# Patient Record
Sex: FEMALE | Race: WHITE | Employment: OTHER | URBAN - METROPOLITAN AREA
[De-identification: names, ages, dates, MRNs, and addresses within clinical notes are randomized per-mention and may not be internally consistent; named-entity substitution may affect disease eponyms.]

---

## 2017-01-26 RX ORDER — ATORVASTATIN CALCIUM 40 MG/1
TABLET, FILM COATED ORAL
Qty: 30 TAB | Refills: 6 | Status: SHIPPED | OUTPATIENT
Start: 2017-01-26 | End: 2017-03-13 | Stop reason: SDUPTHER

## 2017-03-13 ENCOUNTER — OFFICE VISIT (OUTPATIENT)
Dept: CARDIOLOGY CLINIC | Age: 62
End: 2017-03-13

## 2017-03-13 VITALS
DIASTOLIC BLOOD PRESSURE: 60 MMHG | BODY MASS INDEX: 18.88 KG/M2 | WEIGHT: 100 LBS | SYSTOLIC BLOOD PRESSURE: 115 MMHG | OXYGEN SATURATION: 98 % | HEIGHT: 61 IN

## 2017-03-13 DIAGNOSIS — I25.10 CORONARY ARTERY DISEASE INVOLVING NATIVE CORONARY ARTERY OF NATIVE HEART WITHOUT ANGINA PECTORIS: Primary | ICD-10-CM

## 2017-03-13 DIAGNOSIS — Z95.5 HISTORY OF CORONARY ARTERY STENT PLACEMENT: ICD-10-CM

## 2017-03-13 DIAGNOSIS — E78.5 HYPERLIPIDEMIA LDL GOAL <100: ICD-10-CM

## 2017-03-13 RX ORDER — ATORVASTATIN CALCIUM 40 MG/1
TABLET, FILM COATED ORAL
Qty: 90 TAB | Refills: 3 | Status: SHIPPED | OUTPATIENT
Start: 2017-03-13 | End: 2017-09-25 | Stop reason: SDUPTHER

## 2017-03-13 RX ORDER — PRASUGREL 10 MG/1
10 TABLET, FILM COATED ORAL DAILY
Qty: 30 TAB | Refills: 6 | Status: SHIPPED | OUTPATIENT
Start: 2017-03-13 | End: 2017-05-02 | Stop reason: ALTCHOICE

## 2017-03-13 NOTE — PROGRESS NOTES
HPI: I saw Irene SamuelBenny Castrejon in my office today in cardiovascular evaluation regarding her chronic coronary artery disease. Ms. Edison Castrejon is a very pleasant 59year old small  female, who began having some exertional chest tightness on 6/4/16, which progressed to more discomfort with less exertion and prompting a hospitalization on 6/6/16. She had only a very minimal increase in her biomarkers, but her history prompted a pharmacologic myocardial perfusion study, which demonstrated some inferior wall signs of ischemia and ultimately a cardiac catheterization by my associate Dr. Geri Wren and findings with the following anatomy:     1. Patent left main trunk. 2. 30% mid LAD obstruction. 3. Very small angiographically normal ramus intermediate range. 4. Diagonal branches were angiographically normal.  5. 20% smooth mid circumflex obstruction with patent obtuse marginal branches. 6. 99% stenosis at the junction of the mid and distal third of the vessel of TIMI2 flow.     The patient subsequently had stenting with 3.0 x 18 mm and 3.0 x 8 mm Xience stents with improvement of TIMI2 flow to TIMI3 flow and 0 residual.     She has done well on medical therapy since that time and comes in today relating that she is remaining quite active and denies any cardiovascular complaints whatsoever at this time. Encounter Diagnoses   Name Primary?  Coronary artery disease involving native coronary artery of native heart without angina pectoris Yes    History of coronary artery stent placement     Hyperlipidemia LDL goal <100        Discussion: This lady appears to be doing about as well as we could expect that I really have no recommendations for change at this time. She is not have any symptoms to suggest the development of recurrent symptomatic obstructive coronary disease and denies any heart failure issues.     Her latest lipid profile which was completed on November 22, 2016 demonstrated total cholesterol of 148, triglycerides 71, HDL of 68, LDL of 67.8, and VLDL of 14.2 which I think is good control on Lipitor 40 mg daily. Her blood pressure is well-controlled today and her EKG is stable and since she is otherwise doing well I am simply going to continue her present medications and see her again in several months or as needed if any new cardiovascular symptoms surface in the interim. PCP: Yong Phoenix MD      Past Medical History:   Diagnosis Date    Acute coronary syndrome ([facility] 16    stent, right coronary artery, 99% occlusion    Allergic rhinitis     Asthma     CAD (coronary artery disease)     Cancer ([facility] [address] [facility]     cervical    Carpal tunnel syndrome     Dyslipidemia     mild    Headache(784.0)     migraine    Hearing loss in right ear     sudden right hearing loss associated with vertigo    Impingement syndrome of shoulder, bursitis, left shoulder 2010       Past Surgical History:   Procedure Laterality Date    ENDOSCOPY, COLON, DIAGNOSTIC      HX  SECTION      X2    HX CORONARY STENT PLACEMENT  16    3.0 x 18 mm Xience stent and 3.0 x 8 mm Xience stent    HX HEART CATHETERIZATION  16    99% RCA    HX HYSTERECTOMY      for cervical cancer; pelvic exams Marjan Barraza    HX ORTHOPAEDIC      left knee surgery arthroscopy    HX ORTHOPAEDIC      carpal tunnel left     HX PELVIC LAPAROSCOPY      X2    HX TONSILLECTOMY         Current Outpatient Rx   Name  Route  Sig  Dispense  Refill    atorvastatin (LIPITOR) 40 mg tablet    Oral    Take 1 Tab by mouth nightly. 30 Tab    6      metoprolol tartrate (LOPRESSOR) 25 mg tablet    Oral    Take 0.5 Tabs by mouth every twelve (12) hours. 30 Tab    6      ticagrelor (BRILINTA) 90 mg tablet    Oral    Take 1 Tab by mouth two (2) times a day. 60 Tab    11      aspirin 81 mg chewable tablet    Oral    Take 1 Tab by mouth daily.     30 Tab    1      nitroglycerin (NITROSTAT) 0.4 mg SL tablet    SubLINGual 1 Tab by SubLINGual route as needed for Chest Pain. 1 Bottle    1      multivitamin (ONE A DAY) tablet    Oral    Take 1 Tab by mouth daily.  SUMAtriptan (IMITREX) 100 mg tablet    Oral    Take 1 Tab by mouth once as needed for Migraine for up to 1 dose. 9 Tab    2      CALCIUM CARBONATE (CALTRATE 600 PO)    Oral    Take 1 Tab by mouth two (2) times a day. No Known Allergies    Social History:   Social History   Substance Use Topics    Smoking status: Never Smoker    Smokeless tobacco: Never Used    Alcohol use Yes      Comment: occassionally         Family history: family history includes Cancer in her father and mother; Heart Disease in her father. Review of Systems:   Constitutional: Negative. Respiratory: Negative. Cardiovascular: Negative. Gastrointestinal: Negative. Musculoskeletal: Negative.          Physical Exam:   The patient is an alert, oriented, well developed, well nourished 59 y.o. female who was in no acute distress at the time of my examination. There were no vitals taken for this visit. BP Readings from Last 3 Encounters:   12/23/16 108/72   10/21/16 102/52   08/10/16 116/70      Wt Readings from Last 3 Encounters:   12/23/16 46.8 kg (103 lb 3.2 oz)   10/21/16 47.4 kg (104 lb 9.6 oz)   08/10/16 49 kg (108 lb)     HEENT: Conjuctiva white, mucosa moist, no pallor or cyanosis. NECK: Supple without masses, tenderness or thyromegaly. There was no jugular venous distention. Carotid are full bilaterally without bruits. CHEST: Symmetrical with good excursion. LUNGS: Clear to auscultation in all fields. HEART: Regular rate and rhythm. The apex is not displaced. There were no lifts, thrills or heaves. There is a normal S1 and S2 without appreciable murmurs, rubs, clicks, or gallops auscultated. ABDOMEN: Soft without masses, tenderness or organomegaly. EXTREMITIES: Full peripheral pulses without peripheral edema.   INTEGUMENT: Skin is warm and dry NEUROLOGICAL: The patient was oriented x3 with motor function grossly intact. Review of Data: See PMH and Cardiology and Imaging sections for cardiac testing  Lab Results   Component Value Date/Time    Cholesterol, total 148 11/22/2016 07:39 AM    HDL Cholesterol 66 11/22/2016 07:39 AM    LDL, calculated 67.8 11/22/2016 07:39 AM    Triglyceride 71 11/22/2016 07:39 AM    CHOL/HDL Ratio 2.2 11/22/2016 07:39 AM       Results for orders placed or performed in visit on 08/10/16   AMB POC EKG ROUTINE W/ 12 LEADS, INTER & REP     Status: None    Narrative    Sinus bradycardia rate 55. This tracing is within normal limits. Alex Dimas D.O., F.A.C.C. [facility]  [facility]  [address] [place] [address]. [address] [place] [address]  [phone]    PLEASE NOTE:  This document has been produced using voice recognition software. Unrecognized errors in transcription may be present.

## 2017-03-13 NOTE — MR AVS SNAPSHOT
Visit Information Date & Time Provider Department Dept. Phone Encounter #  
 3/13/2017  2:40 PM Tricia Polo, [facility] [address] [id number] Follow-up Instructions Return in about 6 months (around 9/13/2017), or if symptoms worsen or fail to improve. Your Appointments 10/16/2017  7:45 AM  
LAB with IOC NURSE VISIT Internist of [facility] ([address]) Appt Note: lab  
 [address], [address] [facility] [address] [place]  
  
   
 [address], [address]  
  
    
 10/23/2017  9:15 AM  
PHYSICAL with Smooth Anders MD  
Internist of [facility] [address] [place] [address]) Appt Note: rpe  
 [address] [facility], [address] [facility] [address] [place]  
  
   
 [address], [address] Upcoming Health Maintenance Date Due DTaP/Tdap/Td series (1 - Tdap) 5/6/2006 INFLUENZA AGE 9 TO ADULT 8/1/2016 COLONOSCOPY 11/6/2016 BREAST CANCER SCRN MAMMOGRAM 8/18/2017 PAP AKA CERVICAL CYTOLOGY 8/19/2018 Allergies as of 3/13/2017  Review Complete On: 3/13/2017 By: Tricia Polo, DO No Known Allergies Current Immunizations  Reviewed on 10/21/2016 Name Date Influenza Vaccine Whole 12/12/2003 TD Vaccine 5/5/2006 Zoster Vaccine, Live 10/16/2015  2:16 PM  
  
 Not reviewed this visit You Were Diagnosed With   
  
 Codes Comments Coronary artery disease involving native coronary artery of native heart without angina pectoris    -  Primary ICD-10-CM: I25.10 ICD-9-CM: 414.01 History of coronary artery stent placement     ICD-10-CM: Z95.5 ICD-9-CM: V45.82 Hyperlipidemia LDL goal <100     ICD-10-CM: E78.5 ICD-9-CM: 272.4 Vitals BP Height(growth percentile) Weight(growth percentile) SpO2 BMI OB Status  115/60 (BP 1 Location: Left arm, BP Patient Position: Sitting) 5' 1\" (1.549 m) 100 lb (45.4 kg) 98% 18.89 kg/m2 Hysterectomy Smoking Status Never Smoker BMI and BSA Data Body Mass Index Body Surface Area  
 18.89 kg/m 2 1.4 m 2 Preferred Pharmacy Pharmacy Name Phone [facility] [address], [address] [facility] [place] [phone] Your Updated Medication List  
  
   
This list is accurate as of: 3/13/17  3:49 PM.  Always use your most recent med list.  
  
  
  
  
 aspirin 81 mg chewable tablet Take 1 Tab by mouth daily. atorvastatin 40 mg tablet Commonly known as:  LIPITOR  
take 1 tablet by mouth NIGHTLY CALTRATE 600 PO Take 1 Tab by mouth two (2) times a day. chlorpheniramine-HYDROcodone 10-8 mg/5 mL suspension Commonly known as:  Alexy Medicine ER Take 5 mL by mouth every twelve (12) hours as needed for Cough. Max Daily Amount: 10 mL. metoprolol tartrate 25 mg tablet Commonly known as:  LOPRESSOR Take 0.5 Tabs by mouth every twelve (12) hours. multivitamin tablet Commonly known as:  ONE A DAY Take 1 Tab by mouth daily. nitroglycerin 0.4 mg SL tablet Commonly known as:  NITROSTAT  
1 Tab by SubLINGual route as needed for Chest Pain.  
  
 prasugrel 10 mg tablet Commonly known as:  EFFIENT Take 1 Tab by mouth daily. SUMAtriptan 100 mg tablet Commonly known as:  IMITREX Take 1 Tab by mouth once as needed for Migraine for up to 1 dose. Prescriptions Sent to Pharmacy Refills  
 atorvastatin (LIPITOR) 40 mg tablet 3 Sig: take 1 tablet by mouth NIGHTLY Class: Normal  
 [id number] [address] Ph #: [phone]  
 prasugrel (EFFIENT) 10 mg tablet 6 Sig: Take 1 Tab by mouth daily. Class: Normal  
 Pharmacy: [id number] [address] [facility], [address] [facility] [place] Ph #: [phone] Route: Oral  
  
We Performed the Following AMB POC EKG ROUTINE W/ 12 LEADS, INTER & REP [57024 CPT(R)] Follow-up Instructions Return in about 6 months (around 9/13/2017), or if symptoms worsen or fail to improve. Introducing [facility] & HEALTH SERVICES! Dear Jesus Hernandez: 
Thank you for requesting a [employer] account. Our records indicate that you already have an active [employer] account. You can access your account anytime at [email or web address]/[employer] Did you know that you can access your hospital and ER discharge instructions at any time in [employer]? You can also review all of your test results from your hospital stay or ER visit. Additional Information If you have questions, please visit the Frequently Asked Questions section of the [employer] website at [email or web address] Remember, [employer] is NOT to be used for urgent needs. For medical emergencies, dial 911. Now available from your iPhone and Android! Please provide this summary of care documentation to your next provider. Your primary care clinician is listed as Betzaida Zurita. Wil Reyes. If you have any questions after today's visit, please call [phone].

## 2017-05-01 NOTE — TELEPHONE ENCOUNTER
Pt called to state she is having increased headaches and dizziness for the last 2-3 weeks since starting the effient.      Verbal order and read back per Cherilyn Angelucci, DO  Switch to Plavix 75 mg daily     Pt aware

## 2017-05-02 RX ORDER — CLOPIDOGREL BISULFATE 75 MG/1
75 TABLET ORAL DAILY
Qty: 30 TAB | Refills: 6 | Status: SHIPPED | OUTPATIENT
Start: 2017-05-02 | End: 2017-09-25 | Stop reason: SDUPTHER

## 2017-07-24 RX ORDER — METOPROLOL TARTRATE 25 MG/1
12.5 TABLET, FILM COATED ORAL EVERY 12 HOURS
Qty: 30 TAB | Refills: 6 | Status: SHIPPED | OUTPATIENT
Start: 2017-07-24 | End: 2017-09-25 | Stop reason: SDUPTHER

## 2017-09-01 ENCOUNTER — OFFICE VISIT (OUTPATIENT)
Dept: INTERNAL MEDICINE CLINIC | Age: 62
End: 2017-09-01

## 2017-09-01 VITALS
TEMPERATURE: 97.9 F | BODY MASS INDEX: 18.61 KG/M2 | SYSTOLIC BLOOD PRESSURE: 124 MMHG | HEIGHT: 61 IN | HEART RATE: 49 BPM | DIASTOLIC BLOOD PRESSURE: 66 MMHG | RESPIRATION RATE: 12 BRPM | WEIGHT: 98.6 LBS | OXYGEN SATURATION: 97 %

## 2017-09-01 DIAGNOSIS — J20.9 BRONCHITIS WITH BRONCHOSPASM: Primary | ICD-10-CM

## 2017-09-01 RX ORDER — HYDROCODONE POLISTIREX AND CHLORPHENIRAMINE POLISTIREX 10; 8 MG/5ML; MG/5ML
1 SUSPENSION, EXTENDED RELEASE ORAL
Qty: 115 ML | Refills: 0 | Status: SHIPPED | OUTPATIENT
Start: 2017-09-01 | End: 2017-10-23 | Stop reason: ALTCHOICE

## 2017-09-01 RX ORDER — PREDNISONE 10 MG/1
TABLET ORAL
Qty: 47 TAB | Refills: 0 | Status: SHIPPED | OUTPATIENT
Start: 2017-09-01 | End: 2017-09-25 | Stop reason: ALTCHOICE

## 2017-09-01 NOTE — PROGRESS NOTES
Sebastien Beal 1955, is a 57 y.o. female, who is seen today for several day history of respiratory symptoms especially cough. She notes that 10 days ago she had a bad migraine that eventually cleared with sumatriptan and the same thing happened the next day in the next 2 days she spent a lot of time in bed. Around that third or fourth day she started with some nasal congestion which has since cleared after using DayQuil NyQuil and Robitussin-DM but she started with cough that has not cleared. She did sleep a little better last night with using some old Tussionex that she had on hand. She has not heard any wheezing does not have dyspnea and has had no fever with any of this illness. It is a minimally productive cough but she has not brought up any dark secretions and generally no secretions at all. No pleuritic pain. Past Medical History:   Diagnosis Date    Acute coronary syndrome ([facility] [address] 6/6/16    stent, right coronary artery, 99% occlusion    Allergic rhinitis     Asthma     CAD (coronary artery disease)     [facility]    cervical    Carpal tunnel syndrome     Dyslipidemia     mild    Headache     migraine    Hearing loss in right ear 2011    sudden right hearing loss associated with vertigo    Impingement syndrome of shoulder, bursitis, left shoulder 8/12/2010     Current Outpatient Prescriptions   Medication Sig Dispense Refill    metoprolol tartrate (LOPRESSOR) 25 mg tablet Take 0.5 Tabs by mouth every twelve (12) hours. 30 Tab 6    clopidogrel (PLAVIX) 75 mg tab Take 1 Tab by mouth daily. 30 Tab 6    atorvastatin (LIPITOR) 40 mg tablet take 1 tablet by mouth NIGHTLY 90 Tab 3    chlorpheniramine-HYDROcodone (TUSSIONEX PENNKINETIC ER) 10-8 mg/5 mL suspension Take 5 mL by mouth every twelve (12) hours as needed for Cough. Max Daily Amount: 10 mL. 115 mL 0    SUMAtriptan (IMITREX) 100 mg tablet Take 1 Tab by mouth once as needed for Migraine for up to 1 dose.  9 Tab 2    aspirin 81 mg chewable tablet Take 1 Tab by mouth daily. 30 Tab 1    nitroglycerin (NITROSTAT) 0.4 mg SL tablet 1 Tab by SubLINGual route as needed for Chest Pain. 1 Bottle 1    multivitamin (ONE A DAY) tablet Take 1 Tab by mouth daily.  CALCIUM CARBONATE (CALTRATE 600 PO) Take 1 Tab by mouth two (2) times a day. No Known Allergies    Visit Vitals    /66    Pulse (!) 49    Temp 97.9 °F (36.6 °C) (Oral)    Resp 12    Ht 5' 1\" (1.549 m)    Wt 98 lb 9.6 oz (44.7 kg)    SpO2 97%    BMI 18.63 kg/m2     Nasal passages are clear. Pharynx reveals no redness exudate or drainage. Neck reveals no adenopathy. Lungs are clear to percussion. Good breath sounds throughout but with forced expiration she does have wheezing. No crackles. No pleuritic rub. No chest wall tenderness. She is not using accessory muscles of respiration. She did cough with forced expiration. Assessment: Bronchitis with bronchospasm, she has had a similar process in the past but generally has not required prednisone. 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 Will also treat with Tussionex as needed at night to sleep. This visit lasted 25 minutes, greater than 50% of the time spent counseling on the pathophysiology of her problem and possible side effects of prednisone and then dissipated benefits and time course etc.    Follow-up as previously planned    Vitor Barba. Live Caruso MD FACP    Please note: This document has been produced using voice recognition software. Unrecognized errors in transcription may be present.

## 2017-09-25 ENCOUNTER — OFFICE VISIT (OUTPATIENT)
Dept: CARDIOLOGY CLINIC | Age: 62
End: 2017-09-25

## 2017-09-25 VITALS
HEART RATE: 64 BPM | BODY MASS INDEX: 18.63 KG/M2 | DIASTOLIC BLOOD PRESSURE: 70 MMHG | WEIGHT: 98.6 LBS | OXYGEN SATURATION: 99 % | SYSTOLIC BLOOD PRESSURE: 90 MMHG

## 2017-09-25 DIAGNOSIS — I25.10 CORONARY ARTERY DISEASE INVOLVING NATIVE CORONARY ARTERY OF NATIVE HEART WITHOUT ANGINA PECTORIS: Primary | ICD-10-CM

## 2017-09-25 DIAGNOSIS — E78.5 DYSLIPIDEMIA: ICD-10-CM

## 2017-09-25 RX ORDER — ATORVASTATIN CALCIUM 40 MG/1
TABLET, FILM COATED ORAL
Qty: 90 TAB | Refills: 3 | Status: SHIPPED | OUTPATIENT
Start: 2017-09-25 | End: 2018-10-28 | Stop reason: SDUPTHER

## 2017-09-25 RX ORDER — NITROGLYCERIN 0.4 MG/1
0.4 TABLET SUBLINGUAL AS NEEDED
Qty: 1 BOTTLE | Refills: 3 | Status: SHIPPED | OUTPATIENT
Start: 2017-09-25 | End: 2018-12-05 | Stop reason: SDUPTHER

## 2017-09-25 RX ORDER — METOPROLOL TARTRATE 25 MG/1
12.5 TABLET, FILM COATED ORAL EVERY 12 HOURS
Qty: 90 TAB | Refills: 3 | Status: SHIPPED | OUTPATIENT
Start: 2017-09-25 | End: 2018-05-02 | Stop reason: SDUPTHER

## 2017-09-25 RX ORDER — CLOPIDOGREL BISULFATE 75 MG/1
75 TABLET ORAL DAILY
Qty: 30 TAB | Refills: 6 | Status: SHIPPED | OUTPATIENT
Start: 2017-09-25 | End: 2018-04-29 | Stop reason: SDUPTHER

## 2017-09-25 NOTE — MR AVS SNAPSHOT
Visit Information Date & Time Provider Department Dept. Phone Encounter #  
 9/25/2017  2:20 PM Oskar Benton, [address] [facility] [address] [id number] Your Appointments 10/16/2017  7:45 AM  
LAB with [facility] NURSE VISIT Internist of [facility] ([facility]) Appt Note: lab  
 [address], [address] [place] [address] [address] [place]  
  
   
 [address] [facility]  
  
    
 10/23/2017  9:15 AM  
PHYSICAL with Haresh Gallegos MD  
Internist of [facility]) Appt Note: rpe  
 [address] [facility], [address] [place]  
  
   
 [address] Upcoming Health Maintenance Date Due DTaP/Tdap/Td series (1 - Tdap) 5/6/2006 COLONOSCOPY 11/6/2016 INFLUENZA AGE 9 TO ADULT 8/1/2017 BREAST CANCER SCRN MAMMOGRAM 8/18/2017 PAP AKA CERVICAL CYTOLOGY 8/19/2018 Allergies as of 9/25/2017  Review Complete On: 9/25/2017 By: Oskar Benton, DO No Known Allergies Current Immunizations  Reviewed on 10/21/2016 Name Date Influenza Vaccine Whole 12/12/2003 TD Vaccine 5/5/2006 Zoster Vaccine, Live 10/16/2015  2:16 PM  
  
 Not reviewed this visit You Were Diagnosed With   
  
 Codes Comments Coronary artery disease involving native coronary artery of native heart without angina pectoris    -  Primary ICD-10-CM: I25.10 ICD-9-CM: 414.01 Dyslipidemia     ICD-10-CM: E78.5 ICD-9-CM: 272.4 Vitals BP Pulse Weight(growth percentile) SpO2 BMI OB Status 90/70 64 98 lb 9.6 oz (44.7 kg) 99% 18.63 kg/m2 Hysterectomy Smoking Status Never Smoker BMI and BSA Data Body Mass Index Body Surface Area  
 18.63 kg/m 2 1.39 m 2 Preferred Pharmacy Pharmacy Name Phone [facility] [address] [facility], [address] [facility] [phone] Your Updated Medication List  
  
   
This list is accurate as of: 9/25/17  3:13 PM.  Always use your most recent med list.  
  
  
  
  
 aspirin 81 mg chewable tablet Take 1 Tab by mouth daily. atorvastatin 40 mg tablet Commonly known as:  LIPITOR  
take 1 tablet by mouth NIGHTLY CALTRATE 600 PO Take 1 Tab by mouth two (2) times a day. chlorpheniramine-HYDROcodone 10-8 mg/5 mL suspension Commonly known as:  Óscar Never ER Take 5 mL by mouth every twelve (12) hours as needed for Cough. Max Daily Amount: 10 mL. clopidogrel 75 mg Tab Commonly known as:  PLAVIX Take 1 Tab by mouth daily. metoprolol tartrate 25 mg tablet Commonly known as:  LOPRESSOR Take 0.5 Tabs by mouth every twelve (12) hours. multivitamin tablet Commonly known as:  ONE A DAY Take 1 Tab by mouth daily. nitroglycerin 0.4 mg SL tablet Commonly known as:  NITROSTAT  
1 Tab by SubLINGual route as needed for Chest Pain. SUMAtriptan 100 mg tablet Commonly known as:  IMITREX Take 1 Tab by mouth once as needed for Migraine for up to 1 dose. We Performed the Following AMB POC EKG ROUTINE W/ 12 LEADS, INTER & REP [58060 CPT(R)] Introducing [facility] & HEALTH SERVICES! Dear Lou Johnson: 
Thank you for requesting a [employer] account. Our records indicate that you already have an active [employer] account. You can access your account anytime at [email or web address] Did you know that you can access your hospital and ER discharge instructions at any time in [employer]? You can also review all of your test results from your hospital stay or ER visit. Additional Information If you have questions, please visit the Frequently Asked Questions section of the [employer] website at [email or web address] Remember, [employer] is NOT to be used for urgent needs. For medical emergencies, dial 911. Now available from your iPhone and Android! Please provide this summary of care documentation to your next provider. Your primary care clinician is listed as Juarez Barrett. Erendira Davies. If you have any questions after today's visit, please call [phone].

## 2017-09-25 NOTE — PROGRESS NOTES
1. Have you been to the ER, urgent care clinic since your last visit? Hospitalized since your last visit? no    2. Have you seen or consulted any other health care providers outside of the [address] [place] [address] since your last visit? Include any pap smears or colon screening.  no

## 2017-09-25 NOTE — PROGRESS NOTES
HPI:    I saw Alisson KowalskiBenny Jones in my office today in   cardiovascular evaluation regarding her chronic coronary artery disease. Ms. Dorian Jones is a very pleasant 59year old small  female, who began having some exertional chest tightness on 6/4/16, which progressed to more discomfort with less exertion and prompting a hospitalization on 6/6/16. She had only a very minimal increase in her biomarkers, but her history prompted a pharmacologic myocardial perfusion study, which demonstrated some inferior wall signs of ischemia and ultimately a cardiac catheterization by my associate Dr. Alysa Long and findings with the following anatomy:      1. Patent left main trunk. 2. 30% mid LAD obstruction. 3. Very small angiographically normal ramus intermediate range. 4. Diagonal branches were angiographically normal.  5. 20% smooth mid circumflex obstruction with patent obtuse marginal branches. 6. 99% stenosis at the junction of the mid and distal third of the vessel of TIMI2 flow.      The patient subsequently had stenting with 3.0 x 18 mm and 3.0 x 8 mm Xience stents with improvement of TIMI2 flow to TIMI3 flow and 0 residual.     She comes into the office today and relates that she is doing fine and medical therapy without any cardiovascular symptomatology at all at this time. Encounter Diagnoses   Name Primary?  Coronary artery disease involving native coronary artery of native heart without angina pectoris Yes    Dyslipidemia        Discussion: This lady has really been doing quite well from a cardiovascular vantage. She has been following primarily a vegetarian diet over the past year or so and has lost a good deal of weight. She tells me that she feels great and is not having any symptoms to suggest the development of recurrent symptomatic obstructive coronary disease.     Her latest lipid profile that I have a copy of was done in November 2016 at that time her cholesterol was quite good with total cholesterol of 148, triglycerides of 71, HDL of 66, LDL of 67.8, and VLDL of 14.2 which I think is good control on her Lipitor 40 mg daily and she is going to be getting her lipid profile repeated again in the next 3 weeks. Hopefully her cholesterol is in the same level or better which I suspect it will be in view of her diet modification over the past year or more. Her blood pressure is on the lower side of normal at 90/70, but the patient relates she is always had a somewhat low blood pressure. She is on a very low-dose of metoprolol and I would consider discontinuing that if she became symptomatic in any way but she tells me that she feels fine without any cardiovascular symptomatology at this time. PCP: Edy Anthony MD      Past Medical History:   Diagnosis Date    Acute coronary syndrome ([facility] [address] 16    stent, right coronary artery, 99% occlusion    Allergic rhinitis     Asthma     CAD (coronary artery disease)     Cancer ([facility] [address]     cervical    Carpal tunnel syndrome     Dyslipidemia     mild    Headache     migraine    Hearing loss in right ear     sudden right hearing loss associated with vertigo    Impingement syndrome of shoulder, bursitis, left shoulder 2010       Past Surgical History:   Procedure Laterality Date    ENDOSCOPY, COLON, DIAGNOSTIC      HX  SECTION      X2    HX CORONARY STENT PLACEMENT  16    3.0 x 18 mm Xience stent and 3.0 x 8 mm Xience stent    HX HEART CATHETERIZATION  16    99% RCA    HX HYSTERECTOMY      for cervical cancer; pelvic exams Marjan Barraza    HX ORTHOPAEDIC      left knee surgery arthroscopy    HX ORTHOPAEDIC      carpal tunnel left     HX PELVIC LAPAROSCOPY      X2    HX TONSILLECTOMY         Current Outpatient Rx   Name  Route  Sig  Dispense  Refill    atorvastatin (LIPITOR) 40 mg tablet    Oral    Take 1 Tab by mouth nightly.     30 Tab    6      metoprolol tartrate (LOPRESSOR) 25 mg tablet    Oral Take 0.5 Tabs by mouth every twelve (12) hours. 30 Tab    6      ticagrelor (BRILINTA) 90 mg tablet    Oral    Take 1 Tab by mouth two (2) times a day. 60 Tab    11      aspirin 81 mg chewable tablet    Oral    Take 1 Tab by mouth daily. 30 Tab    1      nitroglycerin (NITROSTAT) 0.4 mg SL tablet    SubLINGual    1 Tab by SubLINGual route as needed for Chest Pain. 1 Bottle    1      multivitamin (ONE A DAY) tablet    Oral    Take 1 Tab by mouth daily.  SUMAtriptan (IMITREX) 100 mg tablet    Oral    Take 1 Tab by mouth once as needed for Migraine for up to 1 dose. 9 Tab    2      CALCIUM CARBONATE (CALTRATE 600 PO)    Oral    Take 1 Tab by mouth two (2) times a day. No Known Allergies    Social History:   Social History   Substance Use Topics    Smoking status: Never Smoker    Smokeless tobacco: Never Used    Alcohol use Yes      Comment: occassionally         Family history: family history includes Cancer in her father, mother, and another family member; Heart Disease in her father. Review of Systems:   Constitutional: Negative. Respiratory: Positive for cough. Negative for hemoptysis, sputum production, shortness of breath and wheezing. Cardiovascular: Negative. Gastrointestinal: Negative. Musculoskeletal: Negative.             Physical Exam:   The patient is an alert, oriented, well developed, well nourished 57 y.o.  female who was in no acute distress at the time of my examination. Visit Vitals    BP 90/70    Pulse 64    Wt 44.7 kg (98 lb 9.6 oz)    SpO2 99%    BMI 18.63 kg/m2      BP Readings from Last 3 Encounters:   09/25/17 90/70   09/01/17 124/66   03/13/17 115/60      Wt Readings from Last 3 Encounters:   09/25/17 44.7 kg (98 lb 9.6 oz)   09/01/17 44.7 kg (98 lb 9.6 oz)   03/13/17 45.4 kg (100 lb)     HEENT: Conjuctiva white, mucosa moist, no pallor or cyanosis. NECK: Supple without masses, tenderness or thyromegaly.  There was no jugular venous distention. Carotid are full bilaterally without bruits. CHEST: Symmetrical with good excursion. LUNGS: Clear to auscultation in all fields. HEART: Regular rate and rhythm. The apex is not displaced. There were no lifts, thrills or heaves. There is a normal S1 and S2 without appreciable murmurs, rubs, clicks, or gallops auscultated. ABDOMEN: Soft without masses, tenderness or organomegaly. EXTREMITIES: Full peripheral pulses without peripheral edema. INTEGUMENT: Skin is warm and dry   NEUROLOGICAL: The patient was oriented x3 with motor function grossly intact. Review of Data: See PMH and Cardiology and Imaging sections for cardiac testing  Lab Results   Component Value Date/Time    Cholesterol, total 148 11/22/2016 07:39 AM    HDL Cholesterol 66 11/22/2016 07:39 AM    LDL, calculated 67.8 11/22/2016 07:39 AM    Triglyceride 71 11/22/2016 07:39 AM    CHOL/HDL Ratio 2.2 11/22/2016 07:39 AM       Results for orders placed or performed in visit on 03/13/17   AMB POC EKG ROUTINE W/ 12 LEADS, INTER & REP     Status: None    Narrative    Sinus bradycardia, rate 53. This EKG is within normal limits and similar to the EKG of August 10, 2016. Sherwin Hammonds D.O., F.A.C.C. [facility]  [facility]  [address] [place] [address]. [address] [place] CARO  [phone]    PLEASE NOTE:  This document has been produced using voice recognition software. Unrecognized errors in transcription may be present.

## 2017-09-25 NOTE — PROGRESS NOTES
Review of Systems   Constitutional: Negative. Respiratory: Positive for cough. Negative for hemoptysis, sputum production, shortness of breath and wheezing. Cardiovascular: Negative. Gastrointestinal: Negative. Musculoskeletal: Negative.

## 2017-10-09 ENCOUNTER — TELEPHONE (OUTPATIENT)
Dept: INTERNAL MEDICINE CLINIC | Age: 62
End: 2017-10-09

## 2017-10-09 DIAGNOSIS — Z00.00 ROUTINE GENERAL MEDICAL EXAMINATION AT A HEALTH CARE FACILITY: Primary | ICD-10-CM

## 2017-10-09 DIAGNOSIS — E78.5 DYSLIPIDEMIA: ICD-10-CM

## 2017-10-09 DIAGNOSIS — E78.5 HYPERLIPIDEMIA LDL GOAL <100: ICD-10-CM

## 2017-10-16 ENCOUNTER — HOSPITAL ENCOUNTER (OUTPATIENT)
Dept: LAB | Age: 62
Discharge: HOME | End: 2017-10-16
Payer: COMMERCIAL

## 2017-10-16 DIAGNOSIS — E78.5 DYSLIPIDEMIA: ICD-10-CM

## 2017-10-16 DIAGNOSIS — E78.5 HYPERLIPIDEMIA LDL GOAL <100: ICD-10-CM

## 2017-10-16 DIAGNOSIS — Z00.00 ROUTINE GENERAL MEDICAL EXAMINATION AT A HEALTH CARE FACILITY: ICD-10-CM

## 2017-10-16 LAB
ALBUMIN SERPL-MCNC: 3.9 G/DL (ref 3.4–5)
ALBUMIN/GLOB SERPL: 1.4 {RATIO} (ref 0.8–1.7)
ALP SERPL-CCNC: 45 U/L (ref 45–117)
ALT SERPL-CCNC: 32 U/L (ref 13–56)
ANION GAP SERPL CALC-SCNC: 4 MMOL/L (ref 3–18)
AST SERPL-CCNC: 19 U/L (ref 15–37)
BASOPHILS # BLD: 0.1 K/UL (ref 0–0.06)
BASOPHILS NFR BLD: 1 % (ref 0–2)
BILIRUB SERPL-MCNC: 0.2 MG/DL (ref 0.2–1)
BUN SERPL-MCNC: 18 MG/DL (ref 7–18)
BUN/CREAT SERPL: 22 (ref 12–20)
CALCIUM SERPL-MCNC: 9 MG/DL (ref 8.5–10.1)
CHLORIDE SERPL-SCNC: 101 MMOL/L (ref 100–108)
CHOLEST SERPL-MCNC: 163 MG/DL
CO2 SERPL-SCNC: 30 MMOL/L (ref 21–32)
CREAT SERPL-MCNC: 0.82 MG/DL (ref 0.6–1.3)
DIFFERENTIAL METHOD BLD: ABNORMAL
EOSINOPHIL # BLD: 0.2 K/UL (ref 0–0.4)
EOSINOPHIL NFR BLD: 4 % (ref 0–5)
ERYTHROCYTE [DISTWIDTH] IN BLOOD BY AUTOMATED COUNT: 14.2 % (ref 11.6–14.5)
GLOBULIN SER CALC-MCNC: 2.7 G/DL (ref 2–4)
GLUCOSE SERPL-MCNC: 91 MG/DL (ref 74–99)
HCT VFR BLD AUTO: 45.1 % (ref 35–45)
HDLC SERPL-MCNC: 73 MG/DL (ref 40–60)
HDLC SERPL: 2.2 {RATIO} (ref 0–5)
HGB BLD-MCNC: 14.5 G/DL (ref 12–16)
LDLC SERPL CALC-MCNC: 71.6 MG/DL (ref 0–100)
LIPID PROFILE,FLP: ABNORMAL
LYMPHOCYTES # BLD: 1.7 K/UL (ref 0.9–3.6)
LYMPHOCYTES NFR BLD: 31 % (ref 21–52)
MCH RBC QN AUTO: 28.7 PG (ref 24–34)
MCHC RBC AUTO-ENTMCNC: 32.2 G/DL (ref 31–37)
MCV RBC AUTO: 89.3 FL (ref 74–97)
MONOCYTES # BLD: 0.8 K/UL (ref 0.05–1.2)
MONOCYTES NFR BLD: 14 % (ref 3–10)
NEUTS SEG # BLD: 2.8 K/UL (ref 1.8–8)
NEUTS SEG NFR BLD: 50 % (ref 40–73)
PLATELET # BLD AUTO: 266 K/UL (ref 135–420)
PMV BLD AUTO: 11.6 FL (ref 9.2–11.8)
POTASSIUM SERPL-SCNC: 5.1 MMOL/L (ref 3.5–5.5)
PROT SERPL-MCNC: 6.6 G/DL (ref 6.4–8.2)
RBC # BLD AUTO: 5.05 M/UL (ref 4.2–5.3)
SODIUM SERPL-SCNC: 135 MMOL/L (ref 136–145)
TRIGL SERPL-MCNC: 92 MG/DL (ref ?–150)
TSH SERPL DL<=0.05 MIU/L-ACNC: 1.62 UIU/ML (ref 0.36–3.74)
VLDLC SERPL CALC-MCNC: 18.4 MG/DL
WBC # BLD AUTO: 5.6 K/UL (ref 4.6–13.2)

## 2017-10-16 PROCEDURE — 85025 COMPLETE CBC W/AUTO DIFF WBC: CPT | Performed by: INTERNAL MEDICINE

## 2017-10-16 PROCEDURE — 80061 LIPID PANEL: CPT | Performed by: INTERNAL MEDICINE

## 2017-10-16 PROCEDURE — 80053 COMPREHEN METABOLIC PANEL: CPT | Performed by: INTERNAL MEDICINE

## 2017-10-16 PROCEDURE — 84443 ASSAY THYROID STIM HORMONE: CPT | Performed by: INTERNAL MEDICINE

## 2017-10-16 PROCEDURE — 36415 COLL VENOUS BLD VENIPUNCTURE: CPT | Performed by: INTERNAL MEDICINE

## 2017-10-23 ENCOUNTER — OFFICE VISIT (OUTPATIENT)
Dept: INTERNAL MEDICINE CLINIC | Age: 62
End: 2017-10-23

## 2017-10-23 VITALS
BODY MASS INDEX: 18.54 KG/M2 | OXYGEN SATURATION: 97 % | HEIGHT: 61 IN | WEIGHT: 98.2 LBS | SYSTOLIC BLOOD PRESSURE: 96 MMHG | RESPIRATION RATE: 12 BRPM | HEART RATE: 60 BPM | DIASTOLIC BLOOD PRESSURE: 60 MMHG | TEMPERATURE: 98.3 F

## 2017-10-23 DIAGNOSIS — I25.10 CORONARY ARTERY DISEASE INVOLVING NATIVE CORONARY ARTERY OF NATIVE HEART WITHOUT ANGINA PECTORIS: ICD-10-CM

## 2017-10-23 DIAGNOSIS — E78.5 HYPERLIPIDEMIA LDL GOAL <100: ICD-10-CM

## 2017-10-23 DIAGNOSIS — Z12.11 ENCOUNTER FOR SCREENING COLONOSCOPY: ICD-10-CM

## 2017-10-23 DIAGNOSIS — Z00.00 ROUTINE GENERAL MEDICAL EXAMINATION AT A HEALTH CARE FACILITY: Primary | ICD-10-CM

## 2017-10-23 NOTE — MR AVS SNAPSHOT
Visit Information Date & Time Provider Department Dept. Phone Encounter #  
 10/23/2017  9:15 AM Silvina Salvador MD [facility] [phone] Your Appointments 4/17/2018  2:00 PM  
Follow Up with Wesly Ring DO [facility] ([address]) Appt Note: 6 month follow up [place]  [place] [id number]  
[phone] [address] [facility] [address] [address]  
  
    
 10/26/2018  9:15 AM  
PHYSICAL with Silvina Salvador MD  
[facility] [address]) Appt Note: rpe  
 [address] [facility]  [place] [address] [place]  
  
   
 [address], [address] Upcoming Health Maintenance Date Due DTaP/Tdap/Td series (1 - Tdap) 5/6/2006 COLONOSCOPY 11/6/2016 BREAST CANCER SCRN MAMMOGRAM 8/30/2019 PAP AKA CERVICAL CYTOLOGY 10/23/2020 Allergies as of 10/23/2017  Review Complete On: 10/23/2017 By: Silvina Salvador MD  
 No Known Allergies Current Immunizations  Reviewed on 10/23/2017 Name Date Influenza Vaccine Whole 12/12/2003 TD Vaccine 5/5/2006 Zoster Vaccine, Live 10/16/2015  2:16 PM  
  
 Reviewed by Silvina Salvador MD on 10/23/2017 at  9:34 AM  
You Were Diagnosed With   
  
 Codes Comments Routine general medical examination at a health care facility    -  Primary ICD-10-CM: Z00.00 ICD-9-CM: V70.0 Hyperlipidemia LDL goal <100     ICD-10-CM: E78.5 ICD-9-CM: 272.4 Coronary artery disease involving native coronary artery of native heart without angina pectoris     ICD-10-CM: I25.10 ICD-9-CM: 414.01 Encounter for screening colonoscopy     ICD-10-CM: Z12.11 ICD-9-CM: V76.51 Vitals BP Pulse Temp Resp Height(growth percentile) Weight(growth percentile) 96/60 60 98.3 °F (36.8 °C) (Oral) 12 5' 1\" (1.549 m) 98 lb 3.2 oz (44.5 kg) SpO2 BMI OB Status Smoking Status 97% 18.55 kg/m2 Hysterectomy Never Smoker Vitals History BMI and BSA Data Body Mass Index Body Surface Area 18.55 kg/m 2 1.38 m 2 Preferred Pharmacy Pharmacy Name Phone [facility] [address], [address] [facility] [phone] Your Updated Medication List  
  
   
This list is accurate as of: 10/23/17 10:03 AM.  Always use your most recent med list.  
  
  
  
  
 aspirin 81 mg chewable tablet Take 1 Tab by mouth daily. atorvastatin 40 mg tablet Commonly known as:  LIPITOR  
take 1 tablet by mouth NIGHTLY CALTRATE 600 PO Take 1 Tab by mouth two (2) times a day. clopidogrel 75 mg Tab Commonly known as:  PLAVIX Take 1 Tab by mouth daily. metoprolol tartrate 25 mg tablet Commonly known as:  LOPRESSOR Take 0.5 Tabs by mouth every twelve (12) hours. multivitamin tablet Commonly known as:  ONE A DAY Take 1 Tab by mouth daily. nitroglycerin 0.4 mg SL tablet Commonly known as:  NITROSTAT  
1 Tab by SubLINGual route as needed for Chest Pain. SUMAtriptan 100 mg tablet Commonly known as:  IMITREX Take 1 Tab by mouth once as needed for Migraine for up to 1 dose. We Performed the Following REFERRAL TO GASTROENTEROLOGY [TTU83 Custom] Comments:  
 Needs screening colonoscopy, she may be off Plavix and aspirin for the procedure Referral Information Referral ID Referred By Referred To  
  
 [id number] Alyssa Lao [facility] [address] [place], [address] Phone: [phone] Fax: [phone] Visits Status Start Date End Date 1 New Request 10/23/17 10/23/18 If your referral has a status of pending review or denied, additional information will be sent to support the outcome of this decision. Introducing [facility]! Dear Kristen Arriaga: Thank you for requesting a [employer] account. Our records indicate that you already have an active Vidavee account. You can access your account anytime at [email or web address] Did you know that you can access your hospital and ER discharge instructions at any time in V[employer]avee? You can also review all of your test results from your hospital stay or ER visit. Additional Information If you have questions, please visit the Frequently Asked Questions section of the V[employer]avee website at [email or web address] Remember, Vidavee is NOT to be used for urgent needs. For medical emergencies, dial 911. Now available from your iPhone and Android! Please provide this summary of care documentation to your next provider. Your primary care clinician is listed as Tatiana Da Silva. If you have any questions after today's visit, please call [phone].

## 2017-10-23 NOTE — PROGRESS NOTES
Nirmala Butterfield 1955, is a 57 y.o. female, who is seen today for routine physical exam and follow-up on atherosclerotic heart disease dyslipidemia and migraine headaches. She is not getting migraine headaches anymore. She had developed unstable angina over year ago and had stenting has had no chest symptoms since then. She drastically change her diet at that time and does not eat fast food, lost weight even though she was not particularly overweight to start with has lost another 6 pounds in the last year. She now weighs what she did just before her first pregnancy 32 years ago. Past Medical History:   Diagnosis Date    Acute coronary syndrome ([facility] [address] 16    stent, right coronary artery, 99% occlusion    Allergic rhinitis     Asthma     CAD (coronary artery disease)     Cancer ([facility] [address]     cervical    Carpal tunnel syndrome     Dyslipidemia     mild    Headache(784.0)     migraine    Hearing loss in right ear     sudden right hearing loss associated with vertigo    Impingement syndrome of shoulder, bursitis, left shoulder 2010     Past Surgical History:   Procedure Laterality Date    ENDOSCOPY, COLON, DIAGNOSTIC      HX  SECTION      X2    HX CORONARY STENT PLACEMENT  16    3.0 x 18 mm Xience stent and 3.0 x 8 mm Xience stent    HX HEART CATHETERIZATION  16    99% RCA    HX HYSTERECTOMY      for cervical cancer; pelvic exams Boni Valenzuela    HX ORTHOPAEDIC      left knee surgery arthroscopy    HX ORTHOPAEDIC      carpal tunnel left     HX PELVIC LAPAROSCOPY      X2    HX TONSILLECTOMY       Current Outpatient Prescriptions   Medication Sig Dispense Refill    nitroglycerin (NITROSTAT) 0.4 mg SL tablet 1 Tab by SubLINGual route as needed for Chest Pain. 1 Bottle 3    clopidogrel (PLAVIX) 75 mg tab Take 1 Tab by mouth daily. 30 Tab 6    metoprolol tartrate (LOPRESSOR) 25 mg tablet Take 0.5 Tabs by mouth every twelve (12) hours.  90 Tab 3    atorvastatin (LIPITOR) 40 mg tablet take 1 tablet by mouth NIGHTLY 90 Tab 3    SUMAtriptan (IMITREX) 100 mg tablet Take 1 Tab by mouth once as needed for Migraine for up to 1 dose. 9 Tab 2    aspirin 81 mg chewable tablet Take 1 Tab by mouth daily. 30 Tab 1    multivitamin (ONE A DAY) tablet Take 1 Tab by mouth daily.  CALCIUM CARBONATE (CALTRATE 600 PO) Take 1 Tab by mouth two (2) times a day. No Known Allergies  Social History     Social History    Marital status:      Spouse name: N/A    Number of children: N/A    Years of education: N/A     Social History Main Topics    Smoking status: Never Smoker    Smokeless tobacco: Never Used    Alcohol use Yes      Comment: occassionally    Drug use: No    Sexual activity: Not Asked     Other Topics Concern    None     Social History Narrative     Visit Vitals    BP 96/60    Pulse 60    Temp 98.3 °F (36.8 °C) (Oral)    Resp 12    Ht 5' 1\" (1.549 m)    Wt 98 lb 3.2 oz (44.5 kg)    SpO2 97%    BMI 18.55 kg/m2     The patient is a well-developed well-nourished female in no apparent distress. HEENT: Pupils are equal and react to light and extraocular movements are full. Ear canals and tympanic membranes appear normal. Oral cavity appears normal with no oral lesions. Neck: Carotids are 2+ without bruits. No adenopathy or thyromegaly. Lungs are clear to percussion. I hear no wheezing, rales or rhonchi. Heart reveals a regular rhythm with no murmur, gallop, click or rub. The apical impulse is in the fifth interspace at the midclavicular line. Abdomen is soft and nontender with no hepatosplenomegaly or masses. Bowel sounds are normoactive and there is no distention or tympany. Extremities reveal no clubbing cyanosis or edema. Pulses are 2+. Skin reveals no suspicious skin growths. Breasts reveal no masses, skin or nipple abnormalities. No axillary adenopathy.     Results for orders placed or performed during the hospital encounter of 10/16/17 CBC WITH AUTOMATED DIFF   Result Value Ref Range    WBC 5.6 4.6 - 13.2 K/uL    RBC 5.05 4.20 - 5.30 M/uL    HGB 14.5 12.0 - 16.0 g/dL    HCT 45.1 (H) 35.0 - 45.0 %    MCV 89.3 74.0 - 97.0 FL    MCH 28.7 24.0 - 34.0 PG    MCHC 32.2 31.0 - 37.0 g/dL    RDW 14.2 11.6 - 14.5 %    PLATELET 960 202 - 389 K/uL    MPV 11.6 9.2 - 11.8 FL    NEUTROPHILS 50 40 - 73 %    LYMPHOCYTES 31 21 - 52 %    MONOCYTES 14 (H) 3 - 10 %    EOSINOPHILS 4 0 - 5 %    BASOPHILS 1 0 - 2 %    ABS. NEUTROPHILS 2.8 1.8 - 8.0 K/UL    ABS. LYMPHOCYTES 1.7 0.9 - 3.6 K/UL    ABS. MONOCYTES 0.8 0.05 - 1.2 K/UL    ABS. EOSINOPHILS 0.2 0.0 - 0.4 K/UL    ABS. BASOPHILS 0.1 (H) 0.0 - 0.06 K/UL    DF AUTOMATED     LIPID PANEL   Result Value Ref Range    LIPID PROFILE          Cholesterol, total 163 <200 MG/DL    Triglyceride 92 <150 MG/DL    HDL Cholesterol 73 (H) 40 - 60 MG/DL    LDL, calculated 71.6 0 - 100 MG/DL    VLDL, calculated 18.4 MG/DL    CHOL/HDL Ratio 2.2 0 - 5.0     METABOLIC PANEL, COMPREHENSIVE   Result Value Ref Range    Sodium 135 (L) 136 - 145 mmol/L    Potassium 5.1 3.5 - 5.5 mmol/L    Chloride 101 100 - 108 mmol/L    CO2 30 21 - 32 mmol/L    Anion gap 4 3.0 - 18 mmol/L    Glucose 91 74 - 99 mg/dL    BUN 18 7.0 - 18 MG/DL    Creatinine 0.82 0.6 - 1.3 MG/DL    BUN/Creatinine ratio 22 (H) 12 - 20      GFR est AA >60 >60 ml/min/1.73m2    GFR est non-AA >60 >60 ml/min/1.73m2    Calcium 9.0 8.5 - 10.1 MG/DL    Bilirubin, total 0.2 0.2 - 1.0 MG/DL    ALT (SGPT) 32 13 - 56 U/L    AST (SGOT) 19 15 - 37 U/L    Alk. phosphatase 45 45 - 117 U/L    Protein, total 6.6 6.4 - 8.2 g/dL    Albumin 3.9 3.4 - 5.0 g/dL    Globulin 2.7 2.0 - 4.0 g/dL    A-G Ratio 1.4 0.8 - 1.7     TSH 3RD GENERATION   Result Value Ref Range    TSH 1.62 0.36 - 3.74 uIU/mL     Assessment: #1. ASHD asymptomatic since stent in June of last year. She will continue aspirin 81 mg daily and metoprolol 25 mg twice daily and Plavix until her cardiologist tells her to stop.   #2. Hyperlipidemia doing very well. She will continue atorvastatin 40 mg each evening. #3. History of migraine headaches doing well. Much less frequent headaches than she had in the past.  She will use sumatriptan 100 mg if needed. She declines flu shot as always. It is therefore not indicated for her. She is due for colonoscopy, that was postponed because of being on Plavix and aspirin but since it has been more than a year since her stent she can have colonoscopy and could come off aspirin and Plavix for 7-14 days before the procedure. She agrees to do this and I have put in a referral for her. She has a very good friend who will drive her to and from colonoscopy. Follow-up in 1 year for a physical or sooner if needed    Amrik Bell MD FACP    Please note: This document has been produced using voice recognition software. Unrecognized errors in transcription may be present.

## 2018-03-01 ENCOUNTER — OFFICE VISIT (OUTPATIENT)
Dept: INTERNAL MEDICINE CLINIC | Age: 63
End: 2018-03-01

## 2018-03-01 VITALS
OXYGEN SATURATION: 100 % | DIASTOLIC BLOOD PRESSURE: 58 MMHG | WEIGHT: 97.6 LBS | SYSTOLIC BLOOD PRESSURE: 102 MMHG | HEIGHT: 61 IN | RESPIRATION RATE: 14 BRPM | BODY MASS INDEX: 18.43 KG/M2 | TEMPERATURE: 98.1 F | HEART RATE: 52 BPM

## 2018-03-01 DIAGNOSIS — R35.0 URINARY FREQUENCY: Primary | ICD-10-CM

## 2018-03-01 DIAGNOSIS — R39.89 SENSATION OF PRESSURE IN BLADDER AREA: ICD-10-CM

## 2018-03-01 DIAGNOSIS — R30.0 DYSURIA: ICD-10-CM

## 2018-03-01 LAB
BILIRUB UR QL STRIP: NEGATIVE
GLUCOSE UR-MCNC: NEGATIVE MG/DL
KETONES P FAST UR STRIP-MCNC: NEGATIVE MG/DL
PH UR STRIP: 6 [PH] (ref 4.6–8)
PROT UR QL STRIP: NEGATIVE
SP GR UR STRIP: 1.01 (ref 1–1.03)
UA UROBILINOGEN AMB POC: NORMAL (ref 0.2–1)
URINALYSIS CLARITY POC: CLEAR
URINALYSIS COLOR POC: YELLOW
URINE BLOOD POC: NORMAL
URINE LEUKOCYTES POC: NEGATIVE
URINE NITRITES POC: NEGATIVE

## 2018-03-01 NOTE — PROGRESS NOTES
1. Have you been to the ER, urgent care clinic or hospitalized since your last visit? NO.     2. Have you seen or consulted any other health care providers outside of the [address] [place] [address] since your last visit (Include any pap smears or colon screening)? NO      Do you have an Advanced Directive? NO    Would you like information on Advanced Directives?  YES

## 2018-03-01 NOTE — PROGRESS NOTES
HPI/History  Carla Sanches is a 57 y.o.  female who presents for evaluation. Pt with urinary symptoms starting . Positive for bladder pressure, mild discomfort with urination but not overly described as burning. Has seen a minimal amount of blood in underwear on occasion. No trang hematuria, CVAt, or fevers. Denies vaginal sxs. Mostly drinks water and has drank cranberry juice during this time. No other sxs or complaints.     Patient Active Problem List   Diagnosis Code    Impingement syndrome of shoulder, bursitis, left shoulder M75.40    Allergic rhinitis 80    Dyslipidemia E78.5    Hearing loss in right ear H91.91    Migraine headache G43.909    ACS (acute coronary syndrome) (HCC) I24.9    Hyperlipidemia LDL goal <100 E78.5    History of coronary artery stent placement Z95.5    CAD (coronary artery disease) I25.10     Past Medical History:   Diagnosis Date    Acute coronary syndrome ([address] 16    stent, right coronary artery, 99% occlusion    Allergic rhinitis     Asthma     CAD (coronary artery disease)     Cancer [address] [address]     cervical    Carpal tunnel syndrome     Dyslipidemia     mild    Headache(784.0)     migraine    Hearing loss in right ear     sudden right hearing loss associated with vertigo    Impingement syndrome of shoulder, bursitis, left shoulder 2010     Past Surgical History:   Procedure Laterality Date    ENDOSCOPY, COLON, DIAGNOSTIC      HX  SECTION      X2    HX CORONARY STENT PLACEMENT  16    3.0 x 18 mm Xience stent and 3.0 x 8 mm Xience stent    HX HEART CATHETERIZATION  16    99% RCA    HX HYSTERECTOMY      for cervical cancer; pelvic exams Marjan Barraza    HX ORTHOPAEDIC      left knee surgery arthroscopy    HX ORTHOPAEDIC      carpal tunnel left     HX PELVIC LAPAROSCOPY      X2    HX TONSILLECTOMY       Social History     Social History    Marital status:      Spouse name: N/A    Number of children: N/A    Years of education: N/A     Occupational History    Not on file. Social History Main Topics    Smoking status: Never Smoker    Smokeless tobacco: Never Used    Alcohol use Yes      Comment: occassionally    Drug use: No    Sexual activity: Not on file     Other Topics Concern    Not on file     Social History Narrative     Family History   Problem Relation Age of Onset    Cancer Mother     Heart Disease Father     Cancer Father      lung ca    Cancer Other      Current Outpatient Prescriptions   Medication Sig    nitroglycerin (NITROSTAT) 0.4 mg SL tablet 1 Tab by SubLINGual route as needed for Chest Pain.  clopidogrel (PLAVIX) 75 mg tab Take 1 Tab by mouth daily.  metoprolol tartrate (LOPRESSOR) 25 mg tablet Take 0.5 Tabs by mouth every twelve (12) hours.  atorvastatin (LIPITOR) 40 mg tablet take 1 tablet by mouth NIGHTLY    SUMAtriptan (IMITREX) 100 mg tablet Take 1 Tab by mouth once as needed for Migraine for up to 1 dose.  aspirin 81 mg chewable tablet Take 1 Tab by mouth daily.  multivitamin (ONE A DAY) tablet Take 1 Tab by mouth daily.  CALCIUM CARBONATE (CALTRATE 600 PO) Take 1 Tab by mouth two (2) times a day. No current facility-administered medications for this visit. No Known Allergies    Review of Systems  Aside from those included in HPI, remainder of ROS negative. Physical Examination  Visit Vitals    /58 (BP 1 Location: Right arm, BP Patient Position: Sitting)    Pulse (!) 52    Temp 98.1 °F (36.7 °C) (Oral)    Resp 14    Ht 5' 1\" (1.549 m)    Wt 97 lb 9.6 oz (44.3 kg)    SpO2 100%    BMI 18.44 kg/m2   BP at baseline.     Results for orders placed or performed in visit on 03/01/18   AMB POC URINALYSIS DIP STICK MANUAL W/O MICRO   Result Value Ref Range    Color (UA POC) Yellow     Clarity (UA POC) Clear     Glucose (UA POC) Negative Negative    Bilirubin (UA POC) Negative Negative    Ketones (UA POC) Negative Negative Specific gravity (UA POC) 1.010 1.001 - 1.035    Blood (UA POC) Trace Negative    pH (UA POC) 6.0 4.6 - 8.0    Protein (UA POC) Negative Negative    Urobilinogen (UA POC) 0.2 mg/dL 0.2 - 1    Nitrites (UA POC) Negative Negative    Leukocyte esterase (UA POC) Negative Negative     General - Alert and in no acute distress. Pt appears well, comfortable, and in good spirits. Pleasant, engaging. Nontoxic. Not anxious, non-diaphoretic. Mental status - Appropriate mood, behavior, speech content, dress, and thought processes. Pulm - No tachypnea, retractions, or cyanosis. Good respiratory effort. No CVAt bilat. Assessment and Plan  1. Urinary frequency, dysuria, bladder pressure - UA fairly unremarkable. Discussed differentials. Opted to stay hydrated and use pyridium if needed. If persists or worsens will retest. Ultimately will send to uro or GYN if warranted pending sxs/progression. Pt happily agrees with plan. PLEASE NOTE:   This document has been produced using voice recognition software. Unrecognized errors in transcription may be present.     [employer] of [address] [place] [address]  [phone]  3/1/2018

## 2018-04-09 RX ORDER — CYCLOBENZAPRINE HCL 10 MG
5 TABLET ORAL
Qty: 30 TAB | Refills: 0 | Status: SHIPPED | OUTPATIENT
Start: 2018-04-09 | End: 2019-09-28 | Stop reason: SDUPTHER

## 2018-04-09 NOTE — TELEPHONE ENCOUNTER
Last Visit: 03/01/2018 with RASHAD Ruelas    Next Appointment: 10/26/2018 with MD Noman Boogie   Previous Refill Encounters: 11/02/2015 per MD Noman Boogie #[id number] with 1 refill    Requested Prescriptions     Pending Prescriptions Disp Refills    cyclobenzaprine (FLEXERIL) 10 mg tablet 30 Tab 0     Sig: Take 0.5 Tabs by mouth three (3) times daily as needed for Muscle Spasm(s).

## 2018-05-01 RX ORDER — CLOPIDOGREL BISULFATE 75 MG/1
TABLET ORAL
Qty: 30 TAB | Refills: 11 | Status: SHIPPED | OUTPATIENT
Start: 2018-05-01 | End: 2018-05-02 | Stop reason: SDUPTHER

## 2018-05-02 ENCOUNTER — OFFICE VISIT (OUTPATIENT)
Dept: CARDIOLOGY CLINIC | Age: 63
End: 2018-05-02

## 2018-05-02 VITALS
HEART RATE: 59 BPM | DIASTOLIC BLOOD PRESSURE: 65 MMHG | WEIGHT: 98 LBS | BODY MASS INDEX: 18.5 KG/M2 | OXYGEN SATURATION: 98 % | SYSTOLIC BLOOD PRESSURE: 110 MMHG | HEIGHT: 61 IN

## 2018-05-02 DIAGNOSIS — E78.5 DYSLIPIDEMIA: ICD-10-CM

## 2018-05-02 DIAGNOSIS — E78.5 HYPERLIPIDEMIA LDL GOAL <100: ICD-10-CM

## 2018-05-02 DIAGNOSIS — I25.10 CORONARY ARTERY DISEASE INVOLVING NATIVE CORONARY ARTERY OF NATIVE HEART WITHOUT ANGINA PECTORIS: Primary | ICD-10-CM

## 2018-05-02 RX ORDER — CLOPIDOGREL BISULFATE 75 MG/1
TABLET ORAL
Qty: 90 TAB | Refills: 4 | Status: SHIPPED | OUTPATIENT
Start: 2018-05-02 | End: 2019-05-25 | Stop reason: SDUPTHER

## 2018-05-02 RX ORDER — METOPROLOL TARTRATE 25 MG/1
12.5 TABLET, FILM COATED ORAL EVERY 12 HOURS
Qty: 90 TAB | Refills: 4 | Status: SHIPPED | OUTPATIENT
Start: 2018-05-02 | End: 2019-05-25 | Stop reason: SDUPTHER

## 2018-05-02 NOTE — MR AVS SNAPSHOT
[address] [id number] [address] [address] 
[phone] Patient: Latasha Alston MRN: [id number] FCT:[id number] Visit Information Date & Time Provider Department Dept. Phone Encounter #  
 5/2/2018  2:40 PM Luan Camara, [facility] [id number] Follow-up Instructions Return in about 6 months (around 11/2/2018), or if symptoms worsen or fail to improve. Your Appointments 10/26/2018  9:15 AM  
PHYSICAL with Myla Raygoza MD  
[facility]) Appt Note: rpe  
 [address] [facility] [address] [address]  
  
   
 [address], [address] Upcoming Health Maintenance Date Due DTaP/Tdap/Td series (1 - Tdap) 5/6/2006 COLONOSCOPY 11/6/2016 Influenza Age 5 to Adult 8/1/2018 BREAST CANCER SCRN MAMMOGRAM 8/30/2019 PAP AKA CERVICAL CYTOLOGY 10/23/2020 Allergies as of 5/2/2018  Review Complete On: 5/2/2018 By: Luan Camara, DO No Known Allergies Current Immunizations  Reviewed on 10/23/2017 Name Date Influenza Vaccine Whole 12/12/2003 TD Vaccine 5/5/2006 Zoster Vaccine, Live 10/16/2015  2:16 PM  
  
 Not reviewed this visit You Were Diagnosed With   
  
 Codes Comments Coronary artery disease involving native coronary artery of native heart without angina pectoris    -  Primary ICD-10-CM: I25.10 ICD-9-CM: 414.01 Hyperlipidemia LDL goal <100     ICD-10-CM: E78.5 ICD-9-CM: 272.4 Dyslipidemia     ICD-10-CM: E78.5 ICD-9-CM: 272.4 Vitals BP Pulse Height(growth percentile) Weight(growth percentile) SpO2 BMI  
 (!) 86/71 (!) 59 5' 1\" (1.549 m) 98 lb (44.5 kg) 98% 18.52 kg/m2 OB Status Smoking Status Hysterectomy Never Smoker Vitals History BMI and BSA Data Body Mass Index Body Surface Area 18.52 kg/m 2 1.38 m 2 [facility]erred [facility] [phone] Your Updated Medication List  
  
   
This list is accurate as of 5/2/18  3:33 PM.  Always use your most recent med list.  
  
  
  
  
 aspirin 81 mg chewable tablet Take 1 Tab by mouth daily. atorvastatin 40 mg tablet Commonly known as:  LIPITOR  
take 1 tablet by mouth NIGHTLY CALTRATE 600 PO Take 1 Tab by mouth two (2) times a day. clopidogrel 75 mg Tab Commonly known as:  PLAVIX  
take 1 tablet by mouth once daily  
  
 cyclobenzaprine 10 mg tablet Commonly known as:  FLEXERIL Take 0.5 Tabs by mouth three (3) times daily as needed for Muscle Spasm(s). metoprolol tartrate 25 mg tablet Commonly known as:  LOPRESSOR Take 0.5 Tabs by mouth every twelve (12) hours. multivitamin tablet Commonly known as:  ONE A DAY Take 1 Tab by mouth daily. nitroglycerin 0.4 mg SL tablet Commonly known as:  NITROSTAT  
1 Tab by SubLINGual route as needed for Chest Pain. SUMAtriptan 100 mg tablet Commonly known as:  IMITREX Take 1 Tab by mouth once as needed for Migraine for up to 1 dose. Prescriptions Sent to Pharmacy Refills  
 clopidogrel (PLAVIX) 75 mg tab 4 Sig: take 1 tablet by mouth once daily Class: Normal  
 [id number] [address] Ph #: [phone]  
 metoprolol tartrate (LOPRESSOR) 25 mg tablet 4 Sig: Take 0.5 Tabs by mouth every twelve (12) hours. Class: Normal  
 Pharmacy: [id number] [address] [facility], [facility] Ph #: [phone] Route: Oral  
  
We Performed the Following AMB POC EKG ROUTINE W/ 12 LEADS, INTER & REP [06894 CPT(R)] Follow-up Instructions Return in about 6 months (around 11/2/2018), or if symptoms worsen or fail to improve. Introducing [facility]! Dear Lashon Ball: 
Thank you for requesting a [employer] account. Our records indicate that you already have an active [employer] account. You can access your account anytime at [email or web address] Did you know that you can access your hospital and ER discharge instructions at any time in [employer]? You can also review all of your test results from your hospital stay or ER visit. Additional Information If you have questions, please visit the Frequently Asked Questions section of the [employer] website at [email or web address] Remember, [employer] is NOT to be used for urgent needs. For medical emergencies, dial 911. Now available from your iPhone and Android! Please provide this summary of care documentation to your next provider. Your primary care clinician is listed as Josafat Romano. If you have any questions after today's visit, please call [phone].

## 2018-05-02 NOTE — PROGRESS NOTES
HPI: I saw Gamaliel Howard in my office today in   cardiovascular evaluation regarding her chronic coronary artery disease. Ms. Sallie Howard is a very pleasant 57year old small  female, who began having some exertional chest tightness on 6/4/16, which progressed to more discomfort with less exertion and prompting a hospitalization on 6/6/16. Kandice Ferguson had only a very minimal increase in her biomarkers, but her history prompted a pharmacologic myocardial perfusion study, which demonstrated some inferior wall signs of ischemia and ultimately a cardiac catheterization by my associate Dr. Louis Savage and findings with the following anatomy:      1. Patent left main trunk. 2. 30% mid LAD obstruction. 3. Very small angiographically normal ramus intermediate range. 4. Diagonal branches were angiographically normal.  5. 20% smooth mid circumflex obstruction with patent obtuse marginal branches. 6. 99% stenosis at the junction of the mid and distal third of the vessel of TIMI2 flow.      The patient subsequently had stenting with 3.0 x 18 mm and 3.0 x 8 mm Xience stents with improvement of TIMI2 flow to TIMI3 flow and 0 residual.       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 Encounter Diagnoses   Name Primary?  Coronary artery disease involving native coronary artery of native heart without angina pectoris Yes    Hyperlipidemia LDL goal <100     Dyslipidemia        Discussion: This patient appears to be doing about as well as we could expect and I really have no recommendations for change at this time. She is not having any symptoms to suggest development of recurrent symptomatic obstructive coronary artery disease at this time.     Her latest lipid profile which was completed on October 16, 2017 and should be repeated again in the near future showed total cholesterol of 163 with triglycerides of 92, HDL of 73, LDL of 71.6, and VLDL of 18.4 which I think is good control on Lipitor 40 mg daily. I think a case could be made for more aggressive LDL lowering, but with such a high HDL I think these levels are reasonable. Her blood pressure is little low when she taken by my staff but I checked it again and got 110/65 her EKG appears to be within normal limits so I am simply going to plan to see her again in several months. PCP: Danny Crowley MD      Past Medical History:   Diagnosis Date    Acute coronary syndrome ([facility] [address] 16    stent, right coronary artery, 99% occlusion    Allergic rhinitis     Asthma     CAD (coronary artery disease)     Cancer ([facility] [address]     cervical    Carpal tunnel syndrome     Dyslipidemia     mild    Headache(784.0)     migraine    Hearing loss in right ear     sudden right hearing loss associated with vertigo    Impingement syndrome of shoulder, bursitis, left shoulder 2010       Past Surgical History:   Procedure Laterality Date    ENDOSCOPY, COLON, DIAGNOSTIC      HX  SECTION      X2    HX CORONARY STENT PLACEMENT  16    3.0 x 18 mm Xience stent and 3.0 x 8 mm Xience stent    HX HEART CATHETERIZATION  16    99% RCA    HX HYSTERECTOMY      for cervical cancer; pelvic exams Marjan Barraza    HX ORTHOPAEDIC      left knee surgery arthroscopy    HX ORTHOPAEDIC      carpal tunnel left     HX PELVIC LAPAROSCOPY      X2    HX TONSILLECTOMY         Current Outpatient Rx   Name  Route  Sig  Dispense  Refill    atorvastatin (LIPITOR) 40 mg tablet    Oral    Take 1 Tab by mouth nightly. 30 Tab    6      metoprolol tartrate (LOPRESSOR) 25 mg tablet    Oral    Take 0.5 Tabs by mouth every twelve (12) hours. 30 Tab    6      ticagrelor (BRILINTA) 90 mg tablet    Oral    Take 1 Tab by mouth two (2) times a day.     60 Tab    11      aspirin 81 mg chewable tablet    Oral    Take 1 Tab by mouth daily. 30 Tab    1      nitroglycerin (NITROSTAT) 0.4 mg SL tablet    SubLINGual    1 Tab by SubLINGual route as needed for Chest Pain. 1 Bottle    1      multivitamin (ONE A DAY) tablet    Oral    Take 1 Tab by mouth daily.  SUMAtriptan (IMITREX) 100 mg tablet    Oral    Take 1 Tab by mouth once as needed for Migraine for up to 1 dose. 9 Tab    2      CALCIUM CARBONATE (CALTRATE 600 PO)    Oral    Take 1 Tab by mouth two (2) times a day. No Known Allergies    Social History:   Social History   Substance Use Topics    Smoking status: Never Smoker    Smokeless tobacco: Never Used    Alcohol use Yes      Comment: occassionally         Family history: family history includes Cancer in her father, mother, and another family member; Heart Disease in her father. Review of Systems:     Constitutional: Negative for chills, fever, malaise/fatigue and weight loss. Respiratory: Negative for cough, hemoptysis, shortness of breath and wheezing. Cardiovascular: Negative for chest pain, palpitations, orthopnea and leg swelling. Gastrointestinal: Negative. Musculoskeletal: Negative for falls, joint pain and myalgias. Neurological: Negative for dizziness. Physical Exam:   The patient is an alert, oriented, well developed, well nourished 57 y.o.  female who was in no acute distress at the time of my examination. Visit Vitals    BP (!) 86/71    Pulse (!) 59    Ht 5' 1\" (1.549 m)    Wt 44.5 kg (98 lb)    SpO2 98%    BMI 18.52 kg/m2      BP Readings from Last 3 Encounters:   05/02/18 (!) 86/71   03/01/18 102/58   10/23/17 96/60      Wt Readings from Last 3 Encounters:   05/02/18 44.5 kg (98 lb)   03/01/18 44.3 kg (97 lb 9.6 oz)   10/23/17 44.5 kg (98 lb 3.2 oz)     HEENT: Conjuctiva white, mucosa moist, no pallor or cyanosis. NECK: Supple without masses, tenderness or thyromegaly.  There was no jugular venous distention. Carotid are full bilaterally without bruits. CHEST: Symmetrical with good excursion. LUNGS: Clear to auscultation in all fields. HEART: Regular rate and rhythm. The apex is not displaced. There were no lifts, thrills or heaves. There is a normal S1 and S2 without appreciable murmurs, rubs, clicks, or gallops auscultated. ABDOMEN: Soft without masses, tenderness or organomegaly. EXTREMITIES: Full peripheral pulses without peripheral edema. INTEGUMENT: Skin is warm and dry   NEUROLOGICAL: The patient was oriented x3 with motor function grossly intact. Review of Data: See PMH and Cardiology and Imaging sections for cardiac testing  Lab Results   Component Value Date/Time    Cholesterol, total 163 10/16/2017 07:28 AM    HDL Cholesterol 73 (H) 10/16/2017 07:28 AM    LDL, calculated 71.6 10/16/2017 07:28 AM    Triglyceride 92 10/16/2017 07:28 AM    CHOL/HDL Ratio 2.2 10/16/2017 07:28 AM       Results for orders placed or performed in visit on 05/02/18   AMB POC EKG ROUTINE W/ 12 LEADS, INTER & REP     Status: None    Narrative    Sinus bradycardia, rate 59. This EKG is within normal limits and similar to the EKG of September 25, 2017. [facility] ROBERTA Walker., F.A.C.C. [facility]  [facility] [place]  [facility]. [address] [place]  [phone]    PLEASE NOTE:  This document has been produced using voice recognition software. Unrecognized errors in transcription may be present.

## 2018-05-02 NOTE — PROGRESS NOTES
1. Have you been to the ER, urgent care clinic since your last visit? Hospitalized since your last visit?no    2. Have you seen or consulted any other health care providers outside of the [address] [place] [address] since your last visit? Include any pap smears or colon screening.  no

## 2018-05-23 RX ORDER — ALBUTEROL SULFATE 90 UG/1
1-2 AEROSOL, METERED RESPIRATORY (INHALATION)
Qty: 1 INHALER | Refills: 0 | Status: SHIPPED | OUTPATIENT
Start: 2018-05-23 | End: 2019-09-28 | Stop reason: SDUPTHER

## 2018-05-23 NOTE — TELEPHONE ENCOUNTER
Last Visit: 03/01/2018 with RASHAD Ruelas    Next Appointment: 10/26/2018 with MD Urmila Baez   Previous Refill Encounters: 08/25/2014 per RASHAD Ruelas 1 inhaler    Requested Prescriptions     Pending Prescriptions Disp Refills    albuterol (PROVENTIL HFA, VENTOLIN HFA, PROAIR HFA) 90 mcg/actuation inhaler 1 Inhaler 0     Sig: Take 1-2 Puffs by inhalation every six (6) hours as needed for Wheezing.

## 2018-05-23 NOTE — TELEPHONE ENCOUNTER
Patient calling asking for a refill on her inhaler. Says its been a while since she had to use it and when she went to use it yesterday it was .

## 2018-08-20 RX ORDER — SUMATRIPTAN 100 MG/1
TABLET, FILM COATED ORAL
Qty: 9 TAB | Refills: 2 | Status: SHIPPED | OUTPATIENT
Start: 2018-08-20 | End: 2019-06-25 | Stop reason: SDUPTHER

## 2018-09-25 ENCOUNTER — HOSPITAL ENCOUNTER (OUTPATIENT)
Dept: BONE DENSITY | Age: 63
Discharge: HOME | End: 2018-09-25
Attending: ADVANCED PRACTICE MIDWIFE
Payer: COMMERCIAL

## 2018-09-25 DIAGNOSIS — M81.0 AGE RELATED OSTEOPOROSIS: ICD-10-CM

## 2018-09-25 PROCEDURE — 77080 DXA BONE DENSITY AXIAL: CPT

## 2018-10-16 DIAGNOSIS — E78.5 HYPERLIPIDEMIA LDL GOAL <100: ICD-10-CM

## 2018-10-16 DIAGNOSIS — Z00.00 ROUTINE GENERAL MEDICAL EXAMINATION AT A HEALTH CARE FACILITY: ICD-10-CM

## 2018-10-16 DIAGNOSIS — I25.10 CORONARY ARTERY DISEASE INVOLVING NATIVE CORONARY ARTERY OF NATIVE HEART WITHOUT ANGINA PECTORIS: ICD-10-CM

## 2018-10-29 RX ORDER — ATORVASTATIN CALCIUM 40 MG/1
TABLET, FILM COATED ORAL
Qty: 90 TAB | Refills: 3 | Status: SHIPPED | OUTPATIENT
Start: 2018-10-29 | End: 2019-11-04 | Stop reason: SDUPTHER

## 2018-11-06 ENCOUNTER — OFFICE VISIT (OUTPATIENT)
Dept: CARDIOLOGY CLINIC | Age: 63
End: 2018-11-06

## 2018-11-06 VITALS
DIASTOLIC BLOOD PRESSURE: 58 MMHG | SYSTOLIC BLOOD PRESSURE: 102 MMHG | HEART RATE: 73 BPM | BODY MASS INDEX: 18.5 KG/M2 | HEIGHT: 61 IN | OXYGEN SATURATION: 97 % | WEIGHT: 98 LBS

## 2018-11-06 DIAGNOSIS — I25.10 CORONARY ARTERY DISEASE INVOLVING NATIVE CORONARY ARTERY OF NATIVE HEART WITHOUT ANGINA PECTORIS: Primary | ICD-10-CM

## 2018-11-06 DIAGNOSIS — Z95.5 HISTORY OF CORONARY ARTERY STENT PLACEMENT: ICD-10-CM

## 2018-11-06 DIAGNOSIS — E78.5 DYSLIPIDEMIA: ICD-10-CM

## 2018-11-06 NOTE — PROGRESS NOTES
HPI: I saw Hakan Craft. Lupe Freitas in my office today in  
cardiovascular evaluation regarding her chronic coronary artery disease. Ms. Lupe Freitas is a very pleasant 70year old small  female, who began having some exertional chest tightness on 6/4/16, which progressed to more discomfort with less exertion and prompting a hospitalization on 6/6/16. Norma Johnston had only a very minimal increase in her biomarkers, but her history prompted a pharmacologic myocardial perfusion study, which demonstrated some inferior wall signs of ischemia and ultimately a cardiac catheterization by my associate Dr. Rosy Earl and findings with the following anatomy: 
   
1. Patent left main trunk. 2. 30% mid LAD obstruction. 3. Very small angiographically normal ramus intermediate range. 4. Diagonal branches were angiographically normal. 
5. 20% smooth mid circumflex obstruction with patent obtuse marginal branches. 6. 99% stenosis at the junction of the mid and distal third of the vessel of TIMI2 flow. 
   
The patient subsequently had stenting with 3.0 x 18 mm and 3.0 x 8 mm Xience stents with improvement of TIMI2 flow to TIMI3 flow and 0 residual.  
 
She comes into the office today and relates that she is really doing very well. She is remaining quite active and denies any cardiovascular symptomatology at this time. Encounter Diagnoses Name Primary?  Coronary artery disease involving native coronary artery of native heart without angina pectoris Yes  History of coronary artery stent placement  Dyslipidemia Discussion: This lady appears to be doing about as well as we could expect and really of no recommendations for change currently. She is not having any symptoms to suggest the development of recurrent symptomatic obstructive coronary disease or really any other cardiovascular symptomatology.  
 
Her latest lipid profile that have a copy of was actually done in October of last year and she is due to have it done again next month. Her total cholesterol went completed 2017 was 163 with triglycerides of 92, HDL 73, LDL of 71.6, and VLDL of 18.4 which I think is reasonably good control on Lipitor 40 mg daily. Her blood pressure completely normal and since she is tolerating her present medications including Plavix, aspirin, Lopressor, and Lipitor well I will simply plan to see her again in several months at which time it will be nearly 3 years from her intervention and we will discuss at that time the possibility of doing some screening test to rule out recurrent ischemia. PCP: Kayden Holley MD 
 
 
Past Medical History:  
Diagnosis Date  Acute coronary syndrome ([facility] [address] 16  
 stent, right coronary artery, 99% occlusion  Allergic rhinitis  Asthma  CAD (coronary artery disease)  Cancer ([facility] [address]   
 cervical  
 Carpal tunnel syndrome  Dyslipidemia   
 mild  Headache(784.0)   
 migraine  Hearing loss in right ear   
 sudden right hearing loss associated with vertigo  Impingement syndrome of shoulder, bursitis, left shoulder 2010 Past Surgical History:  
Procedure Laterality Date  ENDOSCOPY, COLON, DIAGNOSTIC    HX  SECTION X2  
 HX CORONARY STENT PLACEMENT  16 3.0 x 18 mm Xience stent and 3.0 x 8 mm Xience stent  HX HEART CATHETERIZATION  16  
 99% RCA [address]  
 for cervical cancer; pelvic exams Wanda Miguel  HX ORTHOPAEDIC    
 left knee surgery arthroscopy  HX ORTHOPAEDIC    
 carpal tunnel left  HX PELVIC LAPAROSCOPY X2  
 HX TONSILLECTOMY Current Outpatient Medications Medication Sig  
 atorvastatin (LIPITOR) 40 mg tablet take 1 tablet by mouth NIGHTLY (Patient taking differently: take half a tablet by mouth NIGHTLY)  SUMAtriptan (IMITREX) 100 mg tablet take 1 tablet by mouth ONCE AS NEEDED FOR MIGRAINE for UP to 1 dose  albuterol (PROVENTIL HFA, VENTOLIN HFA, PROAIR HFA) 90 mcg/actuation inhaler Take 1-2 Puffs by inhalation every six (6) hours as needed for Wheezing.  clopidogrel (PLAVIX) 75 mg tab take 1 tablet by mouth once daily  metoprolol tartrate (LOPRESSOR) 25 mg tablet Take 0.5 Tabs by mouth every twelve (12) hours.  cyclobenzaprine (FLEXERIL) 10 mg tablet Take 0.5 Tabs by mouth three (3) times daily as needed for Muscle Spasm(s).  nitroglycerin (NITROSTAT) 0.4 mg SL tablet 1 Tab by SubLINGual route as needed for Chest Pain.  aspirin 81 mg chewable tablet Take 1 Tab by mouth daily.  multivitamin (ONE A DAY) tablet Take 1 Tab by mouth daily.  CALCIUM CARBONATE (CALTRATE 600 PO) Take 1 Tab by mouth two (2) times a day. No current facility-administered medications for this visit. No Known Allergies Social History:  
Social History Tobacco Use  Smoking status: Never Smoker  Smokeless tobacco: Never Used Substance Use Topics  Alcohol use: Yes Comment: occassionally Family history: family history includes Cancer in her father, mother, and another family member; Heart Disease in her father. Review of Systems:  
  Constitutional: Negative for chills, fever, malaise/fatigue and weight loss. Respiratory: Negative for cough, hemoptysis, shortness of breath and wheezing. Cardiovascular: Negative for chest pain, palpitations, orthopnea and leg swelling. Gastrointestinal: Negative. Musculoskeletal: Negative for falls, joint pain and myalgias. Neurological: Negative for dizziness. Physical Exam:  
The patient is an alert, oriented, well developed, well nourished 70 y.o.  female who was in no acute distress at the time of my examination. Visit Vitals /58 Pulse 73 Ht 5' 1\" (1.549 m) Wt 44.5 kg (98 lb) SpO2 97% BMI 18.52 kg/m² BP Readings from Last 3 Encounters:  
11/06/18 102/58  
05/02/18 110/65  
03/01/18 102/58 Wt Readings from Last 3 Encounters:  
11/06/18 44.5 kg (98 lb) 05/02/18 44.5 kg (98 lb) 03/01/18 44.3 kg (97 lb 9.6 oz) HEENT: Conjuctiva white, mucosa moist, no pallor or cyanosis. NECK: Supple without masses, tenderness or thyromegaly. There was no jugular venous distention. Carotid are full bilaterally without bruits. CHEST: Symmetrical with good excursion. LUNGS: Clear to auscultation in all fields. HEART: Regular rate and rhythm. The apex is not displaced. There were no lifts, thrills or heaves. There is a normal S1 and S2 without appreciable murmurs, rubs, clicks, or gallops auscultated. ABDOMEN: Soft without masses, tenderness or organomegaly. EXTREMITIES: Full peripheral pulses without peripheral edema. INTEGUMENT: Skin is warm and dry NEUROLOGICAL: The patient was oriented x3 with motor function grossly intact. Review of Data: See PMH and Cardiology and Imaging sections for cardiac testing Lab Results Component Value Date/Time Cholesterol, total 163 10/16/2017 07:28 AM  
 HDL Cholesterol 73 (H) 10/16/2017 07:28 AM  
 LDL, calculated 71.6 10/16/2017 07:28 AM  
 Triglyceride 92 10/16/2017 07:28 AM  
 CHOL/HDL Ratio 2.2 10/16/2017 07:28 AM  
 
 
Results for orders placed or performed in visit on 11/06/18 AMB POC EKG ROUTINE W/ 12 LEADS, INTER & REP     Status: None Narrative Normal sinus rhythm, rate 73. This EKG is within normal limits and similar to the EKG of May 2, 2018. Shruthi Eason D.O., F.A.C.C. [facility] [address] [place] [address] and [facility] [place] [address] [address] [phone] B  [phone] PLEASE NOTE:  This document has been produced using voice recognition software. Unrecognized errors in transcription may be present.

## 2018-11-28 ENCOUNTER — HOSPITAL ENCOUNTER (OUTPATIENT)
Dept: LAB | Age: 63
Discharge: HOME | End: 2018-11-28
Payer: COMMERCIAL

## 2018-11-28 LAB
ALBUMIN SERPL-MCNC: 4 G/DL (ref 3.4–5)
ALBUMIN/GLOB SERPL: 1.7 {RATIO} (ref 0.8–1.7)
ALP SERPL-CCNC: 40 U/L (ref 45–117)
ALT SERPL-CCNC: 33 U/L (ref 13–56)
ANION GAP SERPL CALC-SCNC: 2 MMOL/L (ref 3–18)
AST SERPL-CCNC: 18 U/L (ref 15–37)
BASOPHILS # BLD: 0.1 K/UL (ref 0–0.1)
BASOPHILS NFR BLD: 2 % (ref 0–2)
BILIRUB SERPL-MCNC: 0.3 MG/DL (ref 0.2–1)
BUN SERPL-MCNC: 14 MG/DL (ref 7–18)
BUN/CREAT SERPL: 20 (ref 12–20)
CALCIUM SERPL-MCNC: 8.7 MG/DL (ref 8.5–10.1)
CHLORIDE SERPL-SCNC: 103 MMOL/L (ref 100–108)
CHOLEST SERPL-MCNC: 153 MG/DL
CO2 SERPL-SCNC: 33 MMOL/L (ref 21–32)
CREAT SERPL-MCNC: 0.69 MG/DL (ref 0.6–1.3)
DIFFERENTIAL METHOD BLD: ABNORMAL
EOSINOPHIL # BLD: 0.3 K/UL (ref 0–0.4)
EOSINOPHIL NFR BLD: 6 % (ref 0–5)
ERYTHROCYTE [DISTWIDTH] IN BLOOD BY AUTOMATED COUNT: 13.6 % (ref 11.6–14.5)
GLOBULIN SER CALC-MCNC: 2.3 G/DL (ref 2–4)
GLUCOSE SERPL-MCNC: 84 MG/DL (ref 74–99)
HCT VFR BLD AUTO: 42 % (ref 35–45)
HDLC SERPL-MCNC: 79 MG/DL (ref 40–60)
HDLC SERPL: 1.9 {RATIO} (ref 0–5)
HGB BLD-MCNC: 13.7 G/DL (ref 12–16)
LDLC SERPL CALC-MCNC: 61.2 MG/DL (ref 0–100)
LIPID PROFILE,FLP: ABNORMAL
LYMPHOCYTES # BLD: 1.5 K/UL (ref 0.9–3.6)
LYMPHOCYTES NFR BLD: 31 % (ref 21–52)
MCH RBC QN AUTO: 29.3 PG (ref 24–34)
MCHC RBC AUTO-ENTMCNC: 32.6 G/DL (ref 31–37)
MCV RBC AUTO: 89.9 FL (ref 74–97)
MONOCYTES # BLD: 0.5 K/UL (ref 0.05–1.2)
MONOCYTES NFR BLD: 10 % (ref 3–10)
NEUTS SEG # BLD: 2.6 K/UL (ref 1.8–8)
NEUTS SEG NFR BLD: 51 % (ref 40–73)
PLATELET # BLD AUTO: 232 K/UL (ref 135–420)
PMV BLD AUTO: 10.8 FL (ref 9.2–11.8)
POTASSIUM SERPL-SCNC: 4.6 MMOL/L (ref 3.5–5.5)
PROT SERPL-MCNC: 6.3 G/DL (ref 6.4–8.2)
RBC # BLD AUTO: 4.67 M/UL (ref 4.2–5.3)
SODIUM SERPL-SCNC: 138 MMOL/L (ref 136–145)
TRIGL SERPL-MCNC: 64 MG/DL (ref ?–150)
VLDLC SERPL CALC-MCNC: 12.8 MG/DL
WBC # BLD AUTO: 5.1 K/UL (ref 4.6–13.2)

## 2018-11-28 PROCEDURE — 80061 LIPID PANEL: CPT

## 2018-11-28 PROCEDURE — 36415 COLL VENOUS BLD VENIPUNCTURE: CPT

## 2018-11-28 PROCEDURE — 80053 COMPREHEN METABOLIC PANEL: CPT

## 2018-11-28 PROCEDURE — 85025 COMPLETE CBC W/AUTO DIFF WBC: CPT

## 2018-12-05 ENCOUNTER — OFFICE VISIT (OUTPATIENT)
Dept: INTERNAL MEDICINE CLINIC | Age: 63
End: 2018-12-05

## 2018-12-05 VITALS
RESPIRATION RATE: 12 BRPM | TEMPERATURE: 98.1 F | HEART RATE: 60 BPM | HEIGHT: 61 IN | DIASTOLIC BLOOD PRESSURE: 60 MMHG | WEIGHT: 98.6 LBS | BODY MASS INDEX: 18.61 KG/M2 | OXYGEN SATURATION: 99 % | SYSTOLIC BLOOD PRESSURE: 106 MMHG

## 2018-12-05 DIAGNOSIS — Z86.69 HISTORY OF MIGRAINE HEADACHES: ICD-10-CM

## 2018-12-05 DIAGNOSIS — I25.10 CORONARY ARTERY DISEASE INVOLVING NATIVE CORONARY ARTERY OF NATIVE HEART WITHOUT ANGINA PECTORIS: ICD-10-CM

## 2018-12-05 DIAGNOSIS — Z12.11 ENCOUNTER FOR SCREENING COLONOSCOPY: ICD-10-CM

## 2018-12-05 DIAGNOSIS — Z23 ENCOUNTER FOR IMMUNIZATION: ICD-10-CM

## 2018-12-05 DIAGNOSIS — Z00.00 ROUTINE GENERAL MEDICAL EXAMINATION AT A HEALTH CARE FACILITY: Primary | ICD-10-CM

## 2018-12-05 DIAGNOSIS — E78.5 HYPERLIPIDEMIA LDL GOAL <100: ICD-10-CM

## 2018-12-05 RX ORDER — NITROGLYCERIN 0.4 MG/1
0.4 TABLET SUBLINGUAL AS NEEDED
Qty: 1 BOTTLE | Refills: 3 | Status: SHIPPED | OUTPATIENT
Start: 2018-12-05 | End: 2019-11-06 | Stop reason: SDUPTHER

## 2018-12-05 NOTE — PROGRESS NOTES
Luis A Argueta 1955, is a 70 y.o. female, who is seen today for a routine physical exam and follow-up on atherosclerotic heart disease migraine headaches osteopenia hyperlipidemia. She feels well but does look forward to retiring from teaching in . She developed acute coronary syndrome about 2 years ago and stenting was done and she has been asymptomatic since then. She has not required nitroglycerin since she got out of the hospital.  She takes all her medicine correctly. She has a history of migraine headaches and uses sumatriptan when needed. 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 She was scheduled to have screening colonoscopy and then had heart problems and then had influenza and finally is now able to have colonoscopy and she certainly can be off Plavix and aspirin well ahead of the procedure. Past Medical History:  
Diagnosis Date  Acute coronary syndrome ([address] 16  
 stent, right coronary artery, 99% occlusion  Allergic rhinitis  Asthma  CAD (coronary artery disease)  Cancer ([facility] [address]   
 cervical  
 Carpal tunnel syndrome  Dyslipidemia   
 mild  Headache(784.0)   
 migraine  Hearing loss in right ear   
 sudden right hearing loss associated with vertigo  Impingement syndrome of shoulder, bursitis, left shoulder 2010 Past Surgical History:  
Procedure Laterality Date  ENDOSCOPY, COLON, DIAGNOSTIC    HX  SECTION X2  
 HX CORONARY STENT PLACEMENT  16 3.0 x 18 mm Xience stent and 3.0 x 8 mm Xience stent  HX HEART CATHETERIZATION  16  
 99% RCA [address]  
 for cervical cancer; pelvic exams [facility]  HX ORTHOPAEDIC    
 left knee surgery arthroscopy  HX ORTHOPAEDIC    
 carpal tunnel left  HX PELVIC LAPAROSCOPY X2  
 HX TONSILLECTOMY Current Outpatient Medications Medication Sig Dispense Refill  nitroglycerin (NITROSTAT) 0.4 mg SL tablet 1 Tab by SubLINGual route as needed for Chest Pain. 1 Bottle 3  
 atorvastatin (LIPITOR) 40 mg tablet take 1 tablet by mouth NIGHTLY (Patient taking differently: take half a tablet by mouth NIGHTLY) 90 Tab 3  
 SUMAtriptan (IMITREX) 100 mg tablet take 1 tablet by mouth ONCE AS NEEDED FOR MIGRAINE for UP to 1 dose 9 Tab 2  
 albuterol (PROVENTIL HFA, VENTOLIN HFA, PROAIR HFA) 90 mcg/actuation inhaler Take 1-2 Puffs by inhalation every six (6) hours as needed for Wheezing. 1 Inhaler 0  
 clopidogrel (PLAVIX) 75 mg tab take 1 tablet by mouth once daily 90 Tab 4  
 metoprolol tartrate (LOPRESSOR) 25 mg tablet Take 0.5 Tabs by mouth every twelve (12) hours. 90 Tab 4  cyclobenzaprine (FLEXERIL) 10 mg tablet Take 0.5 Tabs by mouth three (3) times daily as needed for Muscle Spasm(s). 30 Tab 0  
 aspirin 81 mg chewable tablet Take 1 Tab by mouth daily. 30 Tab 1  
 multivitamin (ONE A DAY) tablet Take 1 Tab by mouth daily.  CALCIUM CARBONATE (CALTRATE 600 PO) Take 1 Tab by mouth two (2) times a day. No Known Allergies Social History Socioeconomic History  Marital status:  Spouse name: Not on file  Number of children: Not on file  Years of education: Not on file  Highest education level: Not on file Tobacco Use  Smoking status: Never Smoker  Smokeless tobacco: Never Used Substance and Sexual Activity  Alcohol use: Yes Comment: occassionally  Drug use: No  
 
Visit Vitals /60 Pulse 60 Temp 98.1 °F (36.7 °C) (Oral) Resp 12 Ht 5' 1\" (1.549 m) Wt 98 lb 9.6 oz (44.7 kg) SpO2 99% BMI 18.63 kg/m² The patient is a well-developed well-nourished female in no apparent distress.  HEENT: Pupils are equal and react to light and extraocular movements are full. Ear canals and tympanic membranes appear normal. Oral cavity appears normal with no oral lesions. Neck: Carotids are 2+ without bruits. No adenopathy or thyromegaly. Lungs are clear to percussion. I hear no wheezing, rales or rhonchi. Heart reveals a regular rhythm with no murmur, gallop, click or rub. The apical impulse is in the fifth interspace at the midclavicular line. Abdomen is soft and nontender with no hepatosplenomegaly or masses. Bowel sounds are normoactive and there is no distention or tympany. Extremities reveal no clubbing cyanosis or edema. Pulses are 2+. Skin reveals no suspicious skin growths. Breasts reveal no masses, skin or nipple abnormalities. No axillary adenopathy. Results for orders placed or performed during the hospital encounter of 11/28/18 CBC WITH AUTOMATED DIFF Result Value Ref Range WBC 5.1 4.6 - 13.2 K/uL  
 RBC 4.67 4.20 - 5.30 M/uL  
 HGB 13.7 12.0 - 16.0 g/dL HCT 42.0 35.0 - 45.0 % MCV 89.9 74.0 - 97.0 FL  
 MCH 29.3 24.0 - 34.0 PG  
 MCHC 32.6 31.0 - 37.0 g/dL  
 RDW 13.6 11.6 - 14.5 % PLATELET 914 673 - 005 K/uL MPV 10.8 9.2 - 11.8 FL  
 NEUTROPHILS 51 40 - 73 % LYMPHOCYTES 31 21 - 52 % MONOCYTES 10 3 - 10 % EOSINOPHILS 6 (H) 0 - 5 % BASOPHILS 2 0 - 2 %  
 ABS. NEUTROPHILS 2.6 1.8 - 8.0 K/UL  
 ABS. LYMPHOCYTES 1.5 0.9 - 3.6 K/UL  
 ABS. MONOCYTES 0.5 0.05 - 1.2 K/UL  
 ABS. EOSINOPHILS 0.3 0.0 - 0.4 K/UL  
 ABS. BASOPHILS 0.1 0.0 - 0.1 K/UL  
 DF AUTOMATED    
LIPID PANEL Result Value Ref Range LIPID PROFILE Cholesterol, total 153 <200 MG/DL Triglyceride 64 <150 MG/DL  
 HDL Cholesterol 79 (H) 40 - 60 MG/DL  
 LDL, calculated 61.2 0 - 100 MG/DL VLDL, calculated 12.8 MG/DL  
 CHOL/HDL Ratio 1.9 0 - 5.0 METABOLIC PANEL, COMPREHENSIVE Result Value Ref Range Sodium 138 136 - 145 mmol/L Potassium 4.6 3.5 - 5.5 mmol/L  Chloride 103 100 - 108 mmol/L  
 CO2 33 (H) 21 - 32 mmol/L  
 Anion gap 2 (L) 3.0 - 18 mmol/L Glucose 84 74 - 99 mg/dL BUN 14 7.0 - 18 MG/DL Creatinine 0.69 0.6 - 1.3 MG/DL  
 BUN/Creatinine ratio 20 12 - 20 GFR est AA >60 >60 ml/min/1.73m2 GFR est non-AA >60 >60 ml/min/1.73m2 Calcium 8.7 8.5 - 10.1 MG/DL Bilirubin, total 0.3 0.2 - 1.0 MG/DL  
 ALT (SGPT) 33 13 - 56 U/L  
 AST (SGOT) 18 15 - 37 U/L Alk. phosphatase 40 (L) 45 - 117 U/L Protein, total 6.3 (L) 6.4 - 8.2 g/dL Albumin 4.0 3.4 - 5.0 g/dL Globulin 2.3 2.0 - 4.0 g/dL A-G Ratio 1.7 0.8 - 1.7 Assessment: ASHD doing well since stent about 2 years ago, she will continue Plavix and aspirin and metoprolol 12.5 mg twice a day but certainly may be off aspirin and Plavix for 10 days prior to colonoscopy. #2. Hyperlipidemia doing very well, she will continue atorvastatin 40 mg daily. #3.  Osteopenia, she will continue Caltrate and weightbearing activities. #4.  History of migraine headaches doing better in recent years, she will use sumatriptan 100 mg when needed. She will receive PCV 13 and influenza vaccine now, PPS V 23 next year and will arrange for screening colonoscopy. Follow-up in 1 year for a physical or sooner if needed Amrik Velasco, [address] Please note: This document has been produced using voice recognition software. Unrecognized errors in transcription may be present.

## 2019-01-10 ENCOUNTER — DOCUMENTATION ONLY (OUTPATIENT)
Dept: CARDIOLOGY CLINIC | Age: 64
End: 2019-01-10

## 2019-01-10 NOTE — PROGRESS NOTES
Verbal order and read back per Luan Code, DO Patient may proceed with procedure May hold plavix 5-7 days prior. Faxed clearance to [phone]

## 2019-05-31 RX ORDER — METOPROLOL TARTRATE 25 MG/1
TABLET, FILM COATED ORAL
Qty: 90 TAB | Refills: 4 | Status: SHIPPED | OUTPATIENT
Start: 2019-05-31 | End: 2020-06-10

## 2019-05-31 RX ORDER — CLOPIDOGREL BISULFATE 75 MG/1
TABLET ORAL
Qty: 90 TAB | Refills: 4 | Status: SHIPPED | OUTPATIENT
Start: 2019-05-31 | End: 2020-06-10

## 2019-06-06 ENCOUNTER — OFFICE VISIT (OUTPATIENT)
Dept: ORTHOPEDIC SURGERY | Age: 64
End: 2019-06-06

## 2019-06-06 VITALS
HEART RATE: 46 BPM | TEMPERATURE: 97.6 F | DIASTOLIC BLOOD PRESSURE: 53 MMHG | SYSTOLIC BLOOD PRESSURE: 109 MMHG | OXYGEN SATURATION: 98 % | HEIGHT: 61 IN | BODY MASS INDEX: 18.88 KG/M2 | WEIGHT: 100 LBS | RESPIRATION RATE: 16 BRPM

## 2019-06-06 DIAGNOSIS — M65.312 TRIGGER THUMB OF LEFT HAND: Primary | ICD-10-CM

## 2019-06-06 NOTE — PATIENT INSTRUCTIONS
Trigger Finger: Care Instructions Your Care Instructions A trigger finger is a finger stuck in a bent position. The bent finger usually straightens out on its own. A trigger finger can be painful, but it normally is not a serious problem. Trigger fingers seem to occur more in some groups of people. These include people who have diabetes or arthritis or who have injured their hands in the past. This problem also occurs in musicians and people who  tools often. Rest, exercises, and other things you can do at home may help your trigger finger relax so that it can bend as it should. You may get a corticosteroid shot. This can reduce swelling and pain. Your doctor may put a splint on your finger. This will give your finger some rest and avoid irritating the joint. You may need surgery if the finger keeps locking in a bent position. Follow-up care is a key part of your treatment and safety. Be sure to make and go to all appointments, and call your doctor if you are having problems. It's also a good idea to know your test results and keep a list of the medicines you take. How can you care for yourself at home? · If your doctor put a splint on your finger, wear the splint as directed. Do not remove it until your doctor says you can. · You may need to change your activities to avoid movements that irritate the finger. · If your finger is swollen, put ice or a cold pack on your finger for 10 to 20 minutes at a time. Try to do this every 1 to 2 hours for the next 3 days (when you are awake) or until the swelling goes down. Put a thin cloth between the ice and your skin. · Prop up your hand on a pillow when you ice it or anytime you sit or lie down during the next 3 days. Try to keep it above the level of your heart. This will help reduce swelling. · Take your medicines exactly as prescribed. Call your doctor if you think you are having a problem with your medicine. · Ask your doctor if you can take an over-the-counter pain medicine, such as acetaminophen (Tylenol), ibuprofen (Advil, Motrin), or naproxen (Aleve). Be safe with medicines. Read and follow all instructions on the label. · If your doctor recommends exercises, do them as directed. When should you call for help? Call your doctor now or seek immediate medical care if: 
  · Your finger locks in a bent position and will not straighten.  
 Watch closely for changes in your health, and be sure to contact your doctor if: 
  · You do not get better as expected. Where can you learn more? Go to http://rob-vane.info/. Enter M826 in the search box to learn more about \"Trigger Finger: Care Instructions. \" Current as of: September 20, 2018 Content Version: 11.9 © 4644-5071 [employer]. Care instructions adapted under license by [employer] (which disclaims liability or warranty for this information). If you have questions about a medical condition or this instruction, always ask your healthcare professional. [address] any warranty or liability for your use of this information.

## 2019-06-06 NOTE — PROGRESS NOTES
1. Have you been to the ER, urgent care clinic since your last visit? Hospitalized since your last visit? No    2. Have you seen or consulted any other health care providers outside of the [address] [place] [address] since your last visit? Include any pap smears or colon screening.  No

## 2019-06-06 NOTE — PROGRESS NOTES
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 Vitals:    19 0818   BP: 109/53   Pulse: (!) 46   Resp: 16   Temp: 97.6 °F (36.4 °C)   TempSrc: Oral   SpO2: 98%   Weight: 100 lb (45.4 kg)   Height: 5' 1\" (1.549 m)   PainSc:   6   PainLoc: Finger           Chief Complaint   Patient presents with    Thumb Pain     LEFT THUMB PAIN         HPI: Patient comes in today with complaints of left thumb locking and pain. She reports is been going on for 3 to 4 weeks. She denies any injury.     Date of onset: May 2019    Injury: No    Prior Treatment:  No    Numbness/ Tingling: No    ROS: Review of Systems - General ROS: negative  Respiratory ROS: no cough, shortness of breath, or wheezing  Cardiovascular ROS: no chest pain or dyspnea on exertion  Musculoskeletal ROS: positive for - pain in thumb - left  Neurological ROS: negative  Dermatological ROS: negative    Past Medical History:   Diagnosis Date    Acute coronary syndrome ([address] 16    stent, right coronary artery, 99% occlusion    Allergic rhinitis     Asthma     CAD (coronary artery disease)     Cancer ([address]     cervical    Carpal tunnel syndrome     Dyslipidemia     mild    Headache(784.0)     migraine    Hearing loss in right ear     sudden right hearing loss associated with vertigo    Impingement syndrome of shoulder, bursitis, left shoulder 2010       Past Surgical History:   Procedure Laterality Date    ENDOSCOPY, COLON, DIAGNOSTIC      HX  SECTION      X2    HX CORONARY STENT PLACEMENT  16    3.0 x 18 mm Xience stent and 3.0 x 8 mm Xience stent    HX HEART CATHETERIZATION  16    99% RCA    HX HYSTERECTOMY      for cervical cancer; pelvic exams Marjan Barraza    HX ORTHOPAEDIC      left knee surgery arthroscopy    HX ORTHOPAEDIC      carpal tunnel left     HX PELVIC LAPAROSCOPY      X2    HX TONSILLECTOMY         Current Outpatient Medications   Medication Sig Dispense Refill    triamcinolone acetonide (KENALOG) 10 mg/mL injection 1 mL by Intra artICUlar route once for 1 dose. 1 Vial 0    metoprolol tartrate (LOPRESSOR) 25 mg tablet take 1/2 tablet by mouth every 12 hours 90 Tab 4    clopidogrel (PLAVIX) 75 mg tab take 1 tablet by mouth once daily 90 Tab 4    nitroglycerin (NITROSTAT) 0.4 mg SL tablet 1 Tab by SubLINGual route as needed for Chest Pain. 1 Bottle 3    atorvastatin (LIPITOR) 40 mg tablet take 1 tablet by mouth NIGHTLY (Patient taking differently: take half a tablet by mouth NIGHTLY) 90 Tab 3    SUMAtriptan (IMITREX) 100 mg tablet take 1 tablet by mouth ONCE AS NEEDED FOR MIGRAINE for UP to 1 dose 9 Tab 2    albuterol (PROVENTIL HFA, VENTOLIN HFA, PROAIR HFA) 90 mcg/actuation inhaler Take 1-2 Puffs by inhalation every six (6) hours as needed for Wheezing. 1 Inhaler 0    cyclobenzaprine (FLEXERIL) 10 mg tablet Take 0.5 Tabs by mouth three (3) times daily as needed for Muscle Spasm(s). 30 Tab 0    aspirin 81 mg chewable tablet Take 1 Tab by mouth daily. 30 Tab 1    multivitamin (ONE A DAY) tablet Take 1 Tab by mouth daily.  CALCIUM CARBONATE (CALTRATE 600 PO) Take 1 Tab by mouth two (2) times a day. No Known Allergies      PE:     Hand:    Examination L Digit(s) R Digit(s)   1st CMC Tenderness -  -    1st CMC Grind -  -    Debi Nodes -  -    [place]berden Nodes -  -    A1 Pulley Tenderness +  -    Triggering + Th -    UCL Instability -  -    RCL Instability -  -    Lateral Stress Pain -  -    Palmar Cords -  -    Tabletop test -  -    Garrod's Pads -  -     Strength       Pinch Strength         ROM: Full      Imaging: None indicated        ICD-10-CM ICD-9-CM    1.  Trigger thumb of left hand M65.312 727.03 TRIAMCINOLONE ACETONIDE INJ      triamcinolone acetonide (KENALOG) 10 mg/mL injection      INJECT TENDON ORIGIN/INSERT       Plan:     Left thumb A1 pulley injection    Follow-up PRN    Plan was reviewed with patient, who verbalized agreement and understanding of the plan    [address] NOTE        Chart reviewed for the following:   Osei MAYS DO, have reviewed the History, Physical and updated the Allergic reactions for [address] performed immediately prior to start of procedure:   Ankit MAYS DO, have performed the following reviews on Cris Higgins prior to the start of the procedure:            * Patient was identified by name and date of birth   * Agreement on procedure being performed was verified  * Risks and Benefits explained to the patient  * Procedure site verified and marked as necessary  * Patient was positioned for comfort  * Consent was signed and verified     Time: 08:30 AM      Date of procedure: 6/6/2019    Procedure performed by: Ankit Morgan DO    Provider assisted by: Mattie Ramirez LPN    Patient assisted by: self    How tolerated by patient: tolerated the procedure well with no complications    Post Procedural Pain Scale: 0 - No Hurt    Comments: none    Procedure:  After consent was obtained, using sterile technique the A1 pulley was prepped. Local anesthetic used: 1% lidocaine. Kenalog 5 mg and was then injected and the needle withdrawn. The procedure was well tolerated. The patient is asked to continue to rest the area for a few more days before resuming regular activities. It may be more painful for the first 1-2 days. Watch for fever, or increased swelling or persistent pain in the joint. Call or return to clinic prn if such symptoms occur or there is failure to improve as anticipated.

## 2019-06-25 RX ORDER — SUMATRIPTAN 100 MG/1
TABLET, FILM COATED ORAL
Qty: 9 TAB | Refills: 2 | Status: SHIPPED | OUTPATIENT
Start: 2019-06-25 | End: 2021-01-18 | Stop reason: SDUPTHER

## 2019-08-27 ENCOUNTER — CLINICAL SUPPORT (OUTPATIENT)
Dept: INTERNAL MEDICINE CLINIC | Age: 64
End: 2019-08-27

## 2019-08-27 DIAGNOSIS — Z23 NEED FOR TUBERCULOSIS VACCINATION: Primary | ICD-10-CM

## 2019-08-29 ENCOUNTER — CLINICAL SUPPORT (OUTPATIENT)
Dept: INTERNAL MEDICINE CLINIC | Age: 64
End: 2019-08-29

## 2019-08-29 DIAGNOSIS — Z11.1 SCREENING-PULMONARY TB: Primary | ICD-10-CM

## 2019-08-29 LAB
MM INDURATION POC: 0 MM (ref 0–5)
PPD POC: NEGATIVE NEGATIVE

## 2019-08-29 NOTE — PROGRESS NOTES
PPD Reading Note  [facility] Result: 0 mm induration.   Interpretation: negative

## 2019-09-18 ENCOUNTER — OFFICE VISIT (OUTPATIENT)
Dept: ORTHOPEDIC SURGERY | Facility: CLINIC | Age: 64
End: 2019-09-18

## 2019-09-18 VITALS
SYSTOLIC BLOOD PRESSURE: 107 MMHG | TEMPERATURE: 97.1 F | BODY MASS INDEX: 19.26 KG/M2 | OXYGEN SATURATION: 99 % | HEART RATE: 54 BPM | DIASTOLIC BLOOD PRESSURE: 45 MMHG | WEIGHT: 102 LBS | RESPIRATION RATE: 16 BRPM | HEIGHT: 61 IN

## 2019-09-18 DIAGNOSIS — M65.312 TRIGGER THUMB OF LEFT HAND: Primary | ICD-10-CM

## 2019-09-18 NOTE — PROGRESS NOTES
1. Have you been to the ER, urgent care clinic since your last visit? Hospitalized since your last visit? NO    2. Have you seen or consulted any other health care providers outside of the [address] [place] [address] since your last visit? Include any pap smears or colon screening.  NO

## 2019-09-18 NOTE — PROGRESS NOTES
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 Vitals:    19 0945   BP: 107/45   Pulse: (!) 54   Resp: 16   Temp: 97.1 °F (36.2 °C)   TempSrc: Oral   SpO2: 99%   Weight: 102 lb (46.3 kg)   Height: 5' 1\" (1.549 m)   PainSc:   6           Chief Complaint   Patient presents with    Thumb Pain     left thumb pain, f/u appt. HPI: Patient comes in today for follow-up regarding her left thumb trigger finger. Approximately 3 and half months ago she had an injection for which she said worked very well up until 2 weeks ago. She says she had complete pain relief up until a couple weeks ago. She would like another injection today. Initial HPI: Patient comes in today with complaints of left thumb locking and pain. She reports is been going on for 3 to 4 weeks. She denies any injury.     Date of onset: May 2019    Injury: No    Prior Treatment:  No    Numbness/ Tingling: No    ROS: Review of Systems - General ROS: negative  Respiratory ROS: no cough, shortness of breath, or wheezing  Cardiovascular ROS: no chest pain or dyspnea on exertion  Musculoskeletal ROS: positive for - pain in thumb - left  Neurological ROS: negative  Dermatological ROS: negative    Past Medical History:   Diagnosis Date    Acute coronary syndrome ([address] 16    stent, right coronary artery, 99% occlusion    Allergic rhinitis     Asthma     CAD (coronary artery disease)     Cancer ([address]     cervical    Carpal tunnel syndrome     Dyslipidemia     mild    Headache(784.0)     migraine    Hearing loss in right ear     sudden right hearing loss associated with vertigo    Impingement syndrome of shoulder, bursitis, left shoulder 2010       Past Surgical History:   Procedure Laterality Date    ENDOSCOPY, COLON, DIAGNOSTIC      HX  SECTION      X2    HX CORONARY STENT PLACEMENT  16    3.0 x 18 mm Xience stent and 3.0 x 8 mm Xience stent    HX HEART CATHETERIZATION  6/8/16    99% RCA    HX HYSTERECTOMY  1986    for cervical cancer; pelvic exams Marjan Barraza    HX ORTHOPAEDIC      left knee surgery arthroscopy    HX ORTHOPAEDIC      carpal tunnel left     HX PELVIC LAPAROSCOPY      X2    HX TONSILLECTOMY         Current Outpatient Medications   Medication Sig Dispense Refill    SUMAtriptan (IMITREX) 100 mg tablet take 1 tablet by mouth ONCE AS NEEDED FOR MIGRAINE FOR UP TO 1 DOSE 9 Tab 2    metoprolol tartrate (LOPRESSOR) 25 mg tablet take 1/2 tablet by mouth every 12 hours 90 Tab 4    clopidogrel (PLAVIX) 75 mg tab take 1 tablet by mouth once daily 90 Tab 4    nitroglycerin (NITROSTAT) 0.4 mg SL tablet 1 Tab by SubLINGual route as needed for Chest Pain. 1 Bottle 3    atorvastatin (LIPITOR) 40 mg tablet take 1 tablet by mouth NIGHTLY (Patient taking differently: take half a tablet by mouth NIGHTLY) 90 Tab 3    albuterol (PROVENTIL HFA, VENTOLIN HFA, PROAIR HFA) 90 mcg/actuation inhaler Take 1-2 Puffs by inhalation every six (6) hours as needed for Wheezing. 1 Inhaler 0    cyclobenzaprine (FLEXERIL) 10 mg tablet Take 0.5 Tabs by mouth three (3) times daily as needed for Muscle Spasm(s). 30 Tab 0    aspirin 81 mg chewable tablet Take 1 Tab by mouth daily. 30 Tab 1    multivitamin (ONE A DAY) tablet Take 1 Tab by mouth daily.  CALCIUM CARBONATE (CALTRATE 600 PO) Take 1 Tab by mouth two (2) times a day. No Known Allergies      PE:     Hand:    Examination L Digit(s) R Digit(s)   1st CMC Tenderness -  -    1st CMC Grind -  -    Debi Nodes -  -    Heberden Nodes -  -    A1 Pulley Tenderness + Th -    Triggering + Th -    UCL Instability -  -    RCL Instability -  -    Lateral Stress Pain -  -    Palmar Cords -  -    Tabletop test -  -    Garrod's Pads -  -     Strength       Pinch Strength         ROM: Full      Imaging: None indicated        ICD-10-CM ICD-9-CM    1.  Trigger thumb of left hand M65.312 727.03 Plan:     Left thumb A1 pulley injection    Follow-up PRN had a discussion with the patient regarding operative versus nonoperative treatment and I told her that steroid injections as well as her work letter for reasonable amount of time. However if the injections stop working we should consider surgery. Plan was reviewed with patient, who verbalized agreement and understanding of the plan    [address] NOTE        Chart reviewed for the following:   Osei MAYS DO, have reviewed the History, Physical and updated the Allergic reactions for [address] performed immediately prior to start of procedure:   Osei MAYS DO, have performed the following reviews on [place] [id number] prior to the start of the procedure:            * Patient was identified by name and date of birth   * Agreement on procedure being performed was verified  * Risks and Benefits explained to the patient  * Procedure site verified and marked as necessary  * Patient was positioned for comfort  * Consent was signed and verified     Time: 10:10. AM      Date of procedure: 9/18/2019    Procedure performed by: Gil Pulido DO    Provider [facility]    Patient assisted by: self    How tolerated by patient: tolerated the procedure well with no complications    Post Procedural Pain Scale: 0 - No Hurt    Comments: none    Procedure:  After consent was obtained, using sterile technique the A1 pulley was prepped. Local anesthetic used: 1% lidocaine. Kenalog 5 mg and was then injected and the needle withdrawn. The procedure was well tolerated. The patient is asked to continue to rest the area for a few more days before resuming regular activities. It may be more painful for the first 1-2 days. Watch for fever, or increased swelling or persistent pain in the joint.  Call or return to clinic prn if such symptoms occur or there is failure to improve as anticipated.

## 2019-09-30 ENCOUNTER — DOCUMENTATION ONLY (OUTPATIENT)
Dept: INTERNAL MEDICINE CLINIC | Age: 64
End: 2019-09-30

## 2019-09-30 RX ORDER — CYCLOBENZAPRINE HCL 10 MG
TABLET ORAL
Qty: 30 TAB | Refills: 0 | Status: SHIPPED | OUTPATIENT
Start: 2019-09-30 | End: 2021-06-23 | Stop reason: SDUPTHER

## 2019-09-30 RX ORDER — ALBUTEROL SULFATE 90 UG/1
AEROSOL, METERED RESPIRATORY (INHALATION)
Qty: 18 G | Refills: 0 | Status: SHIPPED | OUTPATIENT
Start: 2019-09-30 | End: 2020-03-12 | Stop reason: SDUPTHER

## 2019-11-05 RX ORDER — ATORVASTATIN CALCIUM 40 MG/1
TABLET, FILM COATED ORAL
Qty: 90 TAB | Refills: 3 | Status: SHIPPED | OUTPATIENT
Start: 2019-11-05 | End: 2020-11-24

## 2019-11-06 ENCOUNTER — OFFICE VISIT (OUTPATIENT)
Dept: CARDIOLOGY CLINIC | Age: 64
End: 2019-11-06

## 2019-11-06 VITALS
DIASTOLIC BLOOD PRESSURE: 54 MMHG | WEIGHT: 104 LBS | SYSTOLIC BLOOD PRESSURE: 102 MMHG | HEIGHT: 61 IN | HEART RATE: 55 BPM | BODY MASS INDEX: 19.63 KG/M2 | OXYGEN SATURATION: 97 %

## 2019-11-06 DIAGNOSIS — Z95.5 HISTORY OF CORONARY ARTERY STENT PLACEMENT: ICD-10-CM

## 2019-11-06 DIAGNOSIS — E78.5 DYSLIPIDEMIA: ICD-10-CM

## 2019-11-06 DIAGNOSIS — I25.10 CORONARY ARTERY DISEASE INVOLVING NATIVE CORONARY ARTERY OF NATIVE HEART WITHOUT ANGINA PECTORIS: Primary | ICD-10-CM

## 2019-11-06 RX ORDER — NITROGLYCERIN 0.4 MG/1
0.4 TABLET SUBLINGUAL AS NEEDED
Qty: 1 BOTTLE | Refills: 3 | Status: SHIPPED | OUTPATIENT
Start: 2019-11-06 | End: 2021-06-09 | Stop reason: SDUPTHER

## 2019-11-06 NOTE — PATIENT INSTRUCTIONS
A  will call you to schedule your cardiac testing. If you do not receive a phone call within 48 hours or miss it you may call; Central Scheduling [phone]

## 2019-11-06 NOTE — PROGRESS NOTES
HPI:  I saw Kameron Trejo in my office today in cardiovascular evaluation regarding her chronic coronary artery disease. Ms. Doug Trejo is a very pleasant 70 year old small  female, who began having some exertional chest tightness on 6/4/16, which progressed to more discomfort with less exertion and prompting a hospitalization on 6/6/16. Argenis Winter had only a very minimal increase in her biomarkers, but her history prompted a pharmacologic myocardial perfusion study, which demonstrated some inferior wall signs of ischemia and ultimately a cardiac catheterization by my associate Dr. Catalino Butler and findings with the following anatomy:      1. Patent left main trunk. 2.  30% mid LAD obstruction. 3.  Very small angiographically normal ramus intermediate range. 4.  Diagonal branches were angiographically normal.  5.  20% smooth mid circumflex obstruction with patent obtuse marginal branches. 6.  99% stenosis at the junction of the mid and distal third of the vessel of TIMI2 flow.      The patient subsequently had stenting with 3.0 x 18 mm and 3.0 x 8 mm Xience stents with improvement of TIMI2 flow to TIMI3 flow and 0 residual.     She comes in today relates that she is continued to do quite well. She is remaining very active and denies any cardiovascular issues at this time. Encounter Diagnoses   Name Primary?  Coronary artery disease involving native coronary artery of native heart without angina pectoris Yes    History of coronary artery stent placement     Dyslipidemia        Discussion: This lady appears to be doing about as well as we could expect and I have no recommendations for change in her current medical regimen. However, despite the fact that she is asymptomatic it has been over 3 years since she had her stenting procedure and I would like to do a screening nuclear myocardial perfusion study to rule out silent ischemia which I am going to get ordered.     Her latest lipid profile which was completed on 2018 and is to be repeated in the near future was quite good with total cholesterol 153, triglycerides of 64, HDL 79, LDL of 61.2, and VLDL of 12.8 on Lipitor 40 mg daily. Her blood pressure is very well controlled today and her EKG is stable and since she is otherwise doing well I will simply plan to see her again in a year. PCP: Laith Velez MD      Past Medical History:   Diagnosis Date    Acute coronary syndrome ([facility] 16    stent, right coronary artery, 99% occlusion    Allergic rhinitis     Asthma     CAD (coronary artery disease)     Cancer ([facility]     cervical    Carpal tunnel syndrome     Dyslipidemia     mild    Headache(784.0)     migraine    Hearing loss in right ear     sudden right hearing loss associated with vertigo    Impingement syndrome of shoulder, bursitis, left shoulder 2010       Past Surgical History:   Procedure Laterality Date    ENDOSCOPY, COLON, DIAGNOSTIC      HX  SECTION      X2    HX CORONARY STENT PLACEMENT  16    3.0 x 18 mm Xience stent and 3.0 x 8 mm Xience stent    HX HEART CATHETERIZATION  16    99% RCA    HX HYSTERECTOMY      for cervical cancer; pelvic exams Leonetta High    HX ORTHOPAEDIC      left knee surgery arthroscopy    HX ORTHOPAEDIC      carpal tunnel left     HX PELVIC LAPAROSCOPY      X2    HX TONSILLECTOMY       Current Outpatient Medications   Medication Sig    nitroglycerin (NITROSTAT) 0.4 mg SL tablet 1 Tab by SubLINGual route as needed for Chest Pain.     atorvastatin (LIPITOR) 40 mg tablet take 1 tablet by mouth NIGHTLY    cyclobenzaprine (FLEXERIL) 10 mg tablet take 1/2 tablet by mouth three times a day if needed for muscle spasm    VENTOLIN HFA 90 mcg/actuation inhaler inhale 1 to 2 puffs by mouth every 6 hours if needed for wheezing    SUMAtriptan (IMITREX) 100 mg tablet take 1 tablet by mouth ONCE AS NEEDED FOR MIGRAINE FOR UP TO 1 DOSE    metoprolol tartrate (LOPRESSOR) 25 mg tablet take 1/2 tablet by mouth every 12 hours    clopidogrel (PLAVIX) 75 mg tab take 1 tablet by mouth once daily    aspirin 81 mg chewable tablet Take 1 Tab by mouth daily.  multivitamin (ONE A DAY) tablet Take 1 Tab by mouth daily.  CALCIUM CARBONATE (CALTRATE 600 PO) Take 1 Tab by mouth two (2) times a day. No current facility-administered medications for this visit. No Known Allergies    Social History:   Social History     Tobacco Use    Smoking status: Never Smoker    Smokeless tobacco: Never Used   Substance Use Topics    Alcohol use: Yes     Comment: occassionally         Family history: family history includes Cancer in her father, mother, and another family member; Heart Disease in her father. Review of Systems:     Constitutional: Negative for chills, fever, malaise/fatigue and weight loss. Respiratory: Negative for cough, hemoptysis, shortness of breath and wheezing. Cardiovascular: Negative for chest pain, palpitations, orthopnea and leg swelling. Gastrointestinal: Negative. Musculoskeletal: Negative for falls, joint pain and myalgias. Neurological: Negative for dizziness. Physical Exam:   The patient is an alert, oriented, well developed, well nourished 75 y.o.  female who was in no acute distress at the time of my examination. Visit Vitals  /54 (BP 1 Location: Left arm, BP Patient Position: Sitting)   Pulse (!) 55   Ht 5' 1\" (1.549 m)   Wt 47.2 kg (104 lb)   SpO2 97%   BMI 19.65 kg/m²      BP Readings from Last 3 Encounters:   11/06/19 102/54   09/18/19 107/45   06/06/19 109/53      Wt Readings from Last 3 Encounters:   11/06/19 47.2 kg (104 lb)   09/18/19 46.3 kg (102 lb)   06/06/19 45.4 kg (100 lb)     HEENT: Conjuctiva white, mucosa moist, no pallor or cyanosis. NECK: Supple without masses, tenderness or thyromegaly. There was no jugular venous distention. Carotid are full bilaterally without bruits.   CHEST: Symmetrical with good excursion. LUNGS: Clear to auscultation in all fields. HEART: Regular rate and rhythm. The apex is not displaced. There were no lifts, thrills or heaves. There is a normal S1 and S2 without appreciable murmurs, rubs, clicks, or gallops auscultated. ABDOMEN: Soft without masses, tenderness or organomegaly. EXTREMITIES: Full peripheral pulses without peripheral edema. INTEGUMENT: Skin is warm and dry   NEUROLOGICAL: The patient was oriented x3 with motor function grossly intact. Review of Data: See PMH and Cardiology and Imaging sections for cardiac testing  Lab Results   Component Value Date/Time    Cholesterol, total 153 11/28/2018 07:29 AM    HDL Cholesterol 79 (H) 11/28/2018 07:29 AM    LDL, calculated 61.2 11/28/2018 07:29 AM    Triglyceride 64 11/28/2018 07:29 AM    CHOL/HDL Ratio 1.9 11/28/2018 07:29 AM       Results for orders placed or performed in visit on 11/06/19   AMB POC EKG ROUTINE W/ 12 LEADS, INTER & REP     Status: None    Narrative    Sinus bradycardia rate 55 with sinus arrhythmia. His EKG is otherwise within normal limits and similar to the EKG of November 6, 2018           Santos Tucker D.O., F.A.C.C. [facility]  [facility] [place]  [place] [address]. [address] [facility]    [phone]    PLEASE NOTE:  This document has been produced using voice recognition software. Unrecognized errors in transcription may be present.

## 2019-11-21 ENCOUNTER — OFFICE VISIT (OUTPATIENT)
Dept: ORTHOPEDIC SURGERY | Age: 64
End: 2019-11-21

## 2019-11-21 VITALS
RESPIRATION RATE: 14 BRPM | HEIGHT: 61 IN | BODY MASS INDEX: 19.63 KG/M2 | TEMPERATURE: 97 F | HEART RATE: 45 BPM | WEIGHT: 104 LBS | SYSTOLIC BLOOD PRESSURE: 95 MMHG | OXYGEN SATURATION: 100 % | DIASTOLIC BLOOD PRESSURE: 45 MMHG

## 2019-11-21 DIAGNOSIS — M65.312 TRIGGER THUMB OF LEFT HAND: Primary | ICD-10-CM

## 2019-11-21 NOTE — PROGRESS NOTES
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 Worker's Compensation and legal considerations: not known. Vitals:    11/21/19 0920   BP: 95/45   Pulse: (!) 45   Resp: 14   Temp: 97 °F (36.1 °C)   TempSrc: Oral   SpO2: 100%   Weight: 104 lb (47.2 kg)   Height: 5' 1\" (1.549 m)   PainSc:   3   PainLoc: Finger           Chief Complaint   Patient presents with    Thumb Pain     LEFT THUMB PAIN       HPI: Patient comes in today with complaints of recurrent left thumb triggering and pain. She is previously had 2 injections for this in the 80s last about 3 months. Previous HPI: Patient comes in today for follow-up regarding her left thumb trigger finger. Approximately 3 and half months ago she had an injection for which she said worked very well up until 2 weeks ago. She says she had complete pain relief up until a couple weeks ago. She would like another injection today. Initial HPI: Patient comes in today with complaints of left thumb locking and pain. She reports is been going on for 3 to 4 weeks. She denies any injury.     Date of onset: May 2019    Injury: No    Prior Treatment:  No    Numbness/ Tingling: No    ROS: Review of Systems - General ROS: negative  Respiratory ROS: no cough, shortness of breath, or wheezing  Cardiovascular ROS: no chest pain or dyspnea on exertion  Musculoskeletal ROS: positive for - pain in thumb - left  Neurological ROS: negative  Dermatological ROS: negative    Past Medical History:   Diagnosis Date    Acute coronary syndrome ([address] 6/6/16    stent, right coronary artery, 99% occlusion    Allergic rhinitis     Asthma     CAD (coronary artery disease)     Cancer ([facility] 1986    cervical    Carpal tunnel syndrome     Dyslipidemia     mild    Headache(784.0)     migraine    Hearing loss in right ear 2011    sudden right hearing loss associated with vertigo    Impingement syndrome of shoulder, bursitis, left shoulder 8/12/2010       Past Surgical History: Procedure Laterality Date    ENDOSCOPY, COLON, DIAGNOSTIC      HX  SECTION      X2    HX CORONARY STENT PLACEMENT  16    3.0 x 18 mm Xience stent and 3.0 x 8 mm Xience stent    HX HEART CATHETERIZATION  16    99% RCA    HX HYSTERECTOMY      for cervical cancer; pelvic exams Marjan Barraza    HX ORTHOPAEDIC      left knee surgery arthroscopy    HX ORTHOPAEDIC      carpal tunnel left     HX PELVIC LAPAROSCOPY      X2    HX TONSILLECTOMY         Current Outpatient Medications   Medication Sig Dispense Refill    triamcinolone acetonide (KENALOG) 10 mg/mL injection 1 mL by IntraMUSCular route once for 1 dose. 1 Vial 0    nitroglycerin (NITROSTAT) 0.4 mg SL tablet 1 Tab by SubLINGual route as needed for Chest Pain. 1 Bottle 3    atorvastatin (LIPITOR) 40 mg tablet take 1 tablet by mouth NIGHTLY 90 Tab 3    cyclobenzaprine (FLEXERIL) 10 mg tablet take 1/2 tablet by mouth three times a day if needed for muscle spasm 30 Tab 0    VENTOLIN HFA 90 mcg/actuation inhaler inhale 1 to 2 puffs by mouth every 6 hours if needed for wheezing 18 g 0    SUMAtriptan (IMITREX) 100 mg tablet take 1 tablet by mouth ONCE AS NEEDED FOR MIGRAINE FOR UP TO 1 DOSE 9 Tab 2    metoprolol tartrate (LOPRESSOR) 25 mg tablet take 1/2 tablet by mouth every 12 hours 90 Tab 4    clopidogrel (PLAVIX) 75 mg tab take 1 tablet by mouth once daily 90 Tab 4    aspirin 81 mg chewable tablet Take 1 Tab by mouth daily. 30 Tab 1    multivitamin (ONE A DAY) tablet Take 1 Tab by mouth daily.  CALCIUM CARBONATE (CALTRATE 600 PO) Take 1 Tab by mouth two (2) times a day.          No Known Allergies      PE:     Hand:    Examination L Digit(s) R Digit(s)   1st CMC Tenderness -  -    1st CMC Grind -  -    Debi Nodes -  -    Heberden Nodes -  -    A1 Pulley Tenderness + Th -    Triggering + Th -    UCL Instability -  -    RCL Instability -  -    Lateral Stress Pain -  -    Palmar Cords -  -    Tabletop test -  - Garrod's Pads -  -     Strength       Pinch Strength         ROM: Full      Imaging: None indicated        ICD-10-CM ICD-9-CM    1. Trigger thumb of left hand M65.312 727.03 triamcinolone acetonide (KENALOG) 10 mg/mL injection      INJECT TENDON ORIGIN/INSERT      TRIAMCINOLONE ACETONIDE INJ       Plan:     Left thumb A1 pulley injection    Follow-up PRN. Based on how long this injection last we will discuss surgery or repeat injection. This injection was her third injection. Plan was reviewed with patient, who verbalized agreement and understanding of the plan    [address] NOTE        Chart reviewed for the following:   Osei MAYS DO, have reviewed the History, Physical and updated the Allergic reactions for [address] performed immediately prior to start of procedure:   Osei MAYS DO, have performed the following reviews on [place] [id number] prior to the start of the procedure:            * Patient was identified by name and date of birth   * Agreement on procedure being performed was verified  * Risks and Benefits explained to the patient  * Procedure site verified and marked as necessary  * Patient was positioned for comfort  * Consent was signed and verified     Time: 10:10. AM      Date of procedure: 11/21/2019    Procedure performed by: Bonnie Ha DO    Provider assisted by: Daksha Pierre LPN    Patient assisted by: self    How tolerated by patient: tolerated the procedure well with no complications    Post Procedural Pain Scale: 0 - No Hurt    Comments: none    Procedure:  After consent was obtained, using sterile technique the A1 pulley was prepped. Local anesthetic used: 1% lidocaine. Kenalog 5 mg and was then injected and the needle withdrawn. The procedure was well tolerated. The patient is asked to continue to rest the area for a few more days before resuming regular activities.   It may be more painful for the first 1-2 days. Watch for fever, or increased swelling or persistent pain in the joint. Call or return to clinic prn if such symptoms occur or there is failure to improve as anticipated.

## 2019-11-21 NOTE — PROGRESS NOTES
1. Have you been to the ER, urgent care clinic since your last visit? Hospitalized since your last visit? No    2. Have you seen or consulted any other health care providers outside of the [address] [place] [address] since your last visit? Include any pap smears or colon screening.  No\

## 2019-12-04 ENCOUNTER — HOSPITAL ENCOUNTER (OUTPATIENT)
Dept: LAB | Age: 64
Discharge: HOME | End: 2019-12-04
Payer: MEDICARE

## 2019-12-04 DIAGNOSIS — I25.10 CORONARY ARTERY DISEASE INVOLVING NATIVE CORONARY ARTERY OF NATIVE HEART WITHOUT ANGINA PECTORIS: ICD-10-CM

## 2019-12-04 DIAGNOSIS — E78.5 HYPERLIPIDEMIA LDL GOAL <100: ICD-10-CM

## 2019-12-04 DIAGNOSIS — Z86.69 HISTORY OF MIGRAINE HEADACHES: ICD-10-CM

## 2019-12-04 LAB
ALBUMIN SERPL-MCNC: 3.9 G/DL (ref 3.4–5)
ALBUMIN/GLOB SERPL: 1.5 {RATIO} (ref 0.8–1.7)
ALP SERPL-CCNC: 42 U/L (ref 45–117)
ALT SERPL-CCNC: 31 U/L (ref 13–56)
ANION GAP SERPL CALC-SCNC: 4 MMOL/L (ref 3–18)
AST SERPL-CCNC: 23 U/L (ref 10–38)
BASOPHILS # BLD: 0 K/UL (ref 0–0.1)
BASOPHILS NFR BLD: 1 % (ref 0–2)
BILIRUB SERPL-MCNC: 0.3 MG/DL (ref 0.2–1)
BUN SERPL-MCNC: 12 MG/DL (ref 7–18)
BUN/CREAT SERPL: 15 (ref 12–20)
CALCIUM SERPL-MCNC: 9.1 MG/DL (ref 8.5–10.1)
CHLORIDE SERPL-SCNC: 103 MMOL/L (ref 100–111)
CHOLEST SERPL-MCNC: 162 MG/DL
CO2 SERPL-SCNC: 31 MMOL/L (ref 21–32)
CREAT SERPL-MCNC: 0.81 MG/DL (ref 0.6–1.3)
DIFFERENTIAL METHOD BLD: NORMAL
EOSINOPHIL # BLD: 0.3 K/UL (ref 0–0.4)
EOSINOPHIL NFR BLD: 5 % (ref 0–5)
ERYTHROCYTE [DISTWIDTH] IN BLOOD BY AUTOMATED COUNT: 13.5 % (ref 11.6–14.5)
GLOBULIN SER CALC-MCNC: 2.6 G/DL (ref 2–4)
GLUCOSE SERPL-MCNC: 87 MG/DL (ref 74–99)
HCT VFR BLD AUTO: 42.9 % (ref 35–45)
HDLC SERPL-MCNC: 75 MG/DL (ref 40–60)
HDLC SERPL: 2.2 {RATIO} (ref 0–5)
HGB BLD-MCNC: 13.9 G/DL (ref 12–16)
LDLC SERPL CALC-MCNC: 73 MG/DL (ref 0–100)
LIPID PROFILE,FLP: ABNORMAL
LYMPHOCYTES # BLD: 1.8 K/UL (ref 0.9–3.6)
LYMPHOCYTES NFR BLD: 32 % (ref 21–52)
MCH RBC QN AUTO: 29.4 PG (ref 24–34)
MCHC RBC AUTO-ENTMCNC: 32.4 G/DL (ref 31–37)
MCV RBC AUTO: 90.7 FL (ref 74–97)
MONOCYTES # BLD: 0.6 K/UL (ref 0.05–1.2)
MONOCYTES NFR BLD: 10 % (ref 3–10)
NEUTS SEG # BLD: 2.9 K/UL (ref 1.8–8)
NEUTS SEG NFR BLD: 52 % (ref 40–73)
PLATELET # BLD AUTO: 256 K/UL (ref 135–420)
PMV BLD AUTO: 10.6 FL (ref 9.2–11.8)
POTASSIUM SERPL-SCNC: 4.5 MMOL/L (ref 3.5–5.5)
PROT SERPL-MCNC: 6.5 G/DL (ref 6.4–8.2)
RBC # BLD AUTO: 4.73 M/UL (ref 4.2–5.3)
SODIUM SERPL-SCNC: 138 MMOL/L (ref 136–145)
T4 FREE SERPL-MCNC: 0.9 NG/DL (ref 0.7–1.5)
TRIGL SERPL-MCNC: 70 MG/DL (ref ?–150)
TSH SERPL DL<=0.05 MIU/L-ACNC: 1.29 UIU/ML (ref 0.36–3.74)
VLDLC SERPL CALC-MCNC: 14 MG/DL
WBC # BLD AUTO: 5.5 K/UL (ref 4.6–13.2)

## 2019-12-04 PROCEDURE — 84443 ASSAY THYROID STIM HORMONE: CPT

## 2019-12-04 PROCEDURE — 84439 ASSAY OF FREE THYROXINE: CPT

## 2019-12-04 PROCEDURE — 36415 COLL VENOUS BLD VENIPUNCTURE: CPT

## 2019-12-04 PROCEDURE — 80061 LIPID PANEL: CPT

## 2019-12-04 PROCEDURE — 80053 COMPREHEN METABOLIC PANEL: CPT

## 2019-12-04 PROCEDURE — 85025 COMPLETE CBC W/AUTO DIFF WBC: CPT

## 2019-12-11 ENCOUNTER — OFFICE VISIT (OUTPATIENT)
Dept: INTERNAL MEDICINE CLINIC | Age: 64
End: 2019-12-11

## 2019-12-11 VITALS
SYSTOLIC BLOOD PRESSURE: 118 MMHG | RESPIRATION RATE: 12 BRPM | BODY MASS INDEX: 19.48 KG/M2 | WEIGHT: 103.2 LBS | TEMPERATURE: 97.7 F | DIASTOLIC BLOOD PRESSURE: 76 MMHG | OXYGEN SATURATION: 98 % | HEIGHT: 61 IN | HEART RATE: 48 BPM

## 2019-12-11 DIAGNOSIS — I25.10 CORONARY ARTERY DISEASE INVOLVING NATIVE CORONARY ARTERY OF NATIVE HEART WITHOUT ANGINA PECTORIS: Primary | ICD-10-CM

## 2019-12-11 DIAGNOSIS — E78.5 HYPERLIPIDEMIA LDL GOAL <100: ICD-10-CM

## 2019-12-11 DIAGNOSIS — Z86.69 HISTORY OF MIGRAINE HEADACHES: ICD-10-CM

## 2019-12-11 DIAGNOSIS — Z23 ENCOUNTER FOR IMMUNIZATION: ICD-10-CM

## 2019-12-11 NOTE — PATIENT INSTRUCTIONS
Vaccine Information Statement Influenza (Flu) Vaccine (Inactivated or Recombinant): What You Need to Know Many Vaccine Information Statements are available in [place] and other languages. See www.immunize.org/vis Hojas de información sobre vacunas están disponibles en español y en muchos otros idiomas. Visite www.immunize.org/vis 1. Why get vaccinated? Influenza vaccine can prevent influenza (flu). Flu is a contagious disease that spreads around the [place] [facility] every year, usually between October and May. Anyone can get the flu, but it is more dangerous for some people. Infants and young children, people 72years of age and older, pregnant women, and people with certain health conditions or a weakened immune system are at greatest risk of flu complications. Pneumonia, bronchitis, sinus infections and ear infections are examples of flu-related complications. If you have a medical condition, such as heart disease, cancer or diabetes, flu can make it worse. Flu can cause fever and chills, sore throat, muscle aches, fatigue, cough, headache, and runny or stuffy nose. Some people may have vomiting and diarrhea, though this is more common in children than adults. Each year thousands of people in the [facility] die from flu, and many more are hospitalized. Flu vaccine prevents millions of illnesses and flu-related visits to the doctor each year. 2. Influenza vaccines CDC recommends everyone 10months of age and older get vaccinated every flu season. Children 6 months through 6years of age may need 2 doses during a single flu season. Everyone else needs only 1 dose each flu season. It takes about 2 weeks for protection to develop after vaccination. There are many flu viruses, and they are always changing. Each year a new flu vaccine is made to protect against three or four viruses that are likely to cause disease in the upcoming flu season.  Even when the vaccine doesnt exactly match these viruses, it may still provide some protection. Influenza vaccine does not cause flu. Influenza vaccine may be given at the same time as other vaccines. 3. Talk with your health care provider Tell your vaccine provider if the person getting the vaccine: 
 Has had an allergic reaction after a previous dose of influenza vaccine, or has any severe, life-threatening allergies.  Has ever had Guillain-Barré Syndrome (also called GBS). In some cases, your health care provider may decide to postpone influenza vaccination to a future visit. People with minor illnesses, such as a cold, may be vaccinated. People who are moderately or severely ill should usually wait until they recover before getting influenza vaccine. Your health care provider can give you more information. 4. Risks of a reaction  Soreness, redness, and swelling where shot is given, fever, muscle aches, and headache can happen after influenza vaccine.  There may be a very small increased risk of Guillain-Barré Syndrome (GBS) after inactivated influenza vaccine (the flu shot). [facility] children who get the flu shot along with pneumococcal vaccine (PCV13), and/or DTaP vaccine at the same time might be slightly more likely to have a seizure caused by fever. Tell your health care provider if a child who is getting flu vaccine has ever had a seizure. People sometimes faint after medical procedures, including vaccination. Tell your provider if you feel dizzy or have vision changes or ringing in the ears. As with any medicine, there is a very remote chance of a vaccine causing a severe allergic reaction, other serious injury, or death. 5. What if there is a serious problem? An allergic reaction could occur after the vaccinated person leaves the clinic.  If you see signs of a severe allergic reaction (hives, swelling of the face and throat, difficulty breathing, a fast heartbeat, dizziness, or weakness), call 9-1-1 and get the person to the nearest hospital. 
 
For other signs that concern you, call your health care provider. Adverse reactions should be reported to the Vaccine Adverse Event Reporting System (VAERS). Your health care provider will usually file this report, or you can do it yourself. Visit the VAERS website at www.vaers. hhs.gov or call 9-773.251.3922. VAERS is only for reporting reactions, and VAERS staff do not give medical advice. 6. The National Vaccine Injury Compensation Program 
 
The [facility] Vaccine Injury Compensation Program (VICP) is a federal program that was created to compensate people who may have been injured by certain vaccines. Visit the VICP website at www.hrsa.gov/vaccinecompensation or call 9-680.479.4047 to learn about the program and about filing a claim. There is a time limit to file a claim for compensation. 7. How can I learn more?  Ask your health care provider.  Call your local or state health department.  Contact the Centers for Disease Control and Prevention (CDC): 
- Call 2-725.167.3566 (1-800-CDC-INFO) or 
- Visit CDCs influenza website at www.cdc.gov/flu Vaccine Information Statement (Interim) Inactivated Influenza Vaccine 8/15/2019 
42 [employer] [id number] Department of [facility] and [employer] Centers for Disease Control and Prevention Office Use Only

## 2019-12-11 NOTE — PROGRESS NOTES
Fay Nunez 1955, is a 75 y.o. female, who is seen today for reevaluation of atherosclerotic heart disease hyperlipidemia migraine headaches. Her headaches have been a lot better in recent years and she uses sumatriptan successfully when needed. She has ASHD but after acute coronary syndrome with stenting she is done well with no further chest pain. She has hyperlipidemia and takes her medicine regularly and is following her diet very closely. She lost quite a bit of weight even though she was then to start with that over the last year has regained 5 of those pounds. 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 Past Medical History:   Diagnosis Date    Acute coronary syndrome ([facility] [address] 16    stent, right coronary artery, 99% occlusion    Allergic rhinitis     Asthma     CAD (coronary artery disease)     Cancer ([facility] [address]     cervical    Carpal tunnel syndrome     Dyslipidemia     mild    Headache(784.0)     migraine    Hearing loss in right ear     sudden right hearing loss associated with vertigo    Impingement syndrome of shoulder, bursitis, left shoulder 2010     Past Surgical History:   Procedure Laterality Date    ENDOSCOPY, COLON, DIAGNOSTIC      HX  SECTION      X2    HX CORONARY STENT PLACEMENT  16    3.0 x 18 mm Xience stent and 3.0 x 8 mm Xience stent    HX HEART CATHETERIZATION  16    99% RCA    HX HYSTERECTOMY      for cervical cancer; pelvic exams Adrien Rebeca    HX ORTHOPAEDIC      left knee surgery arthroscopy    HX ORTHOPAEDIC      carpal tunnel left     HX PELVIC LAPAROSCOPY      X2    HX TONSILLECTOMY       Current Outpatient Medications   Medication Sig Dispense Refill    nitroglycerin (NITROSTAT) 0.4 mg SL tablet 1 Tab by SubLINGual route as needed for Chest Pain.  1 Bottle 3    atorvastatin (LIPITOR) 40 mg tablet take 1 tablet by mouth NIGHTLY 90 Tab 3    cyclobenzaprine (FLEXERIL) 10 mg tablet take 1/2 tablet by mouth three times a day if needed for muscle spasm 30 Tab 0    VENTOLIN HFA 90 mcg/actuation inhaler inhale 1 to 2 puffs by mouth every 6 hours if needed for wheezing 18 g 0    SUMAtriptan (IMITREX) 100 mg tablet take 1 tablet by mouth ONCE AS NEEDED FOR MIGRAINE FOR UP TO 1 DOSE 9 Tab 2    metoprolol tartrate (LOPRESSOR) 25 mg tablet take 1/2 tablet by mouth every 12 hours 90 Tab 4    clopidogrel (PLAVIX) 75 mg tab take 1 tablet by mouth once daily 90 Tab 4    aspirin 81 mg chewable tablet Take 1 Tab by mouth daily. 30 Tab 1    multivitamin (ONE A DAY) tablet Take 1 Tab by mouth daily.  CALCIUM CARBONATE (CALTRATE 600 PO) Take 1 Tab by mouth two (2) times a day. No Known Allergies  Social History     Socioeconomic History    Marital status:      Spouse name: Not on file    Number of children: Not on file    Years of education: Not on file    Highest education level: Not on file   Tobacco Use    Smoking status: Never Smoker    Smokeless tobacco: Never Used   Substance and Sexual Activity    Alcohol use: Yes     Comment: occassionally    Drug use: No     Visit Vitals  /76 (BP 1 Location: Left arm, BP Patient Position: Sitting)   Pulse (!) 48   Temp 97.7 °F (36.5 °C) (Oral)   Resp 12   Ht 5' 1\" (1.549 m)   Wt 103 lb 3.2 oz (46.8 kg)   SpO2 98%   BMI 19.50 kg/m²     The patient is a well-developed well-nourished female in no apparent distress. HEENT: Pupils are equal and react to light and extraocular movements are full. Ear canals and tympanic membranes appear normal. Oral cavity appears normal with no oral lesions. Neck: Carotids are 2+ without bruits. No adenopathy or thyromegaly. Lungs are clear to percussion. I hear no wheezing, rales or rhonchi. Heart reveals a regular rhythm with no murmur, gallop, click or rub. The apical impulse is in the fifth interspace at the midclavicular line.  Abdomen is soft and nontender with no hepatosplenomegaly or masses. Bowel sounds are normoactive and there is no distention or tympany. Extremities reveal no clubbing cyanosis or edema. Pulses are 2+. Skin reveals no suspicious skin growths. Breasts reveal no masses, skin or nipple abnormalities. No axillary adenopathy. Results for orders placed or performed during the hospital encounter of 12/04/19   CBC WITH AUTOMATED DIFF   Result Value Ref Range    WBC 5.5 4.6 - 13.2 K/uL    RBC 4.73 4.20 - 5.30 M/uL    HGB 13.9 12.0 - 16.0 g/dL    HCT 42.9 35.0 - 45.0 %    MCV 90.7 74.0 - 97.0 FL    MCH 29.4 24.0 - 34.0 PG    MCHC 32.4 31.0 - 37.0 g/dL    RDW 13.5 11.6 - 14.5 %    PLATELET 491 120 - 709 K/uL    MPV 10.6 9.2 - 11.8 FL    NEUTROPHILS 52 40 - 73 %    LYMPHOCYTES 32 21 - 52 %    MONOCYTES 10 3 - 10 %    EOSINOPHILS 5 0 - 5 %    BASOPHILS 1 0 - 2 %    ABS. NEUTROPHILS 2.9 1.8 - 8.0 K/UL    ABS. LYMPHOCYTES 1.8 0.9 - 3.6 K/UL    ABS. MONOCYTES 0.6 0.05 - 1.2 K/UL    ABS. EOSINOPHILS 0.3 0.0 - 0.4 K/UL    ABS. BASOPHILS 0.0 0.0 - 0.1 K/UL    DF AUTOMATED     LIPID PANEL   Result Value Ref Range    LIPID PROFILE          Cholesterol, total 162 <200 MG/DL    Triglyceride 70 <150 MG/DL    HDL Cholesterol 75 (H) 40 - 60 MG/DL    LDL, calculated 73 0 - 100 MG/DL    VLDL, calculated 14 MG/DL    CHOL/HDL Ratio 2.2 0 - 5.0     METABOLIC PANEL, COMPREHENSIVE   Result Value Ref Range    Sodium 138 136 - 145 mmol/L    Potassium 4.5 3.5 - 5.5 mmol/L    Chloride 103 100 - 111 mmol/L    CO2 31 21 - 32 mmol/L    Anion gap 4 3.0 - 18 mmol/L    Glucose 87 74 - 99 mg/dL    BUN 12 7.0 - 18 MG/DL    Creatinine 0.81 0.6 - 1.3 MG/DL    BUN/Creatinine ratio 15 12 - 20      GFR est AA >60 >60 ml/min/1.73m2    GFR est non-AA >60 >60 ml/min/1.73m2    Calcium 9.1 8.5 - 10.1 MG/DL    Bilirubin, total 0.3 0.2 - 1.0 MG/DL    ALT (SGPT) 31 13 - 56 U/L    AST (SGOT) 23 10 - 38 U/L    Alk.  phosphatase 42 (L) 45 - 117 U/L    Protein, total 6.5 6.4 - 8.2 g/dL    Albumin 3.9 3.4 - 5.0 g/dL    Globulin 2.6 2.0 - 4.0 g/dL    A-G Ratio 1.5 0.8 - 1.7     T4, FREE   Result Value Ref Range    T4, Free 0.9 0.7 - 1.5 NG/DL   TSH 3RD GENERATION   Result Value Ref Range    TSH 1.29 0.36 - 3.74 uIU/mL     Assessment: #1. ASHD asymptomatic, she will continue aspirin and Plavix. She will continue metoprolol 12.5 mg twice a day. #2. Hyperlipidemia not quite as well controlled but still doing very well, she will continue atorvastatin 40 mg daily and avoid significant further weight gain. #3.  History of migraine headaches doing very well, she will continue sumatriptan 100 mg when needed. She will receive pneumococcal vaccination and flu vaccination now and plan follow-up for a physical in about 1 year, I have asked her to call the office in June or July to see who we can recommend for her since I will be retired by then. Amrik Jensen MD FACP    Please note: This document has been produced using voice recognition software. Unrecognized errors in transcription may be present.

## 2020-03-13 RX ORDER — ALBUTEROL SULFATE 90 UG/1
AEROSOL, METERED RESPIRATORY (INHALATION)
Qty: 18 G | Refills: 0 | Status: SHIPPED | OUTPATIENT
Start: 2020-03-13 | End: 2021-01-18 | Stop reason: SDUPTHER

## 2020-03-17 ENCOUNTER — DOCUMENTATION ONLY (OUTPATIENT)
Dept: INTERNAL MEDICINE CLINIC | Age: 65
End: 2020-03-17

## 2020-05-22 NOTE — PROGRESS NOTES
Called to confirm patient for nuclear stress test and patient asked to reschedule for the following week.   Patient scheduled for 6/1/20 at [facility]

## 2020-05-26 ENCOUNTER — HOSPITAL ENCOUNTER (OUTPATIENT)
Dept: NON INVASIVE DIAGNOSTICS | Age: 65
Discharge: HOME | End: 2020-05-26
Attending: INTERNAL MEDICINE

## 2020-06-01 ENCOUNTER — HOSPITAL ENCOUNTER (OUTPATIENT)
Dept: NON INVASIVE DIAGNOSTICS | Age: 65
Discharge: HOME | End: 2020-06-01
Attending: INTERNAL MEDICINE
Payer: MEDICARE

## 2020-06-01 VITALS
BODY MASS INDEX: 19.45 KG/M2 | HEIGHT: 61 IN | WEIGHT: 103 LBS | SYSTOLIC BLOOD PRESSURE: 90 MMHG | DIASTOLIC BLOOD PRESSURE: 60 MMHG

## 2020-06-01 DIAGNOSIS — I25.10 CORONARY ARTERY DISEASE INVOLVING NATIVE CORONARY ARTERY OF NATIVE HEART WITHOUT ANGINA PECTORIS: ICD-10-CM

## 2020-06-01 DIAGNOSIS — Z95.5 HISTORY OF CORONARY ARTERY STENT PLACEMENT: ICD-10-CM

## 2020-06-01 LAB
STRESS BASELINE DIAS BP: 60 MMHG
STRESS BASELINE HR: 54 BPM
STRESS BASELINE SYS BP: 90 MMHG
STRESS ESTIMATED WORKLOAD: 1 METS
STRESS EXERCISE DUR MIN: NORMAL
STRESS PEAK DIAS BP: 60 MMHG
STRESS PEAK SYS BP: 90 MMHG
STRESS PERCENT HR ACHIEVED: 77 %
STRESS POST PEAK HR: 118 BPM
STRESS RATE PRESSURE PRODUCT: NORMAL BPM*MMHG
STRESS ST DEPRESSION: 0 MM
STRESS ST ELEVATION: 0 MM
STRESS TARGET HR: 153 BPM

## 2020-06-01 PROCEDURE — 93017 CV STRESS TEST TRACING ONLY: CPT

## 2020-06-01 PROCEDURE — 74011250636 HC RX REV CODE- 250/636: Performed by: INTERNAL MEDICINE

## 2020-06-01 RX ORDER — SODIUM CHLORIDE 9 MG/ML
250 INJECTION, SOLUTION INTRAVENOUS ONCE
Status: COMPLETED | OUTPATIENT
Start: 2020-06-01 | End: 2020-06-01

## 2020-06-01 RX ADMIN — REGADENOSON 0.4 MG: 0.08 INJECTION, SOLUTION INTRAVENOUS at 09:35

## 2020-06-01 RX ADMIN — SODIUM CHLORIDE 250 ML: 900 INJECTION, SOLUTION INTRAVENOUS at 09:35

## 2020-06-03 NOTE — PROGRESS NOTES
Per your last note \" This lady appears to be doing about as well as we could expect and I have no recommendations for change in her current medical regimen. However, despite the fact that she is asymptomatic it has been over 3 years since she had her stenting procedure and I would like to do a screening nuclear myocardial perfusion study to rule out silent ischemia which I am going to get ordered.

## 2020-06-04 ENCOUNTER — TELEPHONE (OUTPATIENT)
Dept: CARDIOLOGY CLINIC | Age: 65
End: 2020-06-04

## 2020-06-04 NOTE — TELEPHONE ENCOUNTER
I called and placed a message on her answering machine to call regarding her NUC study. Her nuclear myocardial perfusion study appeared normal. Please let the patient know.  ES

## 2020-06-04 NOTE — TELEPHONE ENCOUNTER
----- Message from Loni Milligan RN sent at 6/3/2020  7:37 AM EDT ----- Per your last note \" This lady appears to be doing about as well as we could expect and I have no recommendations for change in her current medical regimen. However, despite the fact that she is asymptomatic it has been over 3 years since she had her stenting procedure and I would like to do a screening nuclear myocardial perfusion study to rule out silent ischemia which I am going to get ordered.

## 2020-06-10 RX ORDER — CLOPIDOGREL BISULFATE 75 MG/1
TABLET ORAL
Qty: 90 TAB | Refills: 4 | Status: SHIPPED | OUTPATIENT
Start: 2020-06-10 | End: 2021-09-16 | Stop reason: SDUPTHER

## 2020-06-10 RX ORDER — METOPROLOL TARTRATE 25 MG/1
TABLET, FILM COATED ORAL
Qty: 90 TAB | Refills: 4 | Status: SHIPPED | OUTPATIENT
Start: 2020-06-10 | End: 2021-09-16 | Stop reason: SDUPTHER

## 2020-07-13 ENCOUNTER — OFFICE VISIT (OUTPATIENT)
Dept: INTERNAL MEDICINE CLINIC | Age: 65
End: 2020-07-13

## 2020-07-13 VITALS
TEMPERATURE: 97.2 F | HEIGHT: 61 IN | SYSTOLIC BLOOD PRESSURE: 92 MMHG | DIASTOLIC BLOOD PRESSURE: 60 MMHG | BODY MASS INDEX: 20.05 KG/M2 | HEART RATE: 60 BPM | RESPIRATION RATE: 12 BRPM | WEIGHT: 106.2 LBS

## 2020-07-13 DIAGNOSIS — Z86.69 HISTORY OF MIGRAINE HEADACHES: ICD-10-CM

## 2020-07-13 DIAGNOSIS — E78.5 HYPERLIPIDEMIA LDL GOAL <100: ICD-10-CM

## 2020-07-13 DIAGNOSIS — I25.10 CORONARY ARTERY DISEASE INVOLVING NATIVE CORONARY ARTERY OF NATIVE HEART WITHOUT ANGINA PECTORIS: Primary | ICD-10-CM

## 2020-07-13 NOTE — PROGRESS NOTES
Landon Hendrickson 1955, is a 77 y.o. female, who is seen today for reevaluation of atherosclerotic heart disease dyslipidemia migraine headaches. She feels well. 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 She walks in the mornings and enjoys that. She is having no chest pain or dyspnea. She had a stress test recently and was told that was normal.  She has gained just 3 pounds but is back on her diet wanting to lose those 3 pounds. She takes her medicine regularly. Past Medical History:   Diagnosis Date    Acute coronary syndrome ([facility] [address] 6/6/16    stent, right coronary artery, 99% occlusion    Allergic rhinitis     Asthma     CAD (coronary artery disease)     [facility]    cervical    Carpal tunnel syndrome     Dyslipidemia     mild    Headache(784.0)     migraine    Hearing loss in right ear 2011    sudden right hearing loss associated with vertigo    Impingement syndrome of shoulder, bursitis, left shoulder 8/12/2010     Current Outpatient Medications   Medication Sig Dispense Refill    clopidogreL (PLAVIX) 75 mg tab take 1 tablet once daily 90 Tab 4    metoprolol tartrate (LOPRESSOR) 25 mg tablet take 1/2 tablet by mouth every 12 hours 90 Tab 4    albuterol (Ventolin HFA) 90 mcg/actuation inhaler inhale 1 to 2 puffs by mouth every 6 hours if needed for wheezing 18 g 0    nitroglycerin (NITROSTAT) 0.4 mg SL tablet 1 Tab by SubLINGual route as needed for Chest Pain. 1 Bottle 3    atorvastatin (LIPITOR) 40 mg tablet take 1 tablet by mouth NIGHTLY 90 Tab 3    cyclobenzaprine (FLEXERIL) 10 mg tablet take 1/2 tablet by mouth three times a day if needed for muscle spasm 30 Tab 0    SUMAtriptan (IMITREX) 100 mg tablet take 1 tablet by mouth ONCE AS NEEDED FOR MIGRAINE FOR UP TO 1 DOSE 9 Tab 2    aspirin 81 mg chewable tablet Take 1 Tab by mouth daily.  30 Tab 1    multivitamin (ONE A DAY) tablet Take 1 Tab by mouth daily.      CALCIUM CARBONATE (CALTRATE 600 PO) Take 1 Tab by mouth two (2) times a day. Visit Vitals  BP 92/60   Pulse 60   Temp 97.2 °F (36.2 °C) (Temporal)   Resp 12   Ht 5' 1\" (1.549 m)   Wt 106 lb 3.2 oz (48.2 kg)   BMI 20.07 kg/m²     Carotids are 2+ without bruits. Lungs are clear to percussion. Good breath sounds with no wheezing or crackles. Heart reveals a regular rhythm with normal S1 and S2 no murmur gallop click or rub. Apical impulse is not palpable. Abdomen is soft and nontender with no splenomegaly or masses and no bruits. Extremities reveal no clubbing cyanosis or edema. Pulses are 2+. Assessment: #1. ASHD asymptomatic, she will continue very healthy diet and current risk reduction strategies. #2.  History of migraine doing much better than she used to, she will use sumatriptan when needed. #3.  Dyslipidemia doing very well, she will continue very healthy diet and atorvastatin 40 mg daily. Follow-up in 6 months with lab with Dr. Renetta Kraft. Karlene Nelson MD FACP    Please note: This document has been produced using voice recognition software. Unrecognized errors in transcription may be present.

## 2020-11-09 ENCOUNTER — OFFICE VISIT (OUTPATIENT)
Dept: CARDIOLOGY CLINIC | Age: 65
End: 2020-11-09
Payer: MEDICARE

## 2020-11-09 VITALS
WEIGHT: 105 LBS | BODY MASS INDEX: 19.83 KG/M2 | SYSTOLIC BLOOD PRESSURE: 102 MMHG | HEART RATE: 55 BPM | DIASTOLIC BLOOD PRESSURE: 72 MMHG | HEIGHT: 61 IN | OXYGEN SATURATION: 97 %

## 2020-11-09 DIAGNOSIS — I25.10 CORONARY ARTERY DISEASE INVOLVING NATIVE CORONARY ARTERY OF NATIVE HEART WITHOUT ANGINA PECTORIS: ICD-10-CM

## 2020-11-09 DIAGNOSIS — E78.5 DYSLIPIDEMIA: Primary | ICD-10-CM

## 2020-11-09 DIAGNOSIS — Z95.5 HISTORY OF CORONARY ARTERY STENT PLACEMENT: ICD-10-CM

## 2020-11-09 PROCEDURE — 93000 ELECTROCARDIOGRAM COMPLETE: CPT | Performed by: INTERNAL MEDICINE

## 2020-11-09 PROCEDURE — G8427 DOCREV CUR MEDS BY ELIG CLIN: HCPCS | Performed by: INTERNAL MEDICINE

## 2020-11-09 PROCEDURE — 99214 OFFICE O/P EST MOD 30 MIN: CPT | Performed by: INTERNAL MEDICINE

## 2020-11-09 PROCEDURE — G8510 SCR DEP NEG, NO PLAN REQD: HCPCS | Performed by: INTERNAL MEDICINE

## 2020-11-09 PROCEDURE — G9899 SCRN MAM PERF RSLTS DOC: HCPCS | Performed by: INTERNAL MEDICINE

## 2020-11-09 PROCEDURE — G8420 CALC BMI NORM PARAMETERS: HCPCS | Performed by: INTERNAL MEDICINE

## 2020-11-09 PROCEDURE — G8399 PT W/DXA RESULTS DOCUMENT: HCPCS | Performed by: INTERNAL MEDICINE

## 2020-11-09 PROCEDURE — G8536 NO DOC ELDER MAL SCRN: HCPCS | Performed by: INTERNAL MEDICINE

## 2020-11-09 NOTE — PROGRESS NOTES
Scot Carson presents today for   Chief Complaint   Patient presents with    Follow-up     1 year follow up        Scot Carson preferred language for health care discussion is english/other. Is someone accompanying this pt? no    Is the patient using any DME equipment during [address]? no    Depression Screening:  3 most recent PHQ Screens 11/9/2020   Little interest or pleasure in doing things Not at all   Feeling down, depressed, irritable, or hopeless Not at all   Total Score PHQ 2 0       Learning Assessment:  Learning Assessment 8/10/2016   PRIMARY LEARNER Patient   PRIMARY LANGUAGE ENGLISH   LEARNER PREFERENCE PRIMARY DEMONSTRATION   ANSWERED BY patient   RELATIONSHIP SELF       Abuse Screening:  Abuse Screening Questionnaire 11/9/2020   Do you ever feel afraid of your partner? N   Are you in a relationship with someone who physically or mentally threatens you? N   Is it safe for you to go home? Y       Fall Risk  Fall Risk Assessment, last 12 mths 11/9/2020   Able to walk? Yes   Fall in past 12 months? No       Pt currently taking Anticoagulant therapy? ASA 81mg every day and Plavix     Coordination of Care:  1. Have you been to the ER, urgent care clinic since your last visit? Hospitalized since your last visit? no    2. Have you seen or consulted any other health care providers outside of the [address] [place] [address] since your last visit? Include any pap smears or colon screening.  no

## 2020-11-09 NOTE — PROGRESS NOTES
HPI:   I saw Sonia . Mina Morton in my office today in cardiovascular evaluation regarding her chronic coronary artery disease. Ms. Mina Morton is a very pleasant 77 year old small  female, who began having some exertional chest tightness on 6/4/16, which progressed to more discomfort with less exertion and prompting a hospitalization on 6/6/16. Nellie Brennan had only a very minimal increase in her biomarkers, but her history prompted a pharmacologic myocardial perfusion study, which demonstrated some inferior wall signs of ischemia and ultimately a cardiac catheterization by my associate Dr. Sabi Stokes and findings with the following anatomy:      1. Patent left main trunk. 2.  30% mid LAD obstruction. 3.  Very small angiographically normal ramus intermediate range. 4.  Diagonal branches were angiographically normal.  5.  20% smooth mid circumflex obstruction with patent obtuse marginal branches. 6.  99% stenosis at the junction of the mid and distal third of the vessel of TIMI2 flow.      The patient subsequently had stenting with 3.0 x 18 mm and 3.0 x 8 mm Xience stents with improvement of TIMI2 flow to TIMI3 flow and 0 residual.     We did do a screening nuclear myocardial perfusion study on her back on June 1, 2020 and that was read as a low risk study with an ejection fraction greater than 80% and normal perfusion of the heart. She comes in today relates that she is continuing quite well. She is remaining quite active and denies any cardiovascular symptomatology at this time. Encounter Diagnoses   Name Primary?  Coronary artery disease involving native coronary artery of native heart without angina pectoris     History of coronary artery stent placement     Dyslipidemia Yes       Discussion: This patient appears to be doing about as well as we could expect and I have no recommendations for change at this time.   She is not having any symptoms to suggest the development of symptomatic obstructive coronary artery disease and we recently did a nuclear myocardial perfusion study as indicated above which appeared to be low risk. Her latest lipid profile which I have a copy of was completed on 2019 and showed total cholesterol 162 with triglycerides of 70, HDL of 75, LDL of 73, and VLDL of 14 which is reasonably good control on Lipitor 40 mg daily although I do think we should get this repeated before Thanksgiving.     Her blood pressure is well controlled today and her EKG appears to be stable so I am simply going to have her seen again in a year by my associate Dr. Ric Salcedo since I will be retiring    PCP: Mignon Esquivel MD      Past Medical History:   Diagnosis Date    Acute coronary syndrome ([facility] 16    stent, right coronary artery, 99% occlusion    Allergic rhinitis     Asthma     CAD (coronary artery disease)     Cancer ([facility]     cervical    Carpal tunnel syndrome     Dyslipidemia     mild    Headache(784.0)     migraine    Hearing loss in right ear     sudden right hearing loss associated with vertigo    Impingement syndrome of shoulder, bursitis, left shoulder 2010       Past Surgical History:   Procedure Laterality Date    ENDOSCOPY, COLON, DIAGNOSTIC      HX  SECTION      X2    HX CORONARY STENT PLACEMENT  16    3.0 x 18 mm Xience stent and 3.0 x 8 mm Xience stent    HX HEART CATHETERIZATION  16    99% RCA    HX HYSTERECTOMY      for cervical cancer; pelvic exams Marjan Barraza    HX ORTHOPAEDIC      left knee surgery arthroscopy    HX ORTHOPAEDIC      carpal tunnel left     HX PELVIC LAPAROSCOPY      X2    HX TONSILLECTOMY       Current Outpatient Medications   Medication Sig    clopidogreL (PLAVIX) 75 mg tab take 1 tablet once daily    metoprolol tartrate (LOPRESSOR) 25 mg tablet take 1/2 tablet by mouth every 12 hours    albuterol (Ventolin HFA) 90 mcg/actuation inhaler inhale 1 to 2 puffs by mouth every 6 hours if needed for wheezing    nitroglycerin (NITROSTAT) 0.4 mg SL tablet 1 Tab by SubLINGual route as needed for Chest Pain.  atorvastatin (LIPITOR) 40 mg tablet take 1 tablet by mouth NIGHTLY    cyclobenzaprine (FLEXERIL) 10 mg tablet take 1/2 tablet by mouth three times a day if needed for muscle spasm    SUMAtriptan (IMITREX) 100 mg tablet take 1 tablet by mouth ONCE AS NEEDED FOR MIGRAINE FOR UP TO 1 DOSE    aspirin 81 mg chewable tablet Take 1 Tab by mouth daily.  multivitamin (ONE A DAY) tablet Take 1 Tab by mouth daily.  CALCIUM CARBONATE (CALTRATE 600 PO) Take 1 Tab by mouth two (2) times a day. No current facility-administered medications for this visit. No Known Allergies    Social History:   Social History     Tobacco Use    Smoking status: Never Smoker    Smokeless tobacco: Never Used   Substance Use Topics    Alcohol use: Yes     Comment: occassionally         Family history: family history includes Cancer in her father, mother, and another family member; Heart Disease in her father. Review of Systems:    Constitutional: Negative . Respiratory: Negative. Cardiovascular: Negative  Gastrointestinal: Negative. Musculoskeletal: Negative. Neurological: Negative. Physical Exam:   The patient is an alert, oriented, well developed, well nourished 77 y.o.  female who was in no acute distress at the time of my examination. Visit Vitals  /72 (BP 1 Location: Left arm, BP Patient Position: Sitting)   Pulse (!) 55   Ht 5' 1\" (1.549 m)   Wt 47.6 kg (105 lb)   SpO2 97%   BMI 19.84 kg/m²      BP Readings from Last 3 Encounters:   11/09/20 102/72   07/13/20 92/60   06/01/20 90/60      Wt Readings from Last 3 Encounters:   11/09/20 47.6 kg (105 lb)   07/13/20 48.2 kg (106 lb 3.2 oz)   06/01/20 46.7 kg (103 lb)     HEENT: Conjuctiva white, mucosa moist, no pallor or cyanosis. NECK: Supple without masses, tenderness or thyromegaly. There was no jugular venous distention.  Carotid are full bilaterally without bruits. CHEST: Symmetrical with good excursion. LUNGS: Clear to auscultation in all fields. HEART: Regular rate and rhythm. The apex is not displaced. There were no lifts, thrills or heaves. There is a normal S1 and S2 without appreciable murmurs, rubs, clicks, or gallops auscultated. ABDOMEN: Soft without masses, tenderness or organomegaly. EXTREMITIES: Full peripheral pulses without peripheral edema. INTEGUMENT: Skin is warm and dry   NEUROLOGICAL: The patient was oriented x3 with motor function grossly intact. Review of Data: See PMH and Cardiology and Imaging sections for cardiac testing  Lab Results   Component Value Date/Time    Cholesterol, total 162 12/04/2019 08:20 AM    HDL Cholesterol 75 (H) 12/04/2019 08:20 AM    LDL, calculated 73 12/04/2019 08:20 AM    Triglyceride 70 12/04/2019 08:20 AM    CHOL/HDL Ratio 2.2 12/04/2019 08:20 AM       Results for orders placed or performed in visit on 11/09/20   AMB POC EKG ROUTINE W/ 12 LEADS, INTER & REP     Status: None    Narrative    Sinus bradycardia rate 54. This EKG is within normal limits and similar to the EKG of November 6, 2019. Rigoberto Mike D.O., F.A.C.C. [facility]  [facility] [place]  [address] [place] [address]. [address] [facility]  [phone]    PLEASE NOTE:  This document has been produced using voice recognition software. Unrecognized errors in transcription may be present.

## 2020-11-24 RX ORDER — ATORVASTATIN CALCIUM 40 MG/1
TABLET, FILM COATED ORAL
Qty: 90 TAB | Refills: 3 | Status: SHIPPED | OUTPATIENT
Start: 2020-11-24 | End: 2021-12-03 | Stop reason: SDUPTHER

## 2021-01-11 ENCOUNTER — HOSPITAL ENCOUNTER (OUTPATIENT)
Dept: LAB | Age: 66
Discharge: HOME | End: 2021-01-11
Payer: MEDICARE

## 2021-01-11 ENCOUNTER — APPOINTMENT (OUTPATIENT)
Dept: INTERNAL MEDICINE CLINIC | Age: 66
End: 2021-01-11

## 2021-01-11 DIAGNOSIS — E78.5 HYPERLIPIDEMIA LDL GOAL <100: ICD-10-CM

## 2021-01-11 LAB
ALBUMIN SERPL-MCNC: 4 G/DL (ref 3.4–5)
ALBUMIN/GLOB SERPL: 1.5 {RATIO} (ref 0.8–1.7)
ALP SERPL-CCNC: 42 U/L (ref 45–117)
ALT SERPL-CCNC: 29 U/L (ref 13–56)
ANION GAP SERPL CALC-SCNC: 6 MMOL/L (ref 3–18)
AST SERPL-CCNC: 20 U/L (ref 10–38)
BILIRUB SERPL-MCNC: 0.3 MG/DL (ref 0.2–1)
BUN SERPL-MCNC: 12 MG/DL (ref 7–18)
BUN/CREAT SERPL: 15 (ref 12–20)
CALCIUM SERPL-MCNC: 9.7 MG/DL (ref 8.5–10.1)
CHLORIDE SERPL-SCNC: 104 MMOL/L (ref 100–111)
CHOLEST SERPL-MCNC: 172 MG/DL
CO2 SERPL-SCNC: 30 MMOL/L (ref 21–32)
CREAT SERPL-MCNC: 0.78 MG/DL (ref 0.6–1.3)
GLOBULIN SER CALC-MCNC: 2.6 G/DL (ref 2–4)
GLUCOSE SERPL-MCNC: 94 MG/DL (ref 74–99)
HDLC SERPL-MCNC: 75 MG/DL (ref 40–60)
HDLC SERPL: 2.3 {RATIO} (ref 0–5)
LDLC SERPL CALC-MCNC: 81 MG/DL (ref 0–100)
LIPID PROFILE,FLP: ABNORMAL
POTASSIUM SERPL-SCNC: 4.7 MMOL/L (ref 3.5–5.5)
PROT SERPL-MCNC: 6.6 G/DL (ref 6.4–8.2)
SODIUM SERPL-SCNC: 140 MMOL/L (ref 136–145)
TRIGL SERPL-MCNC: 80 MG/DL (ref ?–150)
VLDLC SERPL CALC-MCNC: 16 MG/DL

## 2021-01-11 PROCEDURE — 36415 COLL VENOUS BLD VENIPUNCTURE: CPT

## 2021-01-11 PROCEDURE — 80053 COMPREHEN METABOLIC PANEL: CPT

## 2021-01-11 PROCEDURE — 80061 LIPID PANEL: CPT

## 2021-01-18 ENCOUNTER — OFFICE VISIT (OUTPATIENT)
Dept: INTERNAL MEDICINE CLINIC | Age: 66
End: 2021-01-18
Payer: MEDICARE

## 2021-01-18 VITALS
BODY MASS INDEX: 19.83 KG/M2 | SYSTOLIC BLOOD PRESSURE: 110 MMHG | HEIGHT: 61 IN | HEART RATE: 50 BPM | RESPIRATION RATE: 14 BRPM | TEMPERATURE: 97.7 F | DIASTOLIC BLOOD PRESSURE: 63 MMHG | WEIGHT: 105 LBS | OXYGEN SATURATION: 99 %

## 2021-01-18 DIAGNOSIS — E78.5 HYPERLIPIDEMIA LDL GOAL <100: ICD-10-CM

## 2021-01-18 DIAGNOSIS — I25.10 CORONARY ARTERY DISEASE INVOLVING NATIVE CORONARY ARTERY OF NATIVE HEART WITHOUT ANGINA PECTORIS: ICD-10-CM

## 2021-01-18 DIAGNOSIS — J45.20 MILD INTERMITTENT ASTHMA WITHOUT COMPLICATION: ICD-10-CM

## 2021-01-18 DIAGNOSIS — Z79.899 MEDICATION MANAGEMENT: ICD-10-CM

## 2021-01-18 DIAGNOSIS — Z95.5 HISTORY OF CORONARY ARTERY STENT PLACEMENT: ICD-10-CM

## 2021-01-18 DIAGNOSIS — R00.1 BRADYCARDIA: ICD-10-CM

## 2021-01-18 DIAGNOSIS — Z86.69 HISTORY OF MIGRAINE HEADACHES: ICD-10-CM

## 2021-01-18 DIAGNOSIS — D23.30: ICD-10-CM

## 2021-01-18 DIAGNOSIS — Z76.89 ENCOUNTER TO ESTABLISH CARE WITH NEW DOCTOR: Primary | ICD-10-CM

## 2021-01-18 PROCEDURE — G8399 PT W/DXA RESULTS DOCUMENT: HCPCS | Performed by: NURSE PRACTITIONER

## 2021-01-18 PROCEDURE — G8432 DEP SCR NOT DOC, RNG: HCPCS | Performed by: NURSE PRACTITIONER

## 2021-01-18 PROCEDURE — 99215 OFFICE O/P EST HI 40 MIN: CPT | Performed by: NURSE PRACTITIONER

## 2021-01-18 PROCEDURE — G0463 HOSPITAL OUTPT CLINIC VISIT: HCPCS | Performed by: NURSE PRACTITIONER

## 2021-01-18 PROCEDURE — 3017F COLORECTAL CA SCREEN DOC REV: CPT | Performed by: NURSE PRACTITIONER

## 2021-01-18 PROCEDURE — 1090F PRES/ABSN URINE INCON ASSESS: CPT | Performed by: NURSE PRACTITIONER

## 2021-01-18 PROCEDURE — G8536 NO DOC ELDER MAL SCRN: HCPCS | Performed by: NURSE PRACTITIONER

## 2021-01-18 PROCEDURE — G8427 DOCREV CUR MEDS BY ELIG CLIN: HCPCS | Performed by: NURSE PRACTITIONER

## 2021-01-18 PROCEDURE — G8420 CALC BMI NORM PARAMETERS: HCPCS | Performed by: NURSE PRACTITIONER

## 2021-01-18 PROCEDURE — 1101F PT FALLS ASSESS-DOCD LE1/YR: CPT | Performed by: NURSE PRACTITIONER

## 2021-01-18 RX ORDER — ALBUTEROL SULFATE 90 UG/1
AEROSOL, METERED RESPIRATORY (INHALATION)
Qty: 18 G | Refills: 3 | Status: SHIPPED | OUTPATIENT
Start: 2021-01-18 | End: 2021-07-16

## 2021-01-18 RX ORDER — SUMATRIPTAN 100 MG/1
100 TABLET, FILM COATED ORAL
Qty: 9 TAB | Refills: 5 | Status: SHIPPED | OUTPATIENT
Start: 2021-01-18 | End: 2022-01-31 | Stop reason: SDUPTHER

## 2021-01-18 NOTE — PROGRESS NOTES
[facility] [place] [address] [address] [facility], [address] [facility] [place]  [phone] [facility]/[phone] fax    1/18/2021    HPI:   Yoanna Leonard 1955 is a pleasant WHITE OR  female who presents today to establish care and for routine physical exam.  Patient is  and lives alone. She has 2 adult children with one living in [place] and the other in [place]. She does have a daily Name Mirlande. 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 Cardio: In 2016 patient had 2 stents placed by Dr. Nacho Trent after experiencing chest pain with exertion and at rest.  At that time she was placed on metoprolol. She does have a history of low blood pressure and low heart rate. At times she will suffer from lightheadedness but she recovers quickly. She follows up with cardio once a year. Her previous cardiologist, Dr. Radha Farias, recently retired. She will be following up with Dr. Nacho Trent in 1 year as scheduled. Migraines: Her first episode was at during adolescence around 6years old. Started treatment approximately 25 years ago when she was suffering with multiple episodes. She states now she suffers with maybe 1-2 episodes in a 3-month timeframe and the intensity is much less. She will take Imitrex if her migraines occur, this therapy is effective for her. Asthma: Diagnosed as a child. Has albuterol on hand, last used inhaler 3 months ago. She is not sure what causes her flareup. Her previous provider suspected animal dander but patient does not believe this is the cause. Overall she is controlled. Skin cancer: Patient saw Dr. Braulio Gaona with [facility]. She was diagnosed 3 years ago when she had removal of the skin cancer from her right lower arm. She follows up yearly.     Current medications: Albuterol as needed, Imitrex 100 mg as needed, atorvastatin 40 mg nightly, Plavix 75 mg daily, metoprolol 25 mg one half tab twice daily, Nitrostat as needed, aspirin daily, multivitamin, calcium carbonate. Past Medical History:   Diagnosis Date    Acute coronary syndrome ([facility] 2016    stent, right coronary artery, 99% occlusion. Dr Charles Agent    Allergic rhinitis     Asthma     Bradycardia 2021    CAD (coronary artery disease)     [facility]     cervical    Carpal tunnel syndrome     Dyslipidemia     mild    Headache Migraines    Hearing loss in right ear     sudden right hearing loss associated with vertigo    History of coronary artery stent placement 2016    History of migraine headaches 2018    Hyperlipidemia LDL goal <100 2016    Impingement syndrome of shoulder, bursitis, left shoulder 2010    Mild intermittent asthma without complication     Squamous acanthoma of face 2021     Past Surgical History:   Procedure Laterality Date    ENDOSCOPY, COLON, DIAGNOSTIC      HX  SECTION      X2    HX COLONOSCOPY  ,     HX CORONARY STENT PLACEMENT  16    3.0 x 18 mm Xience stent and 3.0 x 8 mm Xience stent    HX HEART CATHETERIZATION  16    99% RCA    HX HYSTERECTOMY      for cervical cancer; pelvic exams Marjan Barraza    HX ORTHOPAEDIC      left knee surgery arthroscopy    HX ORTHOPAEDIC      carpal tunnel left     HX PELVIC LAPAROSCOPY      X2    HX TONSILLECTOMY       Current Outpatient Medications   Medication Sig    albuterol (Ventolin HFA) 90 mcg/actuation inhaler inhale 1 to 2 puffs by mouth every 6 hours if needed for wheezing    SUMAtriptan (IMITREX) 100 mg tablet Take 1 Tab by mouth daily as needed for Migraine.  atorvastatin (LIPITOR) 40 mg tablet take 1 tablet by mouth nightly    clopidogreL (PLAVIX) 75 mg tab take 1 tablet once daily    metoprolol tartrate (LOPRESSOR) 25 mg tablet take 1/2 tablet by mouth every 12 hours    nitroglycerin (NITROSTAT) 0.4 mg SL tablet 1 Tab by SubLINGual route as needed for Chest Pain.     cyclobenzaprine (FLEXERIL) 10 mg tablet take 1/2 tablet by mouth three times a day if needed for muscle spasm    aspirin 81 mg chewable tablet Take 1 Tab by mouth daily.  multivitamin (ONE A DAY) tablet Take 1 Tab by mouth daily.  CALCIUM CARBONATE (CALTRATE 600 PO) Take 1 Tab by mouth two (2) times a day. No current facility-administered medications for this visit. Allergies and Intolerances:   No Known Allergies  Family History:   Family History   Problem Relation Age of Onset    Cancer Mother     Heart Disease Father     Cancer Father         lung ca    Migraines Father     Cancer Other     Diabetes Maternal Aunt     Heart Disease Paternal Uncle     Heart Disease Paternal Uncle     Heart Disease Maternal Grandfather     Heart Disease Paternal Grandfather     Hypertension Maternal Grandmother      Social History:   She  reports that she has never smoked. She has never used smokeless tobacco.   Social History     Substance and Sexual Activity   Alcohol Use Yes    Comment: an ocassional glass of wine; [employer] (maybe 5-6 a year)     Immunization History:  Immunization History   Administered Date(s) Administered    Influenza Vaccine (Tri) Adjuvanted (>65 Yrs FLUAD TRI 53427) 12/05/2018, 12/11/2019    Influenza Vaccine Whole 12/12/2003    Influenza, Quadrivalent, Adjuvanted (>65 Yrs FLUAD QUAD [id number]) 09/22/2020    Pneumococcal Conjugate (PCV-13) 12/11/2019, 09/30/2020    TB Skin Test (PPD) Intradermal 08/27/2019    TD Vaccine 05/05/2006    Zoster Vaccine, Live 10/16/2015       Review of Systems:   As above included in HPI. Otherwise 11 point review of systems negative including constitutional, skin, HENT, eyes, respiratory, cardiovascular, gastrointestinal, genitourinary, musculoskeletal, endocrine, hematologic, allergy, and neurologic.       Physical:   Visit Vitals  /63   Pulse (!) 50   Temp 97.7 °F (36.5 °C) (Oral)   Resp 14   Ht 5' 1\" (1.549 m)   Wt 105 lb (47.6 kg)   SpO2 99%   BMI 19.84 kg/m²      Wt Readings from Last 3 Encounters:   01/18/21 105 lb (47.6 kg)   11/09/20 105 lb (47.6 kg)   07/13/20 106 lb 3.2 oz (48.2 kg)         Exam:   Physical Exam  Constitutional:       Appearance: Normal appearance. HENT:      Head: Normocephalic and atraumatic. Right Ear: Tympanic membrane, ear canal and external ear normal.      Left Ear: Tympanic membrane, ear canal and external ear normal.      Mouth/Throat:      Mouth: Mucous membranes are moist.   Eyes:      Extraocular Movements: Extraocular movements intact. Neck:      Musculoskeletal: Normal range of motion. Cardiovascular:      Rate and Rhythm: Normal rate and regular rhythm. Pulses: Normal pulses. Heart sounds: Normal heart sounds. Comments: No edema noted to aly LEs  Pulmonary:      Effort: Pulmonary effort is normal. No respiratory distress. Breath sounds: Normal breath sounds. Abdominal:      General: Abdomen is flat. Bowel sounds are normal.      Palpations: Abdomen is soft. Musculoskeletal: Normal range of motion. Skin:     General: Skin is warm and dry. Comments: Healed scar noted to right forearm-removal skin cancer. Neurological:      Mental Status: She is alert and oriented to person, place, and time. Psychiatric:         Mood and Affect: Mood normal.         Behavior: Behavior normal.        Body mass index is 19.84 kg/m². Review of Data:  Labs reviewed: yes  Lipids and CMP good    Impression:  Patient Active Problem List   Diagnosis Code    Allergic rhinitis 80    Hearing loss in right ear H91.91    Hyperlipidemia LDL goal <100 E78.5    History of coronary artery stent placement Z95.5    CAD (coronary artery disease) I25.10    History of migraine headaches Z86.69    Bradycardia R00.1    Mild intermittent asthma without complication D01.30    Squamous acanthoma of face D23.30       Plan:  1.  Encounter to establish care with new doctor  Patient is transferring to me from their previous provider. I spent no less than 40 minutes reviewing and updating the chart to include previous labs, diagnostics, and specialty notes. 2. Hyperlipidemia LDL goal <100  Cholesterol is well controlled under current therapy of atorvastatin 40 mg daily. Patient tolerates this medication well. No change in therapy. 3. Coronary artery disease involving native coronary artery of native heart without angina pectoris  Patient had a 99% blockage back in 2016. She had 2 stents placed and she has done very well every since. She continues her Plavix and aspirin therapy. She has not required the Nitrostat for chest pain. 4. History of coronary artery stent placement      5. Bradycardia  After reviewing patient's past history of blood pressures and heart rate. Noted patient to range 46-73 with her heart rate. Her EKG obtained via her cardiologist in November 2020 revealed sinus bradycardia. She continues the metoprolol 25 mg one half tab twice daily as prescribed by her cardiologist.  She does have symptoms at times such as dizziness but she will rest and recover very quickly. Patient to follow-up with her new cardiologist Dr. Sharyle Neighbors as instructed. 6. History of migraine headaches  Patient has a long history of migraines but states her episodes have reduced drastically from 1-2 episodes in 3 months the intensity is also a lot less than what it used to be. Imitrex is effective for her when needed. - SUMAtriptan (IMITREX) 100 mg tablet; Take 1 Tab by mouth daily as needed for Migraine. Dispense: 9 Tab; Refill: 5    7. Mild intermittent asthma without complication  Patient has suffered with mild asthma since childhood. This asthma is pretty much controlled without treatment the patient does require an albuterol inhaler in case of asthma flareup. The last time she used her inhaler was approximately 3 months ago.     - albuterol (Ventolin HFA) 90 mcg/actuation inhaler; inhale 1 to 2 puffs by mouth every 6 hours if needed for wheezing  Dispense: 18 g; Refill: 3    8. Squamous acanthoma of face  Patient had 2 biopsies, one to the left side of her face and one to her right forearm back in 2018. According to Dr. Pushpa Silva note November 2018 he documented squamous acanthoma of face but no mention of cancer to the right forearm. Patient to follow-up yearly as discussed with specialist.    9. Medication management  Reviewed medication and completed the medication reconciliation with the patient. Reviewed side effects of medications with the patient. Questions were answered and patient verb understanding. Follow up 6 months  Labs needed 1 week prior to appt: No    Dr. Yovana Ag, AGNP-C, DNP  [facility]       Level 5:   40 minutes with the patient on counseling, answering questions, and/or coordination of care. Complex medical review of medical history, lab results, and testing. Complicated management plan formulated.

## 2021-01-18 NOTE — PROGRESS NOTES
Gala Osman presents today for   Chief Complaint   Patient presents with   [facility] Establish Care    Medication Refill              Coordination of Care:  1. Have you been to the ER, urgent care clinic since your last visit? Hospitalized since your last visit? no    2. Have you seen or consulted any other health care providers outside of the [address] [place] [address] since your last visit? Include any pap smears or colon screening.  no

## 2021-02-02 ENCOUNTER — TELEPHONE (OUTPATIENT)
Dept: INTERNAL MEDICINE CLINIC | Age: 66
End: 2021-02-02

## 2021-02-02 NOTE — TELEPHONE ENCOUNTER
Patient stating she was put on Atorvastatin 40 mg in 2016. When she first started taking it, it was causing major diarrhea problems. Dr. Julia Lepe had her start taking half a pill. Stating her chholesterol has gone up some over the last 3 years. Her best friend is a retired nurse and suggested she call and find out if she should go back to taking a whole pill. It looks like Dr. Yesi Monroe had prescribed this to her back in 11/2020 but wasn't aware she was cutting the pill in half. Dr. Yesi Monroe has since retired.

## 2021-03-04 DIAGNOSIS — J45.20 MILD INTERMITTENT ASTHMA WITHOUT COMPLICATION: Primary | ICD-10-CM

## 2021-03-05 RX ORDER — ALBUTEROL SULFATE 90 UG/1
AEROSOL, METERED RESPIRATORY (INHALATION)
Qty: 1 INHALER | Refills: 2 | Status: SHIPPED | OUTPATIENT
Start: 2021-03-05 | End: 2022-02-03 | Stop reason: SDUPTHER

## 2021-03-18 ENCOUNTER — OFFICE VISIT (OUTPATIENT)
Dept: ORTHOPEDIC SURGERY | Age: 66
End: 2021-03-18
Payer: MEDICARE

## 2021-03-18 VITALS
OXYGEN SATURATION: 100 % | WEIGHT: 106 LBS | TEMPERATURE: 96.4 F | HEIGHT: 61 IN | HEART RATE: 43 BPM | BODY MASS INDEX: 20.01 KG/M2 | RESPIRATION RATE: 18 BRPM

## 2021-03-18 DIAGNOSIS — M65.4 DE QUERVAIN'S TENOSYNOVITIS, LEFT: Primary | ICD-10-CM

## 2021-03-18 DIAGNOSIS — M25.532 LEFT WRIST PAIN: ICD-10-CM

## 2021-03-18 PROCEDURE — 73110 X-RAY EXAM OF WRIST: CPT | Performed by: ORTHOPAEDIC SURGERY

## 2021-03-18 PROCEDURE — G8427 DOCREV CUR MEDS BY ELIG CLIN: HCPCS | Performed by: ORTHOPAEDIC SURGERY

## 2021-03-18 PROCEDURE — 1090F PRES/ABSN URINE INCON ASSESS: CPT | Performed by: ORTHOPAEDIC SURGERY

## 2021-03-18 PROCEDURE — 20550 NJX 1 TENDON SHEATH/LIGAMENT: CPT | Performed by: ORTHOPAEDIC SURGERY

## 2021-03-18 PROCEDURE — G8536 NO DOC ELDER MAL SCRN: HCPCS | Performed by: ORTHOPAEDIC SURGERY

## 2021-03-18 PROCEDURE — G8399 PT W/DXA RESULTS DOCUMENT: HCPCS | Performed by: ORTHOPAEDIC SURGERY

## 2021-03-18 PROCEDURE — 3017F COLORECTAL CA SCREEN DOC REV: CPT | Performed by: ORTHOPAEDIC SURGERY

## 2021-03-18 PROCEDURE — G8510 SCR DEP NEG, NO PLAN REQD: HCPCS | Performed by: ORTHOPAEDIC SURGERY

## 2021-03-18 PROCEDURE — G8420 CALC BMI NORM PARAMETERS: HCPCS | Performed by: ORTHOPAEDIC SURGERY

## 2021-03-18 PROCEDURE — 1101F PT FALLS ASSESS-DOCD LE1/YR: CPT | Performed by: ORTHOPAEDIC SURGERY

## 2021-03-18 PROCEDURE — 99214 OFFICE O/P EST MOD 30 MIN: CPT | Performed by: ORTHOPAEDIC SURGERY

## 2021-03-18 NOTE — PROGRESS NOTES
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 Worker's Compensation and legal considerations: not known. Vitals:    03/18/21 0909   Pulse: (!) 43   Resp: 18   Temp: (!) 96.4 °F (35.8 °C)   TempSrc: Temporal   SpO2: 100%   Weight: 106 lb (48.1 kg)   Height: 5' 1\" (1.549 m)   PainSc:   2   PainLoc: Wrist           Chief Complaint   Patient presents with    Wrist Pain     left wrist pain       HPI: Patient presents today with a new problem of a left wrist bump and pain. She denies any injury. She reports it is tender in the area and radiates up into the thumb.    11/2019 HPI: Patient comes in today with complaints of recurrent left thumb triggering and pain. She is previously had 2 injections for this in the 80s last about 3 months. Initial HPI: Patient comes in today with complaints of left thumb locking and pain. She reports is been going on for 3 to 4 weeks. She denies any injury. Date of onset: May 2019    Injury: No    Prior Treatment:  Yes: Comment: Left thumb A1 pulley injections    Numbness/ Tingling: No    ROS: Review of Systems - General ROS: negative  Respiratory ROS: no cough, shortness of breath, or wheezing  Cardiovascular ROS: no chest pain or dyspnea on exertion  Musculoskeletal ROS: positive for - pain in thumb - left  Neurological ROS: negative  Dermatological ROS: negative    Past Medical History:   Diagnosis Date    Acute coronary syndrome ([facility] [address] 06/06/2016    stent, right coronary artery, 99% occlusion.  Dr Nacho Trent    Allergic rhinitis     Asthma     Bradycardia 1/18/2021    CAD (coronary artery disease)     Cancer ([facility] [address] 1986    cervical    Carpal tunnel syndrome     Dyslipidemia     mild    Headache Migraines    Hearing loss in right ear 2011    sudden right hearing loss associated with vertigo    History of coronary artery stent placement 6/17/2016    History of migraine headaches 12/5/2018    Hyperlipidemia LDL goal <100 6/17/2016    Impingement syndrome of shoulder, bursitis, left shoulder 2010    Mild intermittent asthma without complication     Squamous acanthoma of face 2021       Past Surgical History:   Procedure Laterality Date    ENDOSCOPY, COLON, DIAGNOSTIC      HX  SECTION      X2    HX COLONOSCOPY  ,     HX CORONARY STENT PLACEMENT  16    3.0 x 18 mm Xience stent and 3.0 x 8 mm Xience stent    HX HEART CATHETERIZATION  16    99% RCA    HX HYSTERECTOMY      for cervical cancer; pelvic exams [place]    HX ORTHOPAEDIC      left knee surgery arthroscopy    HX ORTHOPAEDIC      carpal tunnel left     HX PELVIC LAPAROSCOPY      X2    HX TONSILLECTOMY         Current Outpatient Medications   Medication Sig Dispense Refill    albuterol (PROVENTIL HFA, VENTOLIN HFA, PROAIR HFA) 90 mcg/actuation inhaler 1-2 puffs every 6 hours as needed 1 Inhaler 2    atorvastatin (LIPITOR) 40 mg tablet take 1 tablet by mouth nightly 90 Tab 3    clopidogreL (PLAVIX) 75 mg tab take 1 tablet once daily 90 Tab 4    metoprolol tartrate (LOPRESSOR) 25 mg tablet take 1/2 tablet by mouth every 12 hours 90 Tab 4    nitroglycerin (NITROSTAT) 0.4 mg SL tablet 1 Tab by SubLINGual route as needed for Chest Pain. 1 Bottle 3    cyclobenzaprine (FLEXERIL) 10 mg tablet take 1/2 tablet by mouth three times a day if needed for muscle spasm 30 Tab 0    aspirin 81 mg chewable tablet Take 1 Tab by mouth daily. 30 Tab 1    multivitamin (ONE A DAY) tablet Take 1 Tab by mouth daily.  CALCIUM CARBONATE (CALTRATE 600 PO) Take 1 Tab by mouth two (2) times a day.  albuterol (Ventolin HFA) 90 mcg/actuation inhaler inhale 1 to 2 puffs by mouth every 6 hours if needed for wheezing 18 g 3    SUMAtriptan (IMITREX) 100 mg tablet Take 1 Tab by mouth daily as needed for Migraine.  9 Tab 5     Current Facility-Administered Medications   Medication Dose Route Frequency Provider Last Rate Last Admin    triamcinolone acetonide (KENALOG) 10 mg/mL injection 5 mg  5 mg Other ONCE Pelon Joyn XIOMY, DO           No Known Allergies      PE:     Physical Exam  Vitals signs and nursing note reviewed. Constitutional:       General: She is not in acute distress. Appearance: Normal appearance. She is not ill-appearing. Neck:      Musculoskeletal: Normal range of motion. Cardiovascular:      Pulses: Normal pulses. Pulmonary:      Effort: Pulmonary effort is normal.   Musculoskeletal: Normal range of motion. General: Swelling and tenderness present. No deformity or signs of injury. Right lower leg: No edema. Left lower leg: No edema. Skin:     General: Skin is warm and dry. Capillary Refill: Capillary refill takes less than 2 seconds. Findings: No bruising or erythema. Neurological:      General: No focal deficit present. Mental Status: She is alert and oriented to person, place, and time. Cranial Nerves: No cranial nerve deficit. Sensory: No sensory deficit. Psychiatric:         Mood and Affect: Mood normal.         Behavior: Behavior normal.         Wrist:    Tenderness L R Test L R   1st Ext Comp + - Finkelstein's + -   Snuff Box - - Parrish - -   2nd Ext Comp - - S-L Shear - -   S-L Joint - - L-T Shear - -   L-T Joint - - DRUJ Sup - -   6th Ext Comp - - DRUJ Pro - -   Ulnar Snuff - - DRUJ Grind - -   Fovea - - TFCC - -   STT Joint - - Mid-Carp Inst - -   FCR - - P-T Grind - -   Intersection - - ECU Sublux. - -      Dorsal Ganglion: + 5 mm on the left first dorsal compartment   Volar Ganglion: -      ROM: Full      Imaging:     3/18/21 3 views of left wrist does not show any fracture, dislocation, or any other osseous abnormalities. ICD-10-CM ICD-9-CM    1. De Quervain's tenosynovitis, left  M65.4 727.04 AMB SUPPLY ORDER      triamcinolone acetonide (KENALOG) 10 mg/mL injection 5 mg      INJECT TENDON SHEATH/LIGAMENT   2.  Left wrist pain  M25.532 719.43 AMB POC XRAY, WRIST; COMPLETE, 3+ VIE Plan:     Left wrist first dorsal compartment injection. Left wrist DQ brace to be worn at all times for 6 weeks. Follow-up and Dispositions    · Return in about 6 weeks (around 4/29/2021) for Reevaluation and exercises. Plan was reviewed with patient, who verbalized agreement and understanding of the plan    [address] NOTE        Chart reviewed for the following:   Osei MAYS DO, have reviewed the History, Physical and updated the Allergic reactions for [address] performed immediately prior to start of procedure:   Edita MAYS DO, have performed the following reviews on [facility] prior to the start of the procedure:            * Patient was identified by name and date of birth   * Agreement on procedure being performed was verified  * Risks and Benefits explained to the patient  * Procedure site verified and marked as necessary  * Patient was positioned for comfort  * Consent was signed and verified     Time: 09:29 AM      Date of procedure: 3/18/2021    Procedure performed by: Edita Mari DO    Provider assisted by: Silvia Coto LPN    Patient assisted by: self    How tolerated by patient: tolerated the procedure well with no complications    Post Procedural Pain Scale: 0 - No Hurt    Comments: none    Procedure:  After consent was obtained, using sterile technique the A1 pulley was prepped. Local anesthetic used: 1% lidocaine. Kenalog 5 mg and was then injected and the needle withdrawn. The procedure was well tolerated. The patient is asked to continue to rest the area for a few more days before resuming regular activities. It may be more painful for the first 1-2 days. Watch for fever, or increased swelling or persistent pain in the joint. Call or return to clinic prn if such symptoms occur or there is failure to improve as anticipated.

## 2021-04-29 ENCOUNTER — OFFICE VISIT (OUTPATIENT)
Dept: ORTHOPEDIC SURGERY | Age: 66
End: 2021-04-29
Payer: MEDICARE

## 2021-04-29 VITALS
HEART RATE: 63 BPM | RESPIRATION RATE: 16 BRPM | WEIGHT: 106 LBS | BODY MASS INDEX: 20.01 KG/M2 | OXYGEN SATURATION: 98 % | HEIGHT: 61 IN

## 2021-04-29 DIAGNOSIS — M65.4 DE QUERVAIN'S TENOSYNOVITIS, LEFT: Primary | ICD-10-CM

## 2021-04-29 PROCEDURE — G8510 SCR DEP NEG, NO PLAN REQD: HCPCS | Performed by: ORTHOPAEDIC SURGERY

## 2021-04-29 PROCEDURE — G8399 PT W/DXA RESULTS DOCUMENT: HCPCS | Performed by: ORTHOPAEDIC SURGERY

## 2021-04-29 PROCEDURE — 99213 OFFICE O/P EST LOW 20 MIN: CPT | Performed by: ORTHOPAEDIC SURGERY

## 2021-04-29 PROCEDURE — 1101F PT FALLS ASSESS-DOCD LE1/YR: CPT | Performed by: ORTHOPAEDIC SURGERY

## 2021-04-29 PROCEDURE — G8427 DOCREV CUR MEDS BY ELIG CLIN: HCPCS | Performed by: ORTHOPAEDIC SURGERY

## 2021-04-29 PROCEDURE — 3017F COLORECTAL CA SCREEN DOC REV: CPT | Performed by: ORTHOPAEDIC SURGERY

## 2021-04-29 PROCEDURE — G8420 CALC BMI NORM PARAMETERS: HCPCS | Performed by: ORTHOPAEDIC SURGERY

## 2021-04-29 PROCEDURE — 1090F PRES/ABSN URINE INCON ASSESS: CPT | Performed by: ORTHOPAEDIC SURGERY

## 2021-04-29 PROCEDURE — G8536 NO DOC ELDER MAL SCRN: HCPCS | Performed by: ORTHOPAEDIC SURGERY

## 2021-04-29 NOTE — PROGRESS NOTES
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 Worker's Compensation and legal considerations: not known. Vitals:    04/29/21 0845   Pulse: 63   Resp: 16   SpO2: 98%   Weight: 106 lb (48.1 kg)   Height: 5' 1\" (1.549 m)   PainSc:   0 - No pain   PainLoc: Wrist           Chief Complaint   Patient presents with    Wrist Pain     Left wrist       HPI: Patient presents today for follow-up after receiving a left first dorsal compartment injection and wearing a brace for the past 6 weeks. She reports her pain is completely resolved. 3/18/2021 HPI: Patient presents today with a new problem of a left wrist bump and pain. She denies any injury. She reports it is tender in the area and radiates up into the thumb.    11/2019 HPI: Patient comes in today with complaints of recurrent left thumb triggering and pain. She is previously had 2 injections for this in the 80s last about 3 months. Initial HPI: Patient comes in today with complaints of left thumb locking and pain. She reports is been going on for 3 to 4 weeks. She denies any injury. Date of onset: May 2019    Injury: No    Prior Treatment:  Yes: Comment: Left thumb A1 pulley injections. Left first dorsal compartment injection and brace wear. Numbness/ Tingling: No    ROS: Review of Systems - General ROS: negative  Respiratory ROS: no cough, shortness of breath, or wheezing  Cardiovascular ROS: no chest pain or dyspnea on exertion  Musculoskeletal ROS: positive for - pain in thumb - left  Neurological ROS: negative  Dermatological ROS: negative    Past Medical History:   Diagnosis Date    Acute coronary syndrome ([facility] [address] 06/06/2016    stent, right coronary artery, 99% occlusion.  Dr Tc Yuan    Allergic rhinitis     Asthma     Bradycardia 1/18/2021    CAD (coronary artery disease)     Cancer ([facility] [address] 1986    cervical    Carpal tunnel syndrome     Dyslipidemia     mild    Headache Migraines    Hearing loss in right ear 2011    sudden right hearing loss associated with vertigo    History of coronary artery stent placement 2016    History of migraine headaches 2018    Hyperlipidemia LDL goal <100 2016    Impingement syndrome of shoulder, bursitis, left shoulder 2010    Mild intermittent asthma without complication 2837    Squamous acanthoma of face 2021       Past Surgical History:   Procedure Laterality Date    ENDOSCOPY, COLON, DIAGNOSTIC      HX  SECTION      X2    HX COLONOSCOPY  ,     HX CORONARY STENT PLACEMENT  16    3.0 x 18 mm Xience stent and 3.0 x 8 mm Xience stent    HX HEART CATHETERIZATION  16    99% RCA    HX HYSTERECTOMY      for cervical cancer; pelvic exams [facility]    HX ORTHOPAEDIC      left knee surgery arthroscopy    HX ORTHOPAEDIC      carpal tunnel left     HX PELVIC LAPAROSCOPY      X2    HX TONSILLECTOMY         Current Outpatient Medications   Medication Sig Dispense Refill    albuterol (PROVENTIL HFA, VENTOLIN HFA, PROAIR HFA) 90 mcg/actuation inhaler 1-2 puffs every 6 hours as needed 1 Inhaler 2    albuterol (Ventolin HFA) 90 mcg/actuation inhaler inhale 1 to 2 puffs by mouth every 6 hours if needed for wheezing 18 g 3    SUMAtriptan (IMITREX) 100 mg tablet Take 1 Tab by mouth daily as needed for Migraine. 9 Tab 5    atorvastatin (LIPITOR) 40 mg tablet take 1 tablet by mouth nightly 90 Tab 3    clopidogreL (PLAVIX) 75 mg tab take 1 tablet once daily 90 Tab 4    metoprolol tartrate (LOPRESSOR) 25 mg tablet take 1/2 tablet by mouth every 12 hours 90 Tab 4    nitroglycerin (NITROSTAT) 0.4 mg SL tablet 1 Tab by SubLINGual route as needed for Chest Pain. 1 Bottle 3    cyclobenzaprine (FLEXERIL) 10 mg tablet take 1/2 tablet by mouth three times a day if needed for muscle spasm 30 Tab 0    aspirin 81 mg chewable tablet Take 1 Tab by mouth daily. 30 Tab 1    multivitamin (ONE A DAY) tablet Take 1 Tab by mouth daily.       CALCIUM CARBONATE (CALTRATE 600 PO) Take 1 Tab by mouth two (2) times a day. No Known Allergies      PE:     Physical Exam  Vitals signs and nursing note reviewed. Constitutional:       General: She is not in acute distress. Appearance: Normal appearance. She is not ill-appearing. Neck:      Musculoskeletal: Normal range of motion. Cardiovascular:      Pulses: Normal pulses. Pulmonary:      Effort: Pulmonary effort is normal.   Musculoskeletal: Normal range of motion. General: No swelling, tenderness, deformity or signs of injury. Right lower leg: No edema. Left lower leg: No edema. Skin:     General: Skin is warm and dry. Capillary Refill: Capillary refill takes less than 2 seconds. Findings: No bruising or erythema. Neurological:      General: No focal deficit present. Mental Status: She is alert and oriented to person, place, and time. Cranial Nerves: No cranial nerve deficit. Sensory: No sensory deficit. Psychiatric:         Mood and Affect: Mood normal.         Behavior: Behavior normal.         Wrist:    Tenderness L R Test L R   1st Ext Comp + - Finkelstein's + -   Snuff Box - - Parrish - -   2nd Ext Comp - - S-L Shear - -   S-L Joint - - L-T Shear - -   L-T Joint - - DRUJ Sup - -   6th Ext Comp - - DRUJ Pro - -   Ulnar Snuff - - DRUJ Grind - -   Fovea - - TFCC - -   STT Joint - - Mid-Carp Inst - -   FCR - - P-T Grind - -   Intersection - - ECU Sublux. - -      Dorsal Ganglion: + 5 mm on the left first dorsal compartment   Volar Ganglion: -      ROM: Full      Imaging:     3/18/21 3 views of left wrist does not show any fracture, dislocation, or any other osseous abnormalities. ICD-10-CM ICD-9-CM    1. De Quervain's tenosynovitis, left  M65.4 727.04        Plan:     Discussed and educated on exercises for de Quervain's. Follow-up and Dispositions    · Return if symptoms worsen or fail to improve.          Plan was reviewed with patient, who verbalized agreement and understanding of the plan

## 2021-06-09 RX ORDER — NITROGLYCERIN 0.4 MG/1
0.4 TABLET SUBLINGUAL AS NEEDED
Qty: 1 BOTTLE | Refills: 3 | Status: SHIPPED | OUTPATIENT
Start: 2021-06-09

## 2021-06-23 RX ORDER — CYCLOBENZAPRINE HCL 10 MG
5 TABLET ORAL
Qty: 30 TABLET | Refills: 0 | Status: SHIPPED | OUTPATIENT
Start: 2021-06-23 | End: 2022-08-01 | Stop reason: SDUPTHER

## 2021-06-23 NOTE — TELEPHONE ENCOUNTER
Last Visit: 1/18/21 with DARRICK Goss  Next Appointment: 7/16/21 with DARRICK Goss  Previous Refill Encounter(s): 9/30/19 #30    Requested Prescriptions     Pending Prescriptions Disp Refills    cyclobenzaprine (FLEXERIL) 10 mg tablet 30 Tablet 0     Sig: Take 0.5 Tablets by mouth three (3) times daily as needed for Muscle Spasm(s).

## 2021-06-23 NOTE — TELEPHONE ENCOUNTER
Patient is requesting cyclobenzaprine (FLEXERIL) 10 mg tablet called into the [facility] off [address]

## 2021-07-16 ENCOUNTER — OFFICE VISIT (OUTPATIENT)
Dept: INTERNAL MEDICINE CLINIC | Age: 66
End: 2021-07-16
Payer: MEDICARE

## 2021-07-16 VITALS
BODY MASS INDEX: 19.94 KG/M2 | TEMPERATURE: 97.6 F | HEART RATE: 55 BPM | SYSTOLIC BLOOD PRESSURE: 102 MMHG | HEIGHT: 61 IN | DIASTOLIC BLOOD PRESSURE: 60 MMHG | RESPIRATION RATE: 14 BRPM | WEIGHT: 105.6 LBS | OXYGEN SATURATION: 95 %

## 2021-07-16 DIAGNOSIS — I25.10 CORONARY ARTERY DISEASE INVOLVING NATIVE CORONARY ARTERY OF NATIVE HEART WITHOUT ANGINA PECTORIS: ICD-10-CM

## 2021-07-16 DIAGNOSIS — Z86.69 HISTORY OF MIGRAINE HEADACHES: ICD-10-CM

## 2021-07-16 DIAGNOSIS — Z00.00 MEDICARE ANNUAL WELLNESS VISIT, SUBSEQUENT: Primary | ICD-10-CM

## 2021-07-16 DIAGNOSIS — R00.1 BRADYCARDIA: ICD-10-CM

## 2021-07-16 DIAGNOSIS — Z71.89 ADVANCED DIRECTIVES, COUNSELING/DISCUSSION: ICD-10-CM

## 2021-07-16 DIAGNOSIS — E78.5 HYPERLIPIDEMIA LDL GOAL <100: ICD-10-CM

## 2021-07-16 DIAGNOSIS — J45.20 MILD INTERMITTENT ASTHMA WITHOUT COMPLICATION: ICD-10-CM

## 2021-07-16 DIAGNOSIS — Z23 ENCOUNTER FOR IMMUNIZATION: ICD-10-CM

## 2021-07-16 PROCEDURE — G8427 DOCREV CUR MEDS BY ELIG CLIN: HCPCS | Performed by: NURSE PRACTITIONER

## 2021-07-16 PROCEDURE — G0463 HOSPITAL OUTPT CLINIC VISIT: HCPCS | Performed by: NURSE PRACTITIONER

## 2021-07-16 PROCEDURE — 99497 ADVNCD CARE PLAN 30 MIN: CPT | Performed by: NURSE PRACTITIONER

## 2021-07-16 PROCEDURE — G8536 NO DOC ELDER MAL SCRN: HCPCS | Performed by: NURSE PRACTITIONER

## 2021-07-16 PROCEDURE — G8399 PT W/DXA RESULTS DOCUMENT: HCPCS | Performed by: NURSE PRACTITIONER

## 2021-07-16 PROCEDURE — 1090F PRES/ABSN URINE INCON ASSESS: CPT | Performed by: NURSE PRACTITIONER

## 2021-07-16 PROCEDURE — G8420 CALC BMI NORM PARAMETERS: HCPCS | Performed by: NURSE PRACTITIONER

## 2021-07-16 PROCEDURE — 1101F PT FALLS ASSESS-DOCD LE1/YR: CPT | Performed by: NURSE PRACTITIONER

## 2021-07-16 PROCEDURE — 3017F COLORECTAL CA SCREEN DOC REV: CPT | Performed by: NURSE PRACTITIONER

## 2021-07-16 PROCEDURE — G8432 DEP SCR NOT DOC, RNG: HCPCS | Performed by: NURSE PRACTITIONER

## 2021-07-16 PROCEDURE — 99215 OFFICE O/P EST HI 40 MIN: CPT | Performed by: NURSE PRACTITIONER

## 2021-07-16 PROCEDURE — 90732 PPSV23 VACC 2 YRS+ SUBQ/IM: CPT | Performed by: NURSE PRACTITIONER

## 2021-07-16 PROCEDURE — G0439 PPPS, SUBSEQ VISIT: HCPCS | Performed by: NURSE PRACTITIONER

## 2021-07-16 NOTE — PATIENT INSTRUCTIONS

## 2021-07-16 NOTE — ACP (ADVANCE CARE PLANNING)
Advance Care Planning     Advance Care Planning (ACP) Physician/NP/PA Conversation      Date of Conversation: 7/16/2021  Conducted with: Patient with Decision Making Capacity    Healthcare Decision Maker:     Primary Decision Maker: Jyothi Patten - [phone]  Click here to complete [address] including selection of the Healthcare Decision Maker Relationship (ie \"Primary\")  Today we documented Decision Maker(s). The patient will provide ACP documents. Care Preferences:    Hospitalization: \"If your health worsens and it becomes clear that your chance of recovery is unlikely, what would be your preference regarding hospitalization? \"  The patient would prefer comfort-focused treatment without hospitalization. Ventilation: \"If you were unable to breathe on your own and your chance of recovery was unlikely, what would be your preference about the use of a ventilator (breathing machine) if it was available to you? \"   The patient would NOT desire the use of a ventilator. Resuscitation: \"In the event your heart stopped as a result of an underlying serious health condition, would you want attempts to be made to restart your heart, or would you prefer a natural death? \"   Yes, attempt to resuscitate.     Additional topics discussed: ventilation preferences, hospitalization preferences, resuscitation preferences and No life prolonging measures (life support)    Conversation Outcomes / Follow-Up Plan:   ACP in process - completing/providing documents   Reviewed DNR/DNI and patient elects Full Code (Attempt Resuscitation)     Length of Voluntary ACP Conversation in minutes:  16 minutes    Devonte Armstrong DNP

## 2021-07-16 NOTE — PROGRESS NOTES
[facility] [address] [facility] [place], [address]  [phone] [id number]/[phone] fax    7/16/2021    HPI:   Taj Tan 1955 is a pleasant WHITE/NON- female who presents today for routine follow up. Patient is  and lives alone. 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 Cardio: In 2016 patient had 2 stents placed by Dr. River Holman after experiencing chest pain with exertion and at rest.  At that time she was placed on metoprolol. She does have a history of low blood pressure and low heart rate. At times she will suffer from lightheadedness but she recovers quickly. Continues atorvastatin therapy. She follows up with cardio once a year. She will be following up with Dr. River Holman in 1 year as scheduled. Migraines: Her first episode was at during adolescence around 6years old. Started treatment approximately 25 years ago when she was suffering with multiple episodes. She suffers with maybe 1-2 episodes in a 3-month timeframe and the intensity is much less. She will take Imitrex if her migraines occur, this therapy is effective for her. She does not need a refill. Asthma: Diagnosed as a child. Has albuterol on hand, last used inhaler many months ago. Overall she is controlled. Skin cancer: Saw Dr. Radha Oneal with [facility]. She was diagnosed 3 years ago when she had removal of the skin cancer from her right lower arm. She follows up yearly. Past Medical History:   Diagnosis Date    Acute coronary syndrome ([facility] [address] 06/06/2016    stent, right coronary artery, 99% occlusion.  Dr River Holman    Allergic rhinitis     Asthma     Bradycardia 1/18/2021    CAD (coronary artery disease)     Cancer ([facility] [address] 1986    cervical    Carpal tunnel syndrome     Dyslipidemia     mild    Headache Migraines    Hearing loss in right ear 2011    sudden right hearing loss associated with vertigo    History of coronary artery stent placement 2016    History of migraine headaches 2018    Hyperlipidemia LDL goal <100 2016    Impingement syndrome of shoulder, bursitis, left shoulder 2010    Mild intermittent asthma without complication     Squamous acanthoma of face 2021     Past Surgical History:   Procedure Laterality Date    ENDOSCOPY, COLON, DIAGNOSTIC      HX  SECTION      X2    HX COLONOSCOPY  ,     HX CORONARY STENT PLACEMENT  16    3.0 x 18 mm Xience stent and 3.0 x 8 mm Xience stent    HX HEART CATHETERIZATION  16    99% RCA    HX HYSTERECTOMY      for cervical cancer; pelvic exams Marjan Barraza    HX ORTHOPAEDIC      left knee surgery arthroscopy    HX ORTHOPAEDIC      carpal tunnel left     HX PELVIC LAPAROSCOPY      X2    HX TONSILLECTOMY       Current Outpatient Medications   Medication Sig    cyclobenzaprine (FLEXERIL) 10 mg tablet Take 0.5 Tablets by mouth three (3) times daily as needed for Muscle Spasm(s).  nitroglycerin (NITROSTAT) 0.4 mg SL tablet 1 Tablet by SubLINGual route as needed for Chest Pain.  albuterol (PROVENTIL HFA, VENTOLIN HFA, PROAIR HFA) 90 mcg/actuation inhaler 1-2 puffs every 6 hours as needed    SUMAtriptan (IMITREX) 100 mg tablet Take 1 Tab by mouth daily as needed for Migraine.  atorvastatin (LIPITOR) 40 mg tablet take 1 tablet by mouth nightly    clopidogreL (PLAVIX) 75 mg tab take 1 tablet once daily    metoprolol tartrate (LOPRESSOR) 25 mg tablet take 1/2 tablet by mouth every 12 hours    aspirin 81 mg chewable tablet Take 1 Tab by mouth daily.  multivitamin (ONE A DAY) tablet Take 1 Tab by mouth daily.  CALCIUM CARBONATE (CALTRATE 600 PO) Take 1 Tab by mouth two (2) times a day. No current facility-administered medications for this visit.      Allergies and Intolerances:   No Known Allergies  Family History:   Family History   Problem Relation Age of Onset    Cancer Mother     Heart Disease Father     Cancer Father         lung ca    Migraines Father     Cancer Other     Diabetes Maternal Aunt     Heart Disease Paternal Uncle     Heart Disease Paternal Uncle     Heart Disease Maternal Grandfather     Heart Disease Paternal Grandfather     Hypertension Maternal Grandmother      Social History:   She  reports that she has never smoked. She has never used smokeless tobacco.   Social History     Substance and Sexual Activity   Alcohol Use Yes    Comment: an ocassional glass of wine; marimar (maybe 5-6 a year)     Immunization History:  Immunization History   Administered Date(s) Administered    Covid-19, MODERNA, Mrna, Lnp-s, Pf, 100mcg/0.5mL 03/04/2021, 04/01/2021    Influenza Vaccine (Tri) Adjuvanted (>65 Yrs FLUAD TRI [id number]) 12/05/2018, 12/11/2019    Influenza Vaccine Whole 12/12/2003    Influenza, Quadrivalent, Adjuvanted (>65 Yrs FLUAD QUAD [id number]) 09/22/2020    Pneumococcal Conjugate (PCV-13) 12/11/2019, 09/30/2020    Pneumococcal Polysaccharide (PPSV-23) 07/16/2021    TB Skin Test (PPD) Intradermal 08/27/2019    TD Vaccine 05/05/2006    Zoster Vaccine, Live 10/16/2015       Review of Systems:   As above included in HPI. Otherwise 11 point review of systems negative including constitutional, skin, HENT, eyes, respiratory, cardiovascular, gastrointestinal, genitourinary, musculoskeletal, endocrine, hematologic, allergy, and neurologic. Physical:   Visit Vitals  /60   Pulse (!) 55   Temp 97.6 °F (36.4 °C) (Temporal)   Resp 14   Ht 5' 1\" (1.549 m)   Wt 105 lb 9.6 oz (47.9 kg)   SpO2 95%   BMI 19.95 kg/m²      Wt Readings from Last 3 Encounters:   07/16/21 105 lb 9.6 oz (47.9 kg)   04/29/21 106 lb (48.1 kg)   03/18/21 106 lb (48.1 kg)         Exam:   Physical Exam  Constitutional:       Appearance: Normal appearance. HENT:      Head: Normocephalic and atraumatic.       Right Ear: Tympanic membrane, ear canal and external ear normal.      Left Ear: Tympanic membrane, ear canal and external ear normal.      Mouth/Throat:      Mouth: Mucous membranes are moist.   Eyes:      Extraocular Movements: Extraocular movements intact. Conjunctiva/sclera: Conjunctivae normal.   Cardiovascular:      Rate and Rhythm: Normal rate and regular rhythm. Pulses: Normal pulses. Heart sounds: Normal heart sounds. Comments: No edema noted to aly LEs  Pulmonary:      Effort: Pulmonary effort is normal. No respiratory distress. Breath sounds: Normal breath sounds. No wheezing. Abdominal:      General: Abdomen is flat. Bowel sounds are normal.      Palpations: Abdomen is soft. Musculoskeletal:         General: Normal range of motion. Cervical back: Normal range of motion. Comments: Gait stable   Skin:     General: Skin is warm and dry. Comments: Healed scar noted to right forearm-removal skin cancer. Neurological:      General: No focal deficit present. Mental Status: She is alert and oriented to person, place, and time. Psychiatric:         Mood and Affect: Mood normal.         Behavior: Behavior normal.        Review of Data:  Labs reviewed: n/a      Impression:  Patient Active Problem List   Diagnosis Code    Allergic rhinitis 80    Hearing loss in right ear H91.91    Hyperlipidemia LDL goal <100 E78.5    History of coronary artery stent placement Z95.5    Coronary artery disease involving native coronary artery of native heart without angina pectoris I25.10    History of migraine headaches Z86.69    Bradycardia R00.1    Mild intermittent asthma without complication P99.17    Squamous acanthoma of face D23.30       Plan:    ICD-10-CM ICD-9-CM    1. Medicare annual wellness visit, subsequent  Z00.00 V70.0    2. Advanced directives, counseling/discussion  Z71.89 V65.49 ADVANCE CARE PLANNING FIRST 30 MINS      FULL CODE   3.  Encounter for immunization  Z23 V03.89 PNEUMOCOCCAL POLYSACCHARIDE VACCINE, 23-VALENT, ADULT OR IMMUNOSUPPRESSED PT DOSE,      ADMIN PNEUMOCOCCAL VACCINE   4. Hyperlipidemia LDL goal <100  E78.5 272.4 LIPID PANEL   5. Bradycardia  N19.7 690.84 METABOLIC PANEL, COMPREHENSIVE   6. Coronary artery disease involving native coronary artery of native heart without angina pectoris  I25.10 414.01    7. Mild intermittent asthma without complication  G49.03 027.66    8. History of migraine headaches  Z86.69 V12.49      -[facility]/ACP  [facility] Wellness: Reviewed all HM and ordered tests/imaging appropriately. Reviewed care teams and medications with updates. Advanced Care Planning: Patient educated on the importance of an advanced care plan and paperwork was given to the patient today. Asked patient to read over the information and bring back at next appointment.    -Hyperlipidemia LDL goal <100  Continue atorvastatin therapy  Lipid level 6 months    -Coronary artery disease involving native coronary artery of native heart without angina pectoris  Continue Plavix and aspirin therapy     -History of coronary artery stent placement  Specialist managed condition is being evaluated/managed by Dr. Dov Jacobsen. No acute findings today meriting change in management plan. -Bradycardia  After reviewing patient's past history of blood pressures and heart rate. Noted patient to range 46-73 with her heart rate. Her EKG obtained via her cardiologist in November 2020 revealed sinus bradycardia. She continues the metoprolol 25 mg one half tab twice daily as prescribed by her cardiologist.  She does have symptoms at times such as dizziness but she will rest and recover very quickly. Patient to follow-up with her new cardiologist Dr. Dov Jacobsen as instructed. -Coronary artery disease involving native coronary artery of native heart without angina pectoris  Specialist managed condition is being evaluated/managed by Dr. Dov Jacobsen. No acute findings today meriting change in management plan.       -History of migraine headaches  Continue Imitrex as needed    -Mild intermittent asthma without complication  Continue albuterol as needed    -Squamous acanthoma of face  Specialist managed condition is being evaluated/managed by Dr. Carl Motta. No acute findings today meriting change in management plan.         Follow up 6 months  Labs needed 1 week prior to appt: yes  Lipid, CMP    Dr. Yaquelin Tom, AGNP-C, DNP  [facility] [address] [place] [address]

## 2021-07-16 NOTE — PROGRESS NOTES
Chief Complaint   Patient presents with   [facility] Annual Wellness Visit       1. Have you been to the ER, urgent care clinic since your last visit? Hospitalized since your last visit? No.    2. Have you seen or consulted any other health care providers outside of the [address] [place] [address] since your last visit? Include any pap smears or colon screening.   No.

## 2021-07-16 NOTE — PROGRESS NOTES
Clive Avilez 1955 female who presents for routine immunizations. Patient denies any symptoms , reactions or allergies that would exclude them from being immunized today. Risks and adverse reactions were discussed and the VIS was given to them. All questions were addressed. Order placed for PPSV23 in left deltoid, per Verbal Order from Amelie Bird, [address], with read back. Patient declined observation.     Jeremiah Valderrama CMA

## 2021-07-16 NOTE — PROGRESS NOTES
This is the Subsequent Medicare Annual Wellness Exam, performed 12 months or more after the Initial AWV or the last Subsequent AWV    I have reviewed the patient's medical history in detail and updated the computerized patient record. Assessment/Plan   Education and counseling provided:  Are appropriate based on today's review and evaluation  Screening Mammography  Screening Pap and pelvic (covered once every 2 years)  Colorectal cancer screening tests  Shingrix vaccine, Overall pt is doing well. 1. Medicare annual wellness visit, subsequent  2. Advanced directives, counseling/discussion  -     ADVANCE CARE PLANNING FIRST 30 MINS  -     FULL CODE  3.  Encounter for immunization  -     PNEUMOCOCCAL POLYSACCHARIDE VACCINE, 23-VALENT, ADULT OR IMMUNOSUPPRESSED PT DOSE,  -     ADMIN PNEUMOCOCCAL VACCINE       Depression Risk Factor Screening     3 most recent PHQ Screens 7/16/2021   Little interest or pleasure in doing things Not at all   Feeling down, depressed, irritable, or hopeless Not at all   Total Score PHQ 2 0   Trouble falling or staying asleep, or sleeping too much Not at all   Feeling tired or having little energy Not at all   Poor appetite, weight loss, or overeating Not at all   Feeling bad about yourself - or that you are a failure or have let yourself or your family down Not at all   Trouble concentrating on things such as school, work, reading, or watching TV Not at all   Moving or speaking so slowly that other people could have noticed; or the opposite being so fidgety that others notice Not at all   Thoughts of being better off dead, or hurting yourself in some way Not at all   PHQ 9 Score 0   How difficult have these problems made it for you to do your work, take care of your home and get along with others Not difficult at all       Alcohol Risk Screen    Do you average more than 1 drink per night or more than 7 drinks a week:  No    On any one occasion in the past three months have you have had more than 3 drinks containing alcohol:  No        Functional Ability and Level of Safety    Hearing: Hearing is good. Activities of Daily Living: The home contains: no safety equipment. Patient does total self care      Ambulation: with no difficulty     Fall Risk:  Fall Risk Assessment, last 12 mths 7/16/2021   Able to walk? Yes   Fall in past 12 months? 0   Do you feel unsteady? 0   Are you worried about falling 0      Abuse Screen:  Patient is not abused       Cognitive Screening    Has your family/caregiver stated any concerns about your memory: no     Cognitive Screening: Normal - Clock Drawing Test    Health Maintenance Due     Health Maintenance Due   Topic Date Due    Shingrix Vaccine Age 49> (1 of 2) Never done       Patient Care Team   Patient Care Team:  Nancy Trevino DNP as PCP - General (Nurse Practitioner)  Nancy Trevino DNP as PCP - [facility]  Reji Kilpatrick NP (Nurse Practitioner)  Tomas Cortes MD (Cardiology)  Idalmis Graff MD (Dermatology)    History     Patient Active Problem List   Diagnosis Code    Allergic rhinitis 80    Hearing loss in right ear H91.91    Hyperlipidemia LDL goal <100 E78.5    History of coronary artery stent placement Z95.5    CAD (coronary artery disease) I25.10    History of migraine headaches Z86.69    Bradycardia R00.1    Mild intermittent asthma without complication N88.37    Squamous acanthoma of face D23.30     Past Medical History:   Diagnosis Date    Acute coronary syndrome ([address] 06/06/2016    stent, right coronary artery, 99% occlusion.  Dr Steph Clements    Allergic rhinitis     Asthma     Bradycardia 1/18/2021    CAD (coronary artery disease)     Cancer ([address] 1986    cervical    Carpal tunnel syndrome     Dyslipidemia     mild    Headache Migraines    Hearing loss in right ear 2011    sudden right hearing loss associated with vertigo    History of coronary artery stent placement 6/17/2016  History of migraine headaches 2018    Hyperlipidemia LDL goal <100 2016    Impingement syndrome of shoulder, bursitis, left shoulder 2010    Mild intermittent asthma without complication     Squamous acanthoma of face 2021      Past Surgical History:   Procedure Laterality Date    ENDOSCOPY, COLON, DIAGNOSTIC  2007    HX  SECTION      X2    HX COLONOSCOPY  ,     HX CORONARY STENT PLACEMENT  16    3.0 x 18 mm Xience stent and 3.0 x 8 mm Xience stent    HX HEART CATHETERIZATION  16    99% RCA    HX HYSTERECTOMY      for cervical cancer; pelvic exams Marjan Barraza    HX ORTHOPAEDIC      left knee surgery arthroscopy    HX ORTHOPAEDIC      carpal tunnel left     HX PELVIC LAPAROSCOPY      X2    HX TONSILLECTOMY       Current Outpatient Medications   Medication Sig Dispense Refill    cyclobenzaprine (FLEXERIL) 10 mg tablet Take 0.5 Tablets by mouth three (3) times daily as needed for Muscle Spasm(s). 30 Tablet 0    nitroglycerin (NITROSTAT) 0.4 mg SL tablet 1 Tablet by SubLINGual route as needed for Chest Pain. 1 Bottle 3    albuterol (PROVENTIL HFA, VENTOLIN HFA, PROAIR HFA) 90 mcg/actuation inhaler 1-2 puffs every 6 hours as needed 1 Inhaler 2    SUMAtriptan (IMITREX) 100 mg tablet Take 1 Tab by mouth daily as needed for Migraine. 9 Tab 5    atorvastatin (LIPITOR) 40 mg tablet take 1 tablet by mouth nightly 90 Tab 3    clopidogreL (PLAVIX) 75 mg tab take 1 tablet once daily 90 Tab 4    metoprolol tartrate (LOPRESSOR) 25 mg tablet take 1/2 tablet by mouth every 12 hours 90 Tab 4    aspirin 81 mg chewable tablet Take 1 Tab by mouth daily. 30 Tab 1    multivitamin (ONE A DAY) tablet Take 1 Tab by mouth daily.  CALCIUM CARBONATE (CALTRATE 600 PO) Take 1 Tab by mouth two (2) times a day.        No Known Allergies    Family History   Problem Relation Age of Onset    Cancer Mother     Heart Disease Father     Cancer Father         lung ca    Migraines Father     Cancer Other     Diabetes Maternal Aunt     Heart Disease Paternal Uncle     Heart Disease Paternal Uncle     Heart Disease Maternal Grandfather     Heart Disease Paternal Grandfather     Hypertension Maternal Grandmother      Social History     Tobacco Use    Smoking status: Never Smoker    Smokeless tobacco: Never Used   Substance Use Topics    Alcohol use: Yes     Comment: an ocassional glass of wine; Cephus Severs (maybe 5-6 a year)         [place], [facility]

## 2021-07-19 ENCOUNTER — TELEPHONE (OUTPATIENT)
Dept: INTERNAL MEDICINE CLINIC | Age: 66
End: 2021-07-19

## 2021-07-21 ENCOUNTER — TELEPHONE (OUTPATIENT)
Dept: INTERNAL MEDICINE CLINIC | Age: 66
End: 2021-07-21

## 2021-07-21 NOTE — TELEPHONE ENCOUNTER
Patient stating she has been seeing [employer] advertised to boost memory. She wants to know if she can take that with the meds she is currently on.

## 2021-09-20 RX ORDER — METOPROLOL TARTRATE 25 MG/1
TABLET, FILM COATED ORAL
Qty: 90 TABLET | Refills: 3 | Status: SHIPPED | OUTPATIENT
Start: 2021-09-20 | End: 2022-10-04

## 2021-09-20 RX ORDER — CLOPIDOGREL BISULFATE 75 MG/1
TABLET ORAL
Qty: 90 TABLET | Refills: 3 | Status: SHIPPED | OUTPATIENT
Start: 2021-09-20 | End: 2022-10-04

## 2021-10-27 ENCOUNTER — TRANSCRIBE ORDER (OUTPATIENT)
Dept: SCHEDULING | Age: 66
End: 2021-10-27

## 2021-10-27 DIAGNOSIS — Z13.820 OSTEOPOROSIS SCREENING: Primary | ICD-10-CM

## 2021-10-29 ENCOUNTER — TRANSCRIBE ORDER (OUTPATIENT)
Dept: SCHEDULING | Age: 66
End: 2021-10-29

## 2021-10-29 DIAGNOSIS — Z01.419 WELL WOMAN EXAM: Primary | ICD-10-CM

## 2021-11-09 ENCOUNTER — OFFICE VISIT (OUTPATIENT)
Dept: CARDIOLOGY CLINIC | Age: 66
End: 2021-11-09
Payer: MEDICARE

## 2021-11-09 VITALS
DIASTOLIC BLOOD PRESSURE: 60 MMHG | HEART RATE: 52 BPM | BODY MASS INDEX: 19.45 KG/M2 | WEIGHT: 103 LBS | SYSTOLIC BLOOD PRESSURE: 124 MMHG | HEIGHT: 61 IN | OXYGEN SATURATION: 97 %

## 2021-11-09 DIAGNOSIS — I25.10 CORONARY ARTERY DISEASE INVOLVING NATIVE CORONARY ARTERY OF NATIVE HEART WITHOUT ANGINA PECTORIS: Primary | ICD-10-CM

## 2021-11-09 DIAGNOSIS — E78.5 DYSLIPIDEMIA: ICD-10-CM

## 2021-11-09 PROCEDURE — G8536 NO DOC ELDER MAL SCRN: HCPCS | Performed by: INTERNAL MEDICINE

## 2021-11-09 PROCEDURE — G8420 CALC BMI NORM PARAMETERS: HCPCS | Performed by: INTERNAL MEDICINE

## 2021-11-09 PROCEDURE — 1101F PT FALLS ASSESS-DOCD LE1/YR: CPT | Performed by: INTERNAL MEDICINE

## 2021-11-09 PROCEDURE — G8510 SCR DEP NEG, NO PLAN REQD: HCPCS | Performed by: INTERNAL MEDICINE

## 2021-11-09 PROCEDURE — 99214 OFFICE O/P EST MOD 30 MIN: CPT | Performed by: INTERNAL MEDICINE

## 2021-11-09 PROCEDURE — G8399 PT W/DXA RESULTS DOCUMENT: HCPCS | Performed by: INTERNAL MEDICINE

## 2021-11-09 PROCEDURE — 1090F PRES/ABSN URINE INCON ASSESS: CPT | Performed by: INTERNAL MEDICINE

## 2021-11-09 PROCEDURE — 3017F COLORECTAL CA SCREEN DOC REV: CPT | Performed by: INTERNAL MEDICINE

## 2021-11-09 PROCEDURE — 93000 ELECTROCARDIOGRAM COMPLETE: CPT | Performed by: INTERNAL MEDICINE

## 2021-11-09 PROCEDURE — G8427 DOCREV CUR MEDS BY ELIG CLIN: HCPCS | Performed by: INTERNAL MEDICINE

## 2021-11-09 NOTE — PROGRESS NOTES
Abisai Vazquez presents today for   Chief Complaint   Patient presents with    Follow-up     1 year  - no cardiac complaints        Abisai Vazquez preferred language for health care discussion is english/other. Is someone accompanying this pt? no    Is the patient using any DME equipment during [address]? no    Depression Screening:  3 most recent PHQ Screens 11/9/2021   Little interest or pleasure in doing things Not at all   Feeling down, depressed, irritable, or hopeless Not at all   Total Score PHQ 2 0   Trouble falling or staying asleep, or sleeping too much -   Feeling tired or having little energy -   Poor appetite, weight loss, or overeating -   Feeling bad about yourself - or that you are a failure or have let yourself or your family down -   Trouble concentrating on things such as school, work, reading, or watching TV -   Moving or speaking so slowly that other people could have noticed; or the opposite being so fidgety that others notice -   Thoughts of being better off dead, or hurting yourself in some way -   PHQ 9 Score -   How difficult have these problems made it for you to do your work, take care of your home and get along with others -       Learning Assessment:  Learning Assessment 8/10/2016   PRIMARY LEARNER Patient   PRIMARY LANGUAGE ENGLISH   LEARNER PREFERENCE PRIMARY DEMONSTRATION   ANSWERED BY patient   RELATIONSHIP SELF       Abuse Screening:  Abuse Screening Questionnaire 7/16/2021   Do you ever feel afraid of your partner? N   Are you in a relationship with someone who physically or mentally threatens you? N   Is it safe for you to go home? Y       Fall Risk  Fall Risk Assessment, last 12 mths 11/9/2021   Able to walk? Yes   Fall in past 12 months? -   Do you feel unsteady? -   Are you worried about falling -       Pt currently taking Anticoagulant therapy? ASA 81mg every day and Plavix     Coordination of Care:  1. Have you been to the ER, urgent care clinic since your last visit? Hospitalized since your last visit? no    2. Have you seen or consulted any other health care providers outside of the [address] [place] [address] since your last visit? Include any pap smears or colon screening.  no

## 2021-11-20 NOTE — PROGRESS NOTES
Abida Casanova is in the office today for cardiovascular evaluation of her chronic coronary artery disease. She is a 78-year-old woman who experienced exertional chest discomfort in 2016, which with increasing symptoms leading to a hospitalization in June of 2016. She ultimately required cardiac catheterization which I did with the findings of:      1. Patent left main trunk. 2.  30% mid LAD obstruction. 3.  Very small angiographically normal ramus intermediate range. 4.  Diagonal branches were angiographically normal.  5.  20% smooth mid circumflex obstruction with patent obtuse marginal branches. 6.  99% stenosis at the junction of the mid and distal third of the vessel of TIMI2 flow.      The patient subsequently had stenting to RCA with  3.0 x 18 mm and 3.0 x 8 mm Xience stents with a favorable result. A nuclear stress test was done in June of 2020 and that was read as a low risk study with an ejection fraction greater than 80% and normal perfusion of the heart. In the office today the patient reports she is doing \"great \". She completely changed her diet. She does not eat any fast food. She has not been exercising as much as she would like but is trying to do some walking. Encounter Diagnoses   Name Primary?  Coronary artery disease involving native coronary artery of native heart without angina pectoris Yes    Dyslipidemia        Plan; the patient appears to have stable symptoms at this time. She has had no recent chest pain. We will plan to discontinue the Plavix. The most recent profile have available to me was done in January 2021. Her total cholesterol is 172. HDL was 75. LDL was 81 and triglycerides were 80. Plan to continue her Lipitor at 40 mg daily. Her blood pressure is well controlled in the office today. Plan to see her back in 1 year.     PCP: Miriam Pendleton DNP      Past Medical History:   Diagnosis Date    Acute coronary syndrome ([facility] [address] 06/06/2016    stent, right coronary artery, 99% occlusion. Dr Jeffrey Eddy    Allergic rhinitis     Asthma     Bradycardia 2021    CAD (coronary artery disease)     Cancer ([facility] [address]     cervical    Carpal tunnel syndrome     Dyslipidemia     mild    Headache Migraines    Hearing loss in right ear     sudden right hearing loss associated with vertigo    History of coronary artery stent placement 2016    History of migraine headaches 2018    Hyperlipidemia LDL goal <100 2016    Impingement syndrome of shoulder, bursitis, left shoulder 2010    Mild intermittent asthma without complication     Squamous acanthoma of face 2021       Past Surgical History:   Procedure Laterality Date    ENDOSCOPY, COLON, DIAGNOSTIC      HX  SECTION      X2    HX COLONOSCOPY  ,     HX CORONARY STENT PLACEMENT  16    3.0 x 18 mm Xience stent and 3.0 x 8 mm Xience stent    HX HEART CATHETERIZATION  16    99% RCA    HX HYSTERECTOMY      for cervical cancer; pelvic exams Marjan Barraza    HX ORTHOPAEDIC      left knee surgery arthroscopy    HX ORTHOPAEDIC      carpal tunnel left     HX PELVIC LAPAROSCOPY      X2    HX TONSILLECTOMY       Current Outpatient Medications   Medication Sig    clopidogreL (PLAVIX) 75 mg tab take 1 tablet once daily    metoprolol tartrate (LOPRESSOR) 25 mg tablet take 1/2 tablet by mouth every 12 hours    cyclobenzaprine (FLEXERIL) 10 mg tablet Take 0.5 Tablets by mouth three (3) times daily as needed for Muscle Spasm(s).  nitroglycerin (NITROSTAT) 0.4 mg SL tablet 1 Tablet by SubLINGual route as needed for Chest Pain.  albuterol (PROVENTIL HFA, VENTOLIN HFA, PROAIR HFA) 90 mcg/actuation inhaler 1-2 puffs every 6 hours as needed    SUMAtriptan (IMITREX) 100 mg tablet Take 1 Tab by mouth daily as needed for Migraine.     atorvastatin (LIPITOR) 40 mg tablet take 1 tablet by mouth nightly    aspirin 81 mg chewable tablet Take 1 Tab by mouth daily.    multivitamin (ONE A DAY) tablet Take 1 Tab by mouth daily.  CALCIUM CARBONATE (CALTRATE 600 PO) Take 1 Tab by mouth two (2) times a day. No current facility-administered medications for this visit. Allergies   Allergen Reactions    Hay Fever And Allergy Relief Cough, Drowsiness, Runny Nose, Shortness of Breath and Sneezing       Social History:   Social History     Tobacco Use    Smoking status: Never Smoker    Smokeless tobacco: Never Used   Substance Use Topics    Alcohol use: Yes     Comment: an ocassional glass of wine; marimar (maybe 5-6 a year)         Family history: family history includes Cancer in her father, mother, and another family member; Diabetes in her maternal aunt; Heart Disease in her father, maternal grandfather, paternal grandfather, paternal uncle, and paternal uncle; Hypertension in her maternal grandmother; Migraines in her father. Review of Systems:    Constitutional: Negative . Respiratory: Negative. Cardiovascular: Negative  Gastrointestinal: Negative. Musculoskeletal: Negative. Neurological: Negative. Physical Exam:   The patient is an alert and oriented  Visit Vitals  /60 (BP 1 Location: Left upper arm, BP Patient Position: Sitting, BP Cuff Size: Adult)   Pulse (!) 52   Ht 5' 1\" (1.549 m)   Wt 46.7 kg (103 lb)   SpO2 97%   BMI 19.46 kg/m²      BP Readings from Last 3 Encounters:   11/09/21 124/60   07/16/21 102/60   01/18/21 110/63      Wt Readings from Last 3 Encounters:   11/09/21 46.7 kg (103 lb)   07/16/21 47.9 kg (105 lb 9.6 oz)   04/29/21 48.1 kg (106 lb)     HEENT: Conjuctiva white and mucosa moist  NECK: Supple without masses, tenderness or thyromegaly. There was no jugular venous distention. Carotids are  without bruits. CHEST: Symmetrical with good excursion. LUNGS: Clear to auscultation in all fields. HEART: Regular rate and rhythm. . There is a normal S1 and S2.   No murmurs, rubs, clicks, or gallops   ABDOMEN: Soft without masses, tenderness or organomegaly. EXTREMITIES: Full peripheral pulses without peripheral edema. INTEGUMENT: Skin is warm and dry   NEUROLOGICAL: The patient was oriented x3 with motor function grossly intact. Review of Data: See PMH and Cardiology and Imaging sections for cardiac testing  Lab Results   Component Value Date/Time    Cholesterol, total 172 01/11/2021 08:24 AM    HDL Cholesterol 75 (H) 01/11/2021 08:24 AM    LDL, calculated 81 01/11/2021 08:24 AM    Triglyceride 80 01/11/2021 08:24 AM    CHOL/HDL Ratio 2.3 01/11/2021 08:24 AM       Results for orders placed or performed in visit on 11/9/2021   AMB POC EKG ROUTINE W/ 12 LEADS, INTER & REP     Status: None    Narrative    Sinus bradycardia rate 52. No significant change compared to prior tracing           Guerda Alexander MD, F.A.C.C. [facility]  [facility]  [address] [place] [address]. [address] [facility]  [phone]    PLEASE NOTE:  This document has been produced using voice recognition software. Unrecognized errors in transcription may be present.

## 2021-12-06 RX ORDER — ATORVASTATIN CALCIUM 40 MG/1
40 TABLET, FILM COATED ORAL
Qty: 90 TABLET | Refills: 3 | Status: SHIPPED | OUTPATIENT
Start: 2021-12-06

## 2021-12-28 ENCOUNTER — APPOINTMENT (OUTPATIENT)
Dept: INTERNAL MEDICINE CLINIC | Age: 66
End: 2021-12-28

## 2021-12-28 ENCOUNTER — HOSPITAL ENCOUNTER (OUTPATIENT)
Dept: LAB | Age: 66
Discharge: HOME | End: 2021-12-28
Payer: MEDICARE

## 2021-12-28 DIAGNOSIS — R00.1 BRADYCARDIA: ICD-10-CM

## 2021-12-28 DIAGNOSIS — E78.5 HYPERLIPIDEMIA LDL GOAL <100: ICD-10-CM

## 2021-12-28 LAB
ALBUMIN SERPL-MCNC: 4.1 G/DL (ref 3.4–5)
ALBUMIN/GLOB SERPL: 1.6 {RATIO} (ref 0.8–1.7)
ALP SERPL-CCNC: 41 U/L (ref 45–117)
ALT SERPL-CCNC: 32 U/L (ref 13–56)
ANION GAP SERPL CALC-SCNC: 6 MMOL/L (ref 3–18)
AST SERPL-CCNC: 23 U/L (ref 10–38)
BILIRUB SERPL-MCNC: 0.4 MG/DL (ref 0.2–1)
BUN SERPL-MCNC: 11 MG/DL (ref 7–18)
BUN/CREAT SERPL: 13 (ref 12–20)
CALCIUM SERPL-MCNC: 8.9 MG/DL (ref 8.5–10.1)
CHLORIDE SERPL-SCNC: 102 MMOL/L (ref 100–111)
CHOLEST SERPL-MCNC: 159 MG/DL
CO2 SERPL-SCNC: 29 MMOL/L (ref 21–32)
CREAT SERPL-MCNC: 0.82 MG/DL (ref 0.6–1.3)
GLOBULIN SER CALC-MCNC: 2.5 G/DL (ref 2–4)
GLUCOSE SERPL-MCNC: 94 MG/DL (ref 74–99)
HDLC SERPL-MCNC: 81 MG/DL (ref 40–60)
HDLC SERPL: 2 {RATIO} (ref 0–5)
LDLC SERPL CALC-MCNC: 64.4 MG/DL (ref 0–100)
LIPID PROFILE,FLP: ABNORMAL
POTASSIUM SERPL-SCNC: 4.1 MMOL/L (ref 3.5–5.5)
PROT SERPL-MCNC: 6.6 G/DL (ref 6.4–8.2)
SODIUM SERPL-SCNC: 137 MMOL/L (ref 136–145)
TRIGL SERPL-MCNC: 68 MG/DL (ref ?–150)
VLDLC SERPL CALC-MCNC: 13.6 MG/DL

## 2021-12-28 PROCEDURE — 80053 COMPREHEN METABOLIC PANEL: CPT

## 2021-12-28 PROCEDURE — 36415 COLL VENOUS BLD VENIPUNCTURE: CPT

## 2021-12-28 PROCEDURE — 80061 LIPID PANEL: CPT

## 2022-01-31 DIAGNOSIS — Z86.69 HISTORY OF MIGRAINE HEADACHES: ICD-10-CM

## 2022-01-31 RX ORDER — SUMATRIPTAN 100 MG/1
100 TABLET, FILM COATED ORAL
Qty: 9 TABLET | Refills: 5 | Status: SHIPPED | OUTPATIENT
Start: 2022-01-31

## 2022-01-31 NOTE — TELEPHONE ENCOUNTER
Last Visit: 7/16/21 with DARRICK Goss  Next Appointment: 2/2/22 with DARRICK Goss  Previous Refill Encounter(s): 1/18/21 #9 with 5 refills    Requested Prescriptions     Pending Prescriptions Disp Refills    SUMAtriptan (IMITREX) 100 mg tablet 9 Tablet 5     Sig: Take 1 Tablet by mouth daily as needed for Migraine.

## 2022-02-02 ENCOUNTER — OFFICE VISIT (OUTPATIENT)
Dept: INTERNAL MEDICINE CLINIC | Age: 67
End: 2022-02-02
Payer: MEDICARE

## 2022-02-02 ENCOUNTER — TELEPHONE (OUTPATIENT)
Dept: INTERNAL MEDICINE CLINIC | Age: 67
End: 2022-02-02

## 2022-02-02 VITALS
OXYGEN SATURATION: 98 % | HEIGHT: 61 IN | TEMPERATURE: 97 F | SYSTOLIC BLOOD PRESSURE: 97 MMHG | WEIGHT: 96.2 LBS | BODY MASS INDEX: 18.16 KG/M2 | DIASTOLIC BLOOD PRESSURE: 54 MMHG | HEART RATE: 67 BPM | RESPIRATION RATE: 14 BRPM

## 2022-02-02 DIAGNOSIS — Z86.69 HISTORY OF MIGRAINE HEADACHES: ICD-10-CM

## 2022-02-02 DIAGNOSIS — E78.5 HYPERLIPIDEMIA LDL GOAL <100: ICD-10-CM

## 2022-02-02 DIAGNOSIS — J45.20 MILD INTERMITTENT ASTHMA WITHOUT COMPLICATION: Primary | ICD-10-CM

## 2022-02-02 PROCEDURE — G0463 HOSPITAL OUTPT CLINIC VISIT: HCPCS | Performed by: NURSE PRACTITIONER

## 2022-02-02 PROCEDURE — G8427 DOCREV CUR MEDS BY ELIG CLIN: HCPCS | Performed by: NURSE PRACTITIONER

## 2022-02-02 PROCEDURE — 1090F PRES/ABSN URINE INCON ASSESS: CPT | Performed by: NURSE PRACTITIONER

## 2022-02-02 PROCEDURE — G8419 CALC BMI OUT NRM PARAM NOF/U: HCPCS | Performed by: NURSE PRACTITIONER

## 2022-02-02 PROCEDURE — 99213 OFFICE O/P EST LOW 20 MIN: CPT | Performed by: NURSE PRACTITIONER

## 2022-02-02 PROCEDURE — G8399 PT W/DXA RESULTS DOCUMENT: HCPCS | Performed by: NURSE PRACTITIONER

## 2022-02-02 PROCEDURE — 3017F COLORECTAL CA SCREEN DOC REV: CPT | Performed by: NURSE PRACTITIONER

## 2022-02-02 PROCEDURE — G8432 DEP SCR NOT DOC, RNG: HCPCS | Performed by: NURSE PRACTITIONER

## 2022-02-02 PROCEDURE — 1101F PT FALLS ASSESS-DOCD LE1/YR: CPT | Performed by: NURSE PRACTITIONER

## 2022-02-02 PROCEDURE — G8536 NO DOC ELDER MAL SCRN: HCPCS | Performed by: NURSE PRACTITIONER

## 2022-02-02 NOTE — PROGRESS NOTES
[facility] [address]   [place], [address]  [phone] [facility]/[phone] fax    2022    Rakel Birch 1955 is a pleasant [address] [facility] female. Patient is  and lives alone. 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 She recently completed her short-term  position with [employer]. Past Medical History:   Diagnosis Date    Acute coronary syndrome ([facility] [address] 2016    stent, right coronary artery, 99% occlusion. Dr Gwyn Rodriguez    Allergic rhinitis     Asthma     Bradycardia 2021    CAD (coronary artery disease)     Cancer ([facility] [address]     cervical    Carpal tunnel syndrome     Dyslipidemia     mild    Headache Migraines    Hearing loss in right ear     sudden right hearing loss associated with vertigo    History of coronary artery stent placement 2016    History of migraine headaches 2018    Hyperlipidemia LDL goal <100 2016    Impingement syndrome of shoulder, bursitis, left shoulder 2010    Mild intermittent asthma without complication     Squamous acanthoma of face 2021     Past Surgical History:   Procedure Laterality Date    ENDOSCOPY, COLON, DIAGNOSTIC      HX  SECTION      X2    HX COLONOSCOPY  ,     HX CORONARY STENT PLACEMENT  16    3.0 x 18 mm Xience stent and 3.0 x 8 mm Xience stent    HX HEART CATHETERIZATION  16    99% RCA    HX HYSTERECTOMY      for cervical cancer; pelvic exams Marjan Madi    HX ORTHOPAEDIC      left knee surgery arthroscopy    HX ORTHOPAEDIC      carpal tunnel left     HX PELVIC LAPAROSCOPY      X2    HX TONSILLECTOMY       Current Outpatient Medications   Medication Sig    albuterol (PROVENTIL HFA, VENTOLIN HFA, PROAIR HFA) 90 mcg/actuation inhaler Take 1 Puff by inhalation every six (6) hours as needed for Wheezing.  1-2 puffs every 6 hours as needed  Indications: asthma attack    SUMAtriptan (IMITREX) 100 mg tablet Take 1 Tablet by mouth daily as needed for Migraine.  atorvastatin (LIPITOR) 40 mg tablet Take 1 Tablet by mouth nightly.  clopidogreL (PLAVIX) 75 mg tab take 1 tablet once daily    metoprolol tartrate (LOPRESSOR) 25 mg tablet take 1/2 tablet by mouth every 12 hours    cyclobenzaprine (FLEXERIL) 10 mg tablet Take 0.5 Tablets by mouth three (3) times daily as needed for Muscle Spasm(s).  nitroglycerin (NITROSTAT) 0.4 mg SL tablet 1 Tablet by SubLINGual route as needed for Chest Pain.  aspirin 81 mg chewable tablet Take 1 Tab by mouth daily.  multivitamin (ONE A DAY) tablet Take 1 Tab by mouth daily.  CALCIUM CARBONATE (CALTRATE 600 PO) Take 1 Tab by mouth two (2) times a day. No current facility-administered medications for this visit. Allergies and Intolerances: Allergies   Allergen Reactions    Hay Fever And Allergy Relief Cough, Drowsiness, Runny Nose, Shortness of Breath and Sneezing     Family History:   Family History   Problem Relation Age of Onset    Cancer Mother     Heart Disease Father     Cancer Father         lung ca    Migraines Father     Cancer Other     Diabetes Maternal Aunt     Heart Disease Paternal Uncle     Heart Disease Paternal Uncle     Heart Disease Maternal Grandfather     Heart Disease Paternal Grandfather     Hypertension Maternal Grandmother      Social History:   She  reports that she has never smoked.  She has never used smokeless tobacco.   Social History     Substance and Sexual Activity   Alcohol Use Yes    Comment: an ocassional glass of wine; Anneliese Dougherty (maybe 5-6 a year)     Immunization History:  Immunization History   Administered Date(s) Administered    COVID-19, Moderna Booster, PF, 0.25mL Dose 10/29/2021    COVID-19Annika Arn, Primary or Immunocompromised Series, MRNA, PF, 100mcg/0.5mL 03/04/2021, 04/01/2021    Influenza Vaccine (Tri) Adjuvanted (>65 Yrs FLUAD TRI 22926) 12/05/2018, 12/11/2019    Influenza Vaccine Whole 12/12/2003    Influenza, Quadrivalent, Adjuvanted (>65 Yrs FLUAD QUAD [id number]) 09/22/2020    Pneumococcal Conjugate (PCV-13) 12/11/2019, 09/30/2020    Pneumococcal Polysaccharide (PPSV-23) 07/16/2021    TB Skin Test (PPD) Intradermal 08/27/2019    TD Vaccine 05/05/2006    Zoster Vaccine, Live 10/16/2015       Todays concerns:  None    Cardio: In 2016, 2 stents placed by Dr. Monica Solomon after experiencing chest pain with exertion and at rest.  At that time she was placed on metoprolol. She does have a history of low blood pressure. At times she will suffer from lightheadedness but she recovers quickly. Continues atorvastatin therapy. She follows up with cardio once a year. Migraines: first episode developed during adolescence around 6years old. Started treatment approximately 25 years ago when she was suffering with multiple episodes. She suffers with maybe 1-2 episodes in a 3-month timeframe and the intensity is much less. She will take Imitrex if her migraines occur, this therapy is effective for her. Asthma: Diagnosed as a child. Has albuterol on hand, last used inhaler many months ago. Overall she is controlled. Skin cancer: Was diagnosed 2019. Saw Dr. Stormy Cai with [facility] who removed skin cancer from her right lower arm. She follows up yearly. Patient denies chest pain, shortness of breath, fatigue, dizziness, edema, GI/ issues. Review of Systems:   As above included in HPI. Otherwise 11 point review of systems negative including constitutional, skin, HENT, eyes, respiratory, cardiovascular, gastrointestinal, genitourinary, musculoskeletal, endocrine, hematologic, allergy, and neurologic.       Review of Data:  LDL 64; TG 68  Kidneys/liver good    Physical:   Visit Vitals  BP (!) 97/54   Pulse 67   Temp 97 °F (36.1 °C) (Temporal)   Resp 14   Ht 5' 1\" (1.549 m)   Wt 96 lb 3.2 oz (43.6 kg)   SpO2 98%   BMI 18.18 kg/m²      Wt Readings from Last 3 Encounters:   02/02/22 96 lb 3.2 oz (43.6 kg)   11/09/21 103 lb (46.7 kg)   07/16/21 105 lb 9.6 oz (47.9 kg)       Exam:   Physical Exam  Constitutional:       Appearance: Normal appearance. HENT:      Head: Normocephalic and atraumatic. Right Ear: External ear normal.      Left Ear: External ear normal.   Eyes:      Extraocular Movements: Extraocular movements intact. Conjunctiva/sclera: Conjunctivae normal.   Neck:      Vascular: No carotid bruit. Cardiovascular:      Rate and Rhythm: Normal rate and regular rhythm. Pulses: Normal pulses. Heart sounds: Normal heart sounds. Comments: No edema noted to aly LEs  Pulmonary:      Effort: Pulmonary effort is normal. No respiratory distress. Breath sounds: Normal breath sounds. No wheezing. Musculoskeletal:         General: Normal range of motion. Cervical back: Normal range of motion. Comments: Gait stable, no limitations noted. Skin:     General: Skin is warm and dry. Neurological:      General: No focal deficit present. Mental Status: She is alert and oriented to person, place, and time. Psychiatric:         Mood and Affect: Mood normal.         Behavior: Behavior normal.        Body mass index is 18.18 kg/m². Plan:      ICD-10-CM ICD-9-CM    1. Mild intermittent asthma without complication  K23.93 326.08 albuterol (PROVENTIL HFA, VENTOLIN HFA, PROAIR HFA) 90 mcg/actuation inhaler   2. Hyperlipidemia LDL goal <100  E78.5 272.4    3. History of migraine headaches  Z86.69 V12.49      -Intermittent asthma   Continue albuterol as needed    -Hyperlipidemia  TG 68; LDL 64  continue, Continue Lipitor 40 mg one half tab daily  Reduce fried fatty or oily foods in diet  Limit red meats, processed foods, and alcohol.     Limit fast food  Work on weight reduction  Increase water intake    -History of migraines  Continue Imitrex as needed    -History of coronary artery stent placement  Specialist managed condition is being evaluated/managed by Dr. Leandra Aquino. No acute findings today meriting change in management plan. -Bradycardia  Patient is average heart rate 46-73. EKG obtained via her cardiologist in November 2020 revealed sinus bradycardia. She continues the metoprolol 25 mg one half tab twice daily as prescribed by her cardiologist.      -Coronary artery disease involving native coronary artery of native heart without angina pectoris  Continue Plavix and aspirin therapy   Specialist managed condition is being evaluated/managed by Dr. Leandra Aquino. No acute findings today meriting change in management plan. -Squamous acanthoma of face  Specialist managed condition is being evaluated/managed by Dr. Katina Fink. No acute findings today meriting change in management plan. Reviewed medication and completed medication reconciliation with the patient. Reviewed side effects of medications with the patient. Questions were answered and patient verb understanding. Follow up 6 months AWV no labs      Dr. Altagracia Coker, JOEYP-C, DNP  [facility]     The total time 25 minutes. At least 50% of that time was spent in counseling and/or coordination of care. My summary of patient counseling and coordination of care includes reviewing medical record, assessing patient, placing orders, and discussing plan of care with patient.

## 2022-02-02 NOTE — PROGRESS NOTES
Chief Complaint   Patient presents with    Follow-up     6 months     1. \"Have you been to the ER, urgent care clinic since your last visit? Hospitalized since your last visit? \" No    2. \"Have you seen or consulted any other health care providers outside of the [address] [place] [address] since your last visit? \" No     3. For patients aged 39-70: Has the patient had a colonoscopy / FIT/ Cologuard? Yes - no Care Gap present      If the patient is female:    4. For patients aged 41-77: Has the patient had a mammogram within the past 2 years? Yes - Care Gap present. OVERDUE!      5. For patients aged 21-65: Has the patient had a pap smear?  NA - based on age or sex

## 2022-02-03 RX ORDER — ALBUTEROL SULFATE 90 UG/1
1 AEROSOL, METERED RESPIRATORY (INHALATION)
Qty: 18 G | Refills: 3 | Status: SHIPPED | OUTPATIENT
Start: 2022-02-03 | End: 2022-08-01 | Stop reason: SDUPTHER

## 2022-03-01 ENCOUNTER — PATIENT MESSAGE (OUTPATIENT)
Dept: FAMILY MEDICINE CLINIC | Age: 67
End: 2022-03-01

## 2022-03-18 PROBLEM — R00.1 BRADYCARDIA: Status: ACTIVE | Noted: 2021-01-18

## 2022-03-18 PROBLEM — J45.20 MILD INTERMITTENT ASTHMA WITHOUT COMPLICATION: Status: ACTIVE | Noted: 2021-01-18

## 2022-03-19 PROBLEM — D23.30 SQUAMOUS ACANTHOMA OF FACE: Status: ACTIVE | Noted: 2021-01-18

## 2022-03-20 PROBLEM — Z86.69 HISTORY OF MIGRAINE HEADACHES: Status: ACTIVE | Noted: 2018-12-05

## 2022-05-20 ENCOUNTER — TELEPHONE (OUTPATIENT)
Dept: INTERNAL MEDICINE CLINIC | Age: 67
End: 2022-05-20

## 2022-05-20 DIAGNOSIS — U07.1 COVID-19 VIRUS INFECTION: Primary | ICD-10-CM

## 2022-05-20 NOTE — TELEPHONE ENCOUNTER
Pt says she thought she had flu. She tested for covid and is positive. Cough, sneezing feels like someone is sitting on her chest.    Symptoms started Monday. She wants to know what she should do?

## 2022-05-20 NOTE — TELEPHONE ENCOUNTER
Patient reached and made aware of Paxlovid sent to pharmacy and message per Dr Antwan Israel. Patient verbalized understanding.

## 2022-05-20 NOTE — TELEPHONE ENCOUNTER
Patient calling stating she tested positive today for covid. States she has been experiencing a productive cough (unsure of what color the phlegm is), chest tightness (pt's albuterol inhaler has not been working properly), and running a low grade fever all week. Pt inquiring if there is something you can call in for her. Please advise.

## 2022-05-20 NOTE — TELEPHONE ENCOUNTER
I have E scribed Paxlovid to her pharmacy. This is the current treatment for those positive for COVID. She needs to hold her atorvastatin while taking this medication for 5 days. She needs to be aware this medication could decrease the effectiveness of her Plavix but I believe she would benefit from this therapy.   Thank you

## 2022-08-01 ENCOUNTER — OFFICE VISIT (OUTPATIENT)
Dept: INTERNAL MEDICINE CLINIC | Age: 67
End: 2022-08-01
Payer: MEDICARE

## 2022-08-01 VITALS
RESPIRATION RATE: 16 BRPM | WEIGHT: 101 LBS | TEMPERATURE: 97.3 F | DIASTOLIC BLOOD PRESSURE: 59 MMHG | BODY MASS INDEX: 19.07 KG/M2 | HEART RATE: 56 BPM | HEIGHT: 61 IN | SYSTOLIC BLOOD PRESSURE: 113 MMHG | OXYGEN SATURATION: 95 %

## 2022-08-01 DIAGNOSIS — I25.10 CORONARY ARTERY DISEASE INVOLVING NATIVE CORONARY ARTERY OF NATIVE HEART WITHOUT ANGINA PECTORIS: ICD-10-CM

## 2022-08-01 DIAGNOSIS — D23.30: ICD-10-CM

## 2022-08-01 DIAGNOSIS — E78.5 HYPERLIPIDEMIA LDL GOAL <100: ICD-10-CM

## 2022-08-01 DIAGNOSIS — Z71.89 ADVANCED DIRECTIVES, COUNSELING/DISCUSSION: ICD-10-CM

## 2022-08-01 DIAGNOSIS — Z00.00 MEDICARE ANNUAL WELLNESS VISIT, SUBSEQUENT: Primary | ICD-10-CM

## 2022-08-01 DIAGNOSIS — J45.20 MILD INTERMITTENT ASTHMA WITHOUT COMPLICATION: ICD-10-CM

## 2022-08-01 DIAGNOSIS — M62.838 NECK MUSCLE SPASM: ICD-10-CM

## 2022-08-01 DIAGNOSIS — R00.1 BRADYCARDIA: ICD-10-CM

## 2022-08-01 DIAGNOSIS — G43.909 MIGRAINE SYNDROME: ICD-10-CM

## 2022-08-01 DIAGNOSIS — Z78.0 POSTMENOPAUSAL STATE: ICD-10-CM

## 2022-08-01 PROCEDURE — G8432 DEP SCR NOT DOC, RNG: HCPCS | Performed by: NURSE PRACTITIONER

## 2022-08-01 PROCEDURE — G8536 NO DOC ELDER MAL SCRN: HCPCS | Performed by: NURSE PRACTITIONER

## 2022-08-01 PROCEDURE — 1090F PRES/ABSN URINE INCON ASSESS: CPT | Performed by: NURSE PRACTITIONER

## 2022-08-01 PROCEDURE — G8399 PT W/DXA RESULTS DOCUMENT: HCPCS | Performed by: NURSE PRACTITIONER

## 2022-08-01 PROCEDURE — 1101F PT FALLS ASSESS-DOCD LE1/YR: CPT | Performed by: NURSE PRACTITIONER

## 2022-08-01 PROCEDURE — 3017F COLORECTAL CA SCREEN DOC REV: CPT | Performed by: NURSE PRACTITIONER

## 2022-08-01 PROCEDURE — 1123F ACP DISCUSS/DSCN MKR DOCD: CPT | Performed by: NURSE PRACTITIONER

## 2022-08-01 PROCEDURE — 99497 ADVNCD CARE PLAN 30 MIN: CPT | Performed by: NURSE PRACTITIONER

## 2022-08-01 PROCEDURE — 99213 OFFICE O/P EST LOW 20 MIN: CPT | Performed by: NURSE PRACTITIONER

## 2022-08-01 PROCEDURE — G0463 HOSPITAL OUTPT CLINIC VISIT: HCPCS | Performed by: NURSE PRACTITIONER

## 2022-08-01 PROCEDURE — G8420 CALC BMI NORM PARAMETERS: HCPCS | Performed by: NURSE PRACTITIONER

## 2022-08-01 PROCEDURE — G8427 DOCREV CUR MEDS BY ELIG CLIN: HCPCS | Performed by: NURSE PRACTITIONER

## 2022-08-01 PROCEDURE — G0439 PPPS, SUBSEQ VISIT: HCPCS | Performed by: NURSE PRACTITIONER

## 2022-08-01 RX ORDER — ALBUTEROL SULFATE 90 UG/1
1 AEROSOL, METERED RESPIRATORY (INHALATION)
Qty: 18 G | Refills: 3 | Status: SHIPPED | OUTPATIENT
Start: 2022-08-01

## 2022-08-01 RX ORDER — CYCLOBENZAPRINE HCL 10 MG
5 TABLET ORAL
Qty: 30 TABLET | Refills: 0 | Status: SHIPPED | OUTPATIENT
Start: 2022-08-01

## 2022-08-01 NOTE — PROGRESS NOTES
This is the Subsequent Medicare Annual Wellness Exam, performed 12 months or more after the Initial AWV or the last Subsequent AWV    I have reviewed the patient's medical history in detail and updated the computerized patient record. Assessment/Plan   Education and counseling provided:  Are appropriate based on today's review and evaluation  Influenza Vaccine  Screening Mammography  Screening Pap and pelvic (covered once every 2 years)  Colorectal cancer screening tests  Cardiovascular screening blood test  Bone mass measurement (DEXA)  Screening for glaucoma  Overall patient is doing well    1. Medicare annual wellness visit, subsequent  2. Advanced directives, counseling/discussion  -     ADVANCE CARE PLANNING FIRST 30 MINS  -     DO NOT RESUSCITATE  -     REFERRAL TO ACP CLINICAL SPECIALIST  3. Postmenopausal state  -     DEXA BONE DENSITY STUDY AXIAL; Future  4. Mild intermittent asthma without complication  -     albuterol (PROVENTIL HFA, VENTOLIN HFA, PROAIR HFA) 90 mcg/actuation inhaler; Take 1 Puff by inhalation every six (6) hours as needed for Wheezing. 1-2 puffs every 6 hours as needed  Indications: asthma attack, Normal, Disp-18 g, R-3Note to pharmacy, ventolin not covered. 5. Neck muscle spasm  -     cyclobenzaprine (FLEXERIL) 10 mg tablet; Take 0.5 Tablets by mouth three (3) times daily as needed for Muscle Spasm(s). , Normal, Disp-30 Tablet, R-0  6. Coronary artery disease involving native coronary artery of native heart without angina pectoris  7. Bradycardia  8. Hyperlipidemia LDL goal <100  9. Migraine syndrome  10.  Squamous acanthoma of face     Depression Risk Factor Screening     3 most recent PHQ Screens 7/29/2022   Little interest or pleasure in doing things Not at all   Feeling down, depressed, irritable, or hopeless Not at all   Total Score PHQ 2 0   Trouble falling or staying asleep, or sleeping too much -   Feeling tired or having little energy -   Poor appetite, weight loss, or overeating -   Feeling bad about yourself - or that you are a failure or have let yourself or your family down -   Trouble concentrating on things such as school, work, reading, or watching TV -   Moving or speaking so slowly that other people could have noticed; or the opposite being so fidgety that others notice -   Thoughts of being better off dead, or hurting yourself in some way -   PHQ 9 Score -   How difficult have these problems made it for you to do your work, take care of your home and get along with others -       Alcohol & Drug Abuse Risk Screen   Do you average more than 1 drink per night or more than 7 drinks a week?: (P) No  On any one occasion in the past three months have you had more than 3 drinks containing alcohol?: (P) No          Functional Ability and Level of Safety   Hearing:  Hearing: (P) additional comments below  Hearing comments: (P) I lost the hearing in my right ear around 2012 due to viral labyrinthitis. Activities of Daily Living: The home contains: (P) no safety equipment  Functional ADLs: (P) Patient does total self care   Ambulation:  Patient ambulates: (P) with no difficulty   Fall Risk:  Fall Risk Assessment, last 12 mths 8/1/2022   Able to walk? Yes   Fall in past 12 months? 0   Do you feel unsteady? 0   Are you worried about falling 0     Abuse Screen:  Do you ever feel afraid of your partner?: No  Are you in a relationship with someone who physically or mentally threatens you?: No  Is it safe for you to go home?: Yes      Cognitive Screening   Has your family/caregiver stated any concerns about your memory?: (P) No   Cognitive Screening: Normal - Clock Drawing Test    Health Maintenance Due   There are no preventive care reminders to display for this patient.       Patient Care Team   Patient Care Team:  Ragini Bahena DNP as PCP - General (Nurse Practitioner)  Ragini Bahena DNP as PCP - [facility] Empaneled Provider  Farooq Mallory NP (Nurse Practitioner)  Jeffry Quarles MD (Cardiovascular Disease Physician)  Maksim Lucas MD (Dermatology Physician)  Lissett Lozano as Consulting Provider (Optometry)  Tyler Maria MD as Consulting Provider (Obstetrics & Gynecology)    History     Patient Active Problem List   Diagnosis Code    Allergic rhinitis 477    Hearing loss in right ear H91.91    Hyperlipidemia LDL goal <100 E78.5    History of coronary artery stent placement Z95.5    Coronary artery disease involving native coronary artery of native heart without angina pectoris I25.10    History of migraine headaches Z86.69    Bradycardia R00.1    Mild intermittent asthma without complication R63.24    Squamous acanthoma of face D23.30     Past Medical History:   Diagnosis Date    Acute coronary syndrome ([facility] [address] 2016    stent, right coronary artery, 99% occlusion.  Dr Mary Carmen Navarro    Allergic rhinitis     Asthma     Bradycardia 2021    CAD (coronary artery disease)     Cancer ([facility] [address]     cervical    Carpal tunnel syndrome     Dyslipidemia     mild    Headache Migraines    Hearing loss in right ear     sudden right hearing loss associated with vertigo    History of coronary artery stent placement 2016    History of migraine headaches 2018    Hyperlipidemia LDL goal <100 2016    Impingement syndrome of shoulder, bursitis, left shoulder 2010    Mild intermittent asthma without complication     Squamous acanthoma of face 2021      Past Surgical History:   Procedure Laterality Date    ENDOSCOPY, COLON, DIAGNOSTIC      HX  SECTION      X2    HX COLONOSCOPY  ,     HX CORONARY STENT PLACEMENT  16    3.0 x 18 mm Xience stent and 3.0 x 8 mm Xience stent    HX HEART CATHETERIZATION  16    99% RCA    HX HYSTERECTOMY      for cervical cancer; pelvic exams Marjan Barraza    HX ORTHOPAEDIC      left knee surgery arthroscopy    HX ORTHOPAEDIC      carpal tunnel left     HX PELVIC LAPAROSCOPY      X2    HX TONSILLECTOMY       Current Outpatient Medications   Medication Sig Dispense Refill    albuterol (PROVENTIL HFA, VENTOLIN HFA, PROAIR HFA) 90 mcg/actuation inhaler Take 1 Puff by inhalation every six (6) hours as needed for Wheezing. 1-2 puffs every 6 hours as needed  Indications: asthma attack 18 g 3    cyclobenzaprine (FLEXERIL) 10 mg tablet Take 0.5 Tablets by mouth three (3) times daily as needed for Muscle Spasm(s). 30 Tablet 0    SUMAtriptan (IMITREX) 100 mg tablet Take 1 Tablet by mouth daily as needed for Migraine. 9 Tablet 5    atorvastatin (LIPITOR) 40 mg tablet Take 1 Tablet by mouth nightly. 90 Tablet 3    clopidogreL (PLAVIX) 75 mg tab take 1 tablet once daily 90 Tablet 3    metoprolol tartrate (LOPRESSOR) 25 mg tablet take 1/2 tablet by mouth every 12 hours 90 Tablet 3    nitroglycerin (NITROSTAT) 0.4 mg SL tablet 1 Tablet by SubLINGual route as needed for Chest Pain. 1 Bottle 3    aspirin 81 mg chewable tablet Take 1 Tab by mouth daily. 30 Tab 1    multivitamin (ONE A DAY) tablet Take 1 Tab by mouth daily. CALCIUM CARBONATE (CALTRATE 600 PO) Take 1 Tab by mouth two (2) times a day.        Allergies   Allergen Reactions    Hay Fever And Allergy Relief Cough, Drowsiness, Runny Nose, Shortness of Breath and Sneezing       Family History   Problem Relation Age of Onset    Cancer Mother     Heart Disease Father     Cancer Father         lung ca    Migraines Father     Cancer Other     Diabetes Maternal Aunt     Heart Disease Paternal Uncle     Heart Disease Paternal Uncle     Heart Disease Maternal Grandfather     Heart Disease Paternal Grandfather     Hypertension Maternal Grandmother      Social History     Tobacco Use    Smoking status: Never    Smokeless tobacco: Never   Substance Use Topics    Alcohol use: Yes     Comment: an ocassional glass of wine; Arabella Díaz (maybe 5-6 a year)         Tanika Robledo, [address]

## 2022-08-01 NOTE — ACP (ADVANCE CARE PLANNING)
Advance Care Planning     Advance Care Planning (ACP) Physician/NP/PA Conversation      Date of Conversation: 8/1/2022  Conducted with: Patient with Decision Making Capacity    Healthcare Decision Maker:     Primary Decision Maker: Eddie Patten - [phone]  Click here to complete [address] including selection of the Healthcare Decision Maker Relationship (ie \"Primary\")      Today we documented Decision Maker(s). The patient will provide ACP documents. Care Preferences:    Hospitalization: \"If your health worsens and it becomes clear that your chance of recovery is unlikely, what would be your preference regarding hospitalization? \"  The patient would prefer comfort-focused treatment without hospitalization. Ventilation: \"If you were unable to breathe on your own and your chance of recovery was unlikely, what would be your preference about the use of a ventilator (breathing machine) if it was available to you? \"   The patient would NOT desire the use of a ventilator. Resuscitation: \"In the event your heart stopped as a result of an underlying serious health condition, would you want attempts to be made to restart your heart, or would you prefer a natural death? \"   No, do NOT attempt to resuscitate.     Additional topics discussed: ventilation preferences, hospitalization preferences, and resuscitation preferences    Conversation Outcomes / Follow-Up Plan:   ACP incomplete - refer to ACP Clinical Specialist  Reviewed DNR/DNI and patient elects DNR order - referred to ACP Clinical Specialist & placed order     Length of Voluntary ACP Conversation in minutes:  16 minutes    Nick Israel DNP

## 2022-08-01 NOTE — PATIENT INSTRUCTIONS

## 2022-08-01 NOTE — PROGRESS NOTES
[facility] [id number]   [place], [address]  [phone] [facility]/[phone] fax    2022    Sammi Resides 1955 is a pleasant [address] female. Patient is  and lives alone. 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  position with [employer]. Past Medical History:   Diagnosis Date    Acute coronary syndrome ([facility] [address] 2016    stent, right coronary artery, 99% occlusion. Dr Adilson Richard    Allergic rhinitis     Asthma     Bradycardia 2021    CAD (coronary artery disease)     Cancer ([facility] [address]     cervical    Carpal tunnel syndrome     Dyslipidemia     mild    Headache Migraines    Hearing loss in right ear     sudden right hearing loss associated with vertigo    History of coronary artery stent placement 2016    History of migraine headaches 2018    Hyperlipidemia LDL goal <100 2016    Impingement syndrome of shoulder, bursitis, left shoulder 2010    Mild intermittent asthma without complication     Squamous acanthoma of face 2021     Past Surgical History:   Procedure Laterality Date    ENDOSCOPY, COLON, DIAGNOSTIC      HX  SECTION      X2    HX COLONOSCOPY  ,     HX CORONARY STENT PLACEMENT  16    3.0 x 18 mm Xience stent and 3.0 x 8 mm Xience stent    HX HEART CATHETERIZATION  16    99% RCA    HX HYSTERECTOMY      for cervical cancer; pelvic exams Marjan Barraza    HX ORTHOPAEDIC      left knee surgery arthroscopy    HX ORTHOPAEDIC      carpal tunnel left     HX PELVIC LAPAROSCOPY      X2    HX TONSILLECTOMY       Current Outpatient Medications   Medication Sig    albuterol (PROVENTIL HFA, VENTOLIN HFA, PROAIR HFA) 90 mcg/actuation inhaler Take 1 Puff by inhalation every six (6) hours as needed for Wheezing.  1-2 puffs every 6 hours as needed  Indications: asthma attack    cyclobenzaprine (FLEXERIL) 10 mg tablet Take 0.5 Tablets by mouth three (3) times daily as needed for Muscle Spasm(s). SUMAtriptan (IMITREX) 100 mg tablet Take 1 Tablet by mouth daily as needed for Migraine. atorvastatin (LIPITOR) 40 mg tablet Take 1 Tablet by mouth nightly. clopidogreL (PLAVIX) 75 mg tab take 1 tablet once daily    metoprolol tartrate (LOPRESSOR) 25 mg tablet take 1/2 tablet by mouth every 12 hours    nitroglycerin (NITROSTAT) 0.4 mg SL tablet 1 Tablet by SubLINGual route as needed for Chest Pain. aspirin 81 mg chewable tablet Take 1 Tab by mouth daily. multivitamin (ONE A DAY) tablet Take 1 Tab by mouth daily. CALCIUM CARBONATE (CALTRATE 600 PO) Take 1 Tab by mouth two (2) times a day. No current facility-administered medications for this visit. Allergies and Intolerances: Allergies   Allergen Reactions    Hay Fever And Allergy Relief Cough, Drowsiness, Runny Nose, Shortness of Breath and Sneezing     Family History:   Family History   Problem Relation Age of Onset    Cancer Mother     Heart Disease Father     Cancer Father         lung ca    Migraines Father     Cancer Other     Diabetes Maternal Aunt     Heart Disease Paternal Uncle     Heart Disease Paternal Uncle     Heart Disease Maternal Grandfather     Heart Disease Paternal Grandfather     Hypertension Maternal Grandmother      Social History:   She  reports that she has never smoked.  She has never used smokeless tobacco.   Social History     Substance and Sexual Activity   Alcohol Use Yes    Comment: an ocassional glass of wine; Rosalea Ayde (maybe 5-6 a year)     Immunization History:  Immunization History   Administered Date(s) Administered    COVID-19, MODERNA BLUE border, Primary or Immunocompromised, (age 18y+), IM, 100 mcg/0.5mL 03/04/2021, 04/01/2021    COVID-19, MODERNA Booster BLUE border, (age 18y+), IM, 50mcg/0.25mL 10/29/2021    Influenza Vaccine (Tri) Adjuvanted (>65 Yrs FLUAD TRI [id number]) 12/05/2018, 12/11/2019    Influenza Vaccine Whole 12/12/2003 Influenza, Quadrivalent, Adjuvanted (>65 Yrs FLUAD QUAD [id number]) 09/22/2020    Pneumococcal Conjugate (PCV-13) 12/11/2019, 09/30/2020    Pneumococcal Polysaccharide (PPSV-23) 07/16/2021    TB Skin Test (PPD) Intradermal 08/27/2019    TD Vaccine 05/05/2006    Zoster Vaccine, Live 10/16/2015       Todays concerns/HPI:  Covid infection. Recuperated. No residual.  Muscle spasm neck. Takes flexeril as needed. Not very often. Cardio: In 2016, 2 stents placed by Dr. Bonnie Duong after experiencing chest pain with exertion and at rest.     Skin cancer: Was diagnosed 2019. She follows up yearly with Dr Abran Martinez, derm. HM: GYN ([address]) orders mammo. Due 9/2022. Dr Grace Valladares eye exam due 9/2022  Will discuss with her pharmacist: Shingles and TDap      Review of Systems:  Pertinent items are noted in HPI. Review of Data:  N/a    Physical:   Visit Vitals  BP (!) 113/59   Pulse (!) 56   Temp 97.3 °F (36.3 °C) (Temporal)   Resp 16   Ht 5' 1\" (1.549 m)   Wt 101 lb (45.8 kg)   SpO2 95%   BMI 19.08 kg/m²      Wt Readings from Last 3 Encounters:   08/01/22 101 lb (45.8 kg)   02/02/22 96 lb 3.2 oz (43.6 kg)   11/09/21 103 lb (46.7 kg)       Exam:   Physical Exam  Constitutional:       Appearance: Normal appearance. HENT:      Head: Normocephalic and atraumatic. Right Ear: Tympanic membrane, ear canal and external ear normal.      Left Ear: Tympanic membrane, ear canal and external ear normal.      Mouth/Throat:      Mouth: Mucous membranes are moist.   Eyes:      Extraocular Movements: Extraocular movements intact. Cardiovascular:      Rate and Rhythm: Normal rate and regular rhythm. Pulses: Normal pulses. Heart sounds: Normal heart sounds. Comments: No edema noted to aly LEs  Pulmonary:      Effort: Pulmonary effort is normal. No respiratory distress. Breath sounds: Normal breath sounds. Abdominal:      General: Abdomen is flat. Bowel sounds are normal.      Palpations: Abdomen is soft. Musculoskeletal:         General: Normal range of motion. Cervical back: Normal range of motion. Skin:     General: Skin is warm and dry. Neurological:      Mental Status: She is alert and oriented to person, place, and time. Psychiatric:         Mood and Affect: Mood normal.         Behavior: Behavior normal.      Body mass index is 19.08 kg/m². Plan:    1. Medicare annual wellness visit, subsequent  [facility] Wellness: Reviewed all HM and ordered tests/imaging appropriately. Reviewed care teams and medications with updates. Advanced Care Planning: Patient educated on the importance of an advanced care plan and paperwork was given to the patient today. Asked patient to read over the information and bring back at next appointment. 2. Advanced directives, counseling/discussion  Pt elects DNR    - ADVANCE CARE PLANNING FIRST 30 MINS  - DO NOT RESUSCITATE  - REFERRAL TO ACP CLINICAL SPECIALIST    3. Postmenopausal state  2018 DEXA revealed osteopenia  Continue Calcium supplements. - DEXA BONE DENSITY STUDY AXIAL; Future    4. Mild intermittent asthma without complication    - albuterol (PROVENTIL HFA, VENTOLIN HFA, PROAIR HFA) 90 mcg/actuation inhaler; Take 1 Puff by inhalation every six (6) hours as needed for Wheezing. 1-2 puffs every 6 hours as needed  Indications: asthma attack  Dispense: 18 g; Refill: 3    5. Neck muscle spasm    - cyclobenzaprine (FLEXERIL) 10 mg tablet; Take 0.5 Tablets by mouth three (3) times daily as needed for Muscle Spasm(s). Dispense: 30 Tablet; Refill: 0    6. Coronary artery disease involving native coronary artery of native heart without angina pectoris  Specialist managed condition is being evaluated/managed by Dr. Mariela Moran. No acute findings today meriting change in management plan. 7. Bradycardia  Asymptomatic    8. Hyperlipidemia LDL goal <100  12/2021 TG 68; LDL 64; HDL 81  Continue Atorvastatin 40mg every day    9.  Migraine syndrome  Continue Imitrex as needed    10. Squamous acanthoma of face  Specialist managed condition is being evaluated/managed by Dr. Rene Bhatti. No acute findings today meriting change in management plan. Reviewed medication and completed medication reconciliation with the patient. Reviewed side effects of medications with the patient. Questions were answered and patient verb understanding.       Follow up 12 months ACP labs (Lipid, CMP, CBC)      Dr. Amos Salmeron, AGNP-C, DNP  [facility] [address] [place] [address]

## 2022-08-01 NOTE — PROGRESS NOTES
Gala Osman presents today for   Chief Complaint   Patient presents with    Annual Wellness Visit       1. \"Have you been to the ER, urgent care clinic since your last visit? Hospitalized since your last visit? \" no    2. \"Have you seen or consulted any other health care providers outside of the [address] [place] [address] since your last visit? \" no     3. For patients aged 39-70: Has the patient had a colonoscopy / FIT/ Cologuard? Yes - no Care Gap present      If the patient is female:    4. For patients aged 41-77: Has the patient had a mammogram within the past 2 years? Yes - no Care Gap present  See top three    5. For patients aged 21-65: Has the patient had a pap smear?  Yes - no Care Gap present

## 2022-08-02 ENCOUNTER — PATIENT OUTREACH (OUTPATIENT)
Dept: CASE MANAGEMENT | Age: 67
End: 2022-08-02

## 2022-08-02 NOTE — ACP (ADVANCE CARE PLANNING)
Advance Care Planning   Ambulatory ACP Specialist Patient Outreach    Date:  8/2/2022    ACP Specialist:  Sophie Alcaraz LPN    Outreach call to patient in follow-up to ACP Specialist referral from:    [x] PCP  [] Provider   [] Ambulatory Care Management [] Other     For:                  [x] Advance Directive Assistance              [x] Complete Portable DNR order              [] Complete POST/MOST              [] Code Status Discussion             [] Discuss Goals of Care             [] Early ACP Decision-Making              [] Other (Specify)    Date Referral Received: 8/2/22    Today's Outreach:  [x] First   [] Second  [] Third       Third outreach made by: [] Phone  [] Email / mail    [] [facility]     Intervention:  [x] Spoke with Patient   [] Left VM requesting return call      Outcome:  LPN spoke with the pt who wishes to move forward in having an ACP conversation with an ACP specialist. Pt is alert and oriented x4. Appointment scheduled for 8/10/22 at 1 pm. ACP information has been e-mailed to the pt for review prior to the appointment. Next Step:   [x] ACP scheduled conversation  [] Outreach again in one week               [x] Email / Mail ACP Info Sheets  [] Email / Mail Advance Directive   [] Closing referral.  Routing closure to referring provider/staff and to ACP Specialist . [] Closure letter mailed to patient with invitation to contact ACP Specialist if / when ready.   Thank you for this referral.

## 2022-08-04 DIAGNOSIS — Z78.0 POSTMENOPAUSAL STATE: ICD-10-CM

## 2022-08-05 NOTE — TELEPHONE ENCOUNTER
Requested Prescriptions     Pending Prescriptions Disp Refills    clopidogreL (PLAVIX) 75 mg tab 90 Tablet 4     Sig: take 1 tablet once daily    metoprolol tartrate (LOPRESSOR) 25 mg tablet 90 Tablet 4 Yes

## 2022-08-09 ENCOUNTER — DOCUMENTATION ONLY (OUTPATIENT)
Dept: CASE MANAGEMENT | Age: 67
End: 2022-08-09

## 2022-08-09 NOTE — ACP (ADVANCE CARE PLANNING)
Advance Care Planning   Ambulatory ACP Specialist Patient Outreach    Date:  8/9/2022    ACP Specialist:  Dariusz Love LCSW    Outreach call to patient in follow-up to ACP Specialist referral from:    [x] PCP  [] Provider   [] Ambulatory Care Management [] Other     For:                  [x] Advance Directive Assistance              [x] Complete Portable DNR order              [] Complete POST/MOST              [] Code Status Discussion             [] Discuss Goals of Care             [] Early ACP Decision-Making              [] Other (Specify)    Date Referral Received:8/9/22    Today's Outreach:  [x] First   [] Second  [] Third       Third outreach made by: [] Phone  [] Email / mail    [] MyChart     Intervention:  [] Spoke with Patient   [x] Left VM requesting return call      Outcome:A reminder call was provided regarding Pt's upcoming ACP appointment. Unfortunately, there was no answer at the time of the call. A VM was left with the date/time of appt. Next Step:   [x] ACP scheduled conversation  [] Outreach again in one week               [] Email / Mail ACP Info Sheets  [] Email / Mail Advance Directive   [] Closing referral.  Routing closure to referring provider/staff and to ACP Specialist . [] Closure letter mailed to patient with invitation to contact ACP Specialist if / when ready.   Thank you for this referral.         Dariusz Love LCSW, [address]

## 2022-08-10 ENCOUNTER — DOCUMENTATION ONLY (OUTPATIENT)
Dept: CASE MANAGEMENT | Age: 67
End: 2022-08-10

## 2022-08-10 NOTE — ACP (ADVANCE CARE PLANNING)
Advance Care Planning     Advance Care Planning Clinical Specialist  Conversation Note      Date of ACP Conversation: 08/10/22    Conversation Conducted with:  Patient with Decision Making Capacity    ACP Clinical Specialist: July Conway, [address] Decision Maker:    Current Designated Health Care Decision Maker:     Primary Decision Maker: Shanda Hutchinson Child - [phone]    Secondary Decision Maker: Gilberto Polo - [phone]    Today we identified Pt's HCDMs, discussed her wishes in various medical scenarios and completed two new ACP documents. Care Preferences    Hospitalization: \"If your health worsens and it becomes clear that your chance of recovery is unlikely, what would your preference be regarding hospitalization? \"    Choice:  []  The patient wants hospitalization  [x]  The patient prefers comfort-focused treatment without hospitalization. Ventilation: \"If you were in your present state of health and suddenly became very ill and were unable to breathe on your own, what would your preference be about the use of a ventilator (breathing machine) if it were available to you? \"      If patient would desire the use of a ventilator (breathing machine), answer \"yes\", if not \"no\":yes, \"If it is something I'm going to get better from. \"    \"If your health worsens and it becomes clear that your chance of recovery is unlikely, what would your preference be about the use of a ventilator (breathing machine) if it were available to you? \"     Would the patient desire the use of a ventilator (breathing machine)? NO      Resuscitation  \"CPR works best to restart the heart when there is a sudden event, like a heart attack, in someone who is otherwise healthy. Unfortunately, CPR does not typically restart the heart for people who have serious health conditions or who are very sick. \"    \"In the event your heart stopped as a result of an underlying serious health condition, would you want attempts to be made to restart your heart (answer \"yes\" for attempt to resuscitate) or would you prefer a natural death (answer \"no\" for do not attempt to resuscitate)? \" no, After careful consideration, Pt has decided she would NOT want resuscitative efforts taken, regardless of the circumstances surrounding a cardiac arrest. Education was provided regarding a DDNR. Pt is agreeable to complete the DDNR today. [x] Yes  [] No   Educated Patient / Geri Vegas regarding differences between Advance Directives and portable DNR orders. Length of ACP Conversation in minutes:  60m    Conversation Outcomes:  [x] ACP discussion completed  [] Existing advance directive reviewed with patient; no changes to patient's previously recorded wishes   [x] New Advance Directive completed   [x] Portable Do Not Resuscitate prepared for Provider review and signature  [] POLST/POST/MOLST/MOST prepared for Provider review and signature      Follow-up plan:    [] Schedule follow-up conversation to continue planning  [] Referred individual to Provider for additional questions/concerns   [x] Advised patient/agent/surrogate to review completed ACP document and update if needed with changes in condition, patient preferences or care setting     [x] This note routed to one or more involved healthcare providers    8/10- Pt was well engaged throughout her call. She openly discussed her experiences watching her mother's struggles at end of life; which have greatly impacted her own medical wishes. She successfully identified her HCDMs and voiced her wishes in several medical scenarios. She is choosing to complete both the [facility] AMD and DDNR via DocuSign. Once finalized, they will be uploaded into the EMR. At that time, this referral will be recommended for closure.           Tc Daly, RAMESH, [facility]-  Advance Care   Population Health

## 2022-08-15 ENCOUNTER — HOSPITAL ENCOUNTER (OUTPATIENT)
Dept: BONE DENSITY | Age: 67
Discharge: HOME | End: 2022-08-15
Attending: NURSE PRACTITIONER
Payer: MEDICARE

## 2022-08-15 PROCEDURE — 77080 DXA BONE DENSITY AXIAL: CPT

## 2022-09-27 ENCOUNTER — TRANSCRIBE ORDER (OUTPATIENT)
Dept: SCHEDULING | Age: 67
End: 2022-09-27

## 2022-09-27 DIAGNOSIS — Z12.31 ENCOUNTER FOR SCREENING MAMMOGRAM FOR MALIGNANT NEOPLASM OF BREAST: Primary | ICD-10-CM

## 2022-10-04 RX ORDER — CLOPIDOGREL BISULFATE 75 MG/1
TABLET ORAL
Qty: 90 TABLET | Refills: 3 | Status: SHIPPED | OUTPATIENT
Start: 2022-10-04

## 2022-10-04 RX ORDER — METOPROLOL TARTRATE 25 MG/1
TABLET, FILM COATED ORAL
Qty: 90 TABLET | Refills: 3 | Status: SHIPPED | OUTPATIENT
Start: 2022-10-04

## 2022-10-06 ENCOUNTER — HOSPITAL ENCOUNTER (OUTPATIENT)
Dept: MAMMOGRAPHY | Age: 67
Discharge: HOME | End: 2022-10-06
Attending: NURSE PRACTITIONER
Payer: MEDICARE

## 2022-10-06 DIAGNOSIS — Z12.31 ENCOUNTER FOR SCREENING MAMMOGRAM FOR MALIGNANT NEOPLASM OF BREAST: ICD-10-CM

## 2022-10-06 PROCEDURE — 77063 BREAST TOMOSYNTHESIS BI: CPT

## 2022-11-08 ENCOUNTER — OFFICE VISIT (OUTPATIENT)
Dept: CARDIOLOGY CLINIC | Age: 67
End: 2022-11-08
Payer: MEDICARE

## 2022-11-08 VITALS
OXYGEN SATURATION: 97 % | DIASTOLIC BLOOD PRESSURE: 62 MMHG | WEIGHT: 103 LBS | HEART RATE: 57 BPM | HEIGHT: 61 IN | SYSTOLIC BLOOD PRESSURE: 112 MMHG | BODY MASS INDEX: 19.45 KG/M2

## 2022-11-08 DIAGNOSIS — E78.5 DYSLIPIDEMIA: ICD-10-CM

## 2022-11-08 DIAGNOSIS — Z95.5 HISTORY OF CORONARY ARTERY STENT PLACEMENT: ICD-10-CM

## 2022-11-08 DIAGNOSIS — I25.10 CORONARY ARTERY DISEASE INVOLVING NATIVE CORONARY ARTERY OF NATIVE HEART WITHOUT ANGINA PECTORIS: Primary | ICD-10-CM

## 2022-11-08 PROCEDURE — 99214 OFFICE O/P EST MOD 30 MIN: CPT | Performed by: INTERNAL MEDICINE

## 2022-11-08 PROCEDURE — G8420 CALC BMI NORM PARAMETERS: HCPCS | Performed by: INTERNAL MEDICINE

## 2022-11-08 PROCEDURE — G8536 NO DOC ELDER MAL SCRN: HCPCS | Performed by: INTERNAL MEDICINE

## 2022-11-08 PROCEDURE — G8432 DEP SCR NOT DOC, RNG: HCPCS | Performed by: INTERNAL MEDICINE

## 2022-11-08 PROCEDURE — G8427 DOCREV CUR MEDS BY ELIG CLIN: HCPCS | Performed by: INTERNAL MEDICINE

## 2022-11-08 PROCEDURE — 1101F PT FALLS ASSESS-DOCD LE1/YR: CPT | Performed by: INTERNAL MEDICINE

## 2022-11-08 PROCEDURE — G9899 SCRN MAM PERF RSLTS DOC: HCPCS | Performed by: INTERNAL MEDICINE

## 2022-11-08 PROCEDURE — G8399 PT W/DXA RESULTS DOCUMENT: HCPCS | Performed by: INTERNAL MEDICINE

## 2022-11-08 PROCEDURE — 1123F ACP DISCUSS/DSCN MKR DOCD: CPT | Performed by: INTERNAL MEDICINE

## 2022-11-08 PROCEDURE — 93000 ELECTROCARDIOGRAM COMPLETE: CPT | Performed by: INTERNAL MEDICINE

## 2022-11-08 PROCEDURE — 1090F PRES/ABSN URINE INCON ASSESS: CPT | Performed by: INTERNAL MEDICINE

## 2022-11-08 PROCEDURE — 3017F COLORECTAL CA SCREEN DOC REV: CPT | Performed by: INTERNAL MEDICINE

## 2022-11-08 NOTE — PROGRESS NOTES
Wyatt Santanaen presents today for   Chief Complaint   Patient presents with    Follow-up     1 year        Wyatt Posadas preferred language for health care discussion is english/other. Is someone accompanying this pt? no    Is the patient using any DME equipment during [address]? no    Depression Screening:  3 most recent PHQ Screens 7/29/2022   Little interest or pleasure in doing things Not at all   Feeling down, depressed, irritable, or hopeless Not at all   Total Score PHQ 2 0   Trouble falling or staying asleep, or sleeping too much -   Feeling tired or having little energy -   Poor appetite, weight loss, or overeating -   Feeling bad about yourself - or that you are a failure or have let yourself or your family down -   Trouble concentrating on things such as school, work, reading, or watching TV -   Moving or speaking so slowly that other people could have noticed; or the opposite being so fidgety that others notice -   Thoughts of being better off dead, or hurting yourself in some way -   PHQ 9 Score -   How difficult have these problems made it for you to do your work, take care of your home and get along with others -       Learning Assessment:  Learning Assessment 8/10/2016   PRIMARY LEARNER Patient   PRIMARY LANGUAGE ENGLISH   LEARNER PREFERENCE PRIMARY DEMONSTRATION   ANSWERED BY patient   RELATIONSHIP SELF       Abuse Screening:  Abuse Screening Questionnaire 8/1/2022   Do you ever feel afraid of your partner? N   Are you in a relationship with someone who physically or mentally threatens you? N   Is it safe for you to go home? Y       Fall Risk  Fall Risk Assessment, last 12 mths 8/1/2022   Able to walk? Yes   Fall in past 12 months? 0   Do you feel unsteady? 0   Are you worried about falling 0       Pt currently taking Anticoagulant therapy? ASA 81mg and Plavix     Coordination of Care:  1. Have you been to the ER, urgent care clinic since your last visit? Hospitalized since your last visit? no    2. Have you seen or consulted any other health care providers outside of the [address] [place] [address] since your last visit? Include any pap smears or colon screening.  No

## 2022-11-20 NOTE — PROGRESS NOTES
Conner Fletcher is in the office today for cardiovascular evaluation of her chronic coronary artery disease. She is a 49-year-old woman who experienced exertional chest discomfort in 2016 with increasing symptoms leading to a hospitalization in June of 2016. She ultimately required cardiac catheterization which I did with the findings of:      1. Patent left main trunk. 2.  30% mid LAD obstruction. 3.  Very small angiographically normal ramus intermediate range. 4.  Diagonal branches were angiographically normal.  5.  20% smooth mid circumflex obstruction with patent obtuse marginal branches. 6.  99% stenosis at the junction of the mid and distal third of the vessel of TIMI2 flow. The patient subsequently had stenting to RCA with  3.0 x 18 mm and 3.0 x 8 mm Xience stents with a favorable result. A nuclear stress test was done in June of 2020 and that was read as a low risk study with an ejection fraction greater than 80% and normal perfusion of the heart. In the office today the patient reports she is doing very well. She has had no chest pain or shortness of breath. She has been exercising on a regular basis. She reported that she had walked 11,000 steps in the day prior to the evaluation. Encounter Diagnoses   Name Primary? Coronary artery disease involving native coronary artery of native heart without angina pectoris Yes    Dyslipidemia     History of coronary artery stent placement        Plan; the patient continues to have stable symptoms at this time. She has had no recent chest pain. The most recent profile have available to me was done in December 2021. Her total cholesterol is 159 HDL was 81. LDL was 64.4 and triglycerides were 68. Plan to continue her Lipitor at 40 mg daily. Her blood pressure is well controlled in the office today. Plan to see her back in 1 year.     PCP: Danyel Tom DNP      Past Medical History:   Diagnosis Date    Acute coronary syndrome ([facility] [address] 2016    stent, right coronary artery, 99% occlusion. Dr Maisha House    Allergic rhinitis     Asthma     Bradycardia 2021    CAD (coronary artery disease)     Cancer ([facility] [address]     cervical    Carpal tunnel syndrome     Dyslipidemia     mild    Headache Migraines    Hearing loss in right ear     sudden right hearing loss associated with vertigo    History of coronary artery stent placement 2016    History of migraine headaches 2018    Hyperlipidemia LDL goal <100 2016    Impingement syndrome of shoulder, bursitis, left shoulder 2010    Mild intermittent asthma without complication 7534    Squamous acanthoma of face 2021       Past Surgical History:   Procedure Laterality Date    ENDOSCOPY, COLON, DIAGNOSTIC      HX  SECTION      X2    HX COLONOSCOPY  ,     HX CORONARY STENT PLACEMENT  16    3.0 x 18 mm Xience stent and 3.0 x 8 mm Xience stent    HX HEART CATHETERIZATION  16    99% RCA    HX HYSTERECTOMY      for cervical cancer; pelvic exams Marjan Barraza    HX ORTHOPAEDIC      left knee surgery arthroscopy    HX ORTHOPAEDIC      carpal tunnel left     HX PELVIC LAPAROSCOPY      X2    HX TONSILLECTOMY       Current Outpatient Medications   Medication Sig    metoprolol tartrate (LOPRESSOR) 25 mg tablet TAKE 1/2 TABLET BY MOUTH EVERY 12 HOURS    clopidogreL (PLAVIX) 75 mg tab TAKE ONE TABLET BY MOUTH DAILY    albuterol (PROVENTIL HFA, VENTOLIN HFA, PROAIR HFA) 90 mcg/actuation inhaler Take 1 Puff by inhalation every six (6) hours as needed for Wheezing. 1-2 puffs every 6 hours as needed  Indications: asthma attack    cyclobenzaprine (FLEXERIL) 10 mg tablet Take 0.5 Tablets by mouth three (3) times daily as needed for Muscle Spasm(s). SUMAtriptan (IMITREX) 100 mg tablet Take 1 Tablet by mouth daily as needed for Migraine. atorvastatin (LIPITOR) 40 mg tablet Take 1 Tablet by mouth nightly.     nitroglycerin (NITROSTAT) 0.4 mg SL tablet 1 Tablet by SubLINGual route as needed for Chest Pain. aspirin 81 mg chewable tablet Take 1 Tab by mouth daily. multivitamin (ONE A DAY) tablet Take 1 Tab by mouth daily. CALCIUM CARBONATE (CALTRATE 600 PO) Take 1 Tab by mouth two (2) times a day. No current facility-administered medications for this visit. Allergies   Allergen Reactions    Hay Fever And Allergy Relief Cough, Drowsiness, Runny Nose, Shortness of Breath and Sneezing       Social History:   Social History     Tobacco Use    Smoking status: Never    Smokeless tobacco: Never   Substance Use Topics    Alcohol use: Yes     Comment: an ocassional glass of wine; marimar (maybe 5-6 a year)         Family history: family history includes Cancer in her father, mother, and another family member; Diabetes in her maternal aunt; Heart Disease in her father, maternal grandfather, paternal grandfather, paternal uncle, and paternal uncle; Hypertension in her maternal grandmother; Migraines in her father. Review of Systems:    Constitutional: Negative . Respiratory: Negative. Cardiovascular: Negative  Gastrointestinal: Negative. Musculoskeletal: Negative. Neurological: Negative. Physical Exam:   The patient is an alert and oriented  Visit Vitals  /62 (BP 1 Location: Left upper arm, BP Patient Position: Sitting, BP Cuff Size: Adult)   Pulse (!) 57   Ht 5' 1\" (1.549 m)   Wt 46.7 kg (103 lb)   SpO2 97%   BMI 19.46 kg/m²      BP Readings from Last 3 Encounters:   11/08/22 112/62   08/01/22 (!) 113/59   02/02/22 (!) 97/54      Wt Readings from Last 3 Encounters:   11/08/22 46.7 kg (103 lb)   08/01/22 45.8 kg (101 lb)   02/02/22 43.6 kg (96 lb 3.2 oz)     HEENT: Conjuctiva white and mucosa moist  NECK: Supple without masses, tenderness or thyromegaly. There was no jugular venous distention. Carotids are  without bruits. CHEST: Symmetrical with good excursion. LUNGS: Clear to auscultation in all fields. HEART: Regular rate and rhythm. . There is a normal S1 and S2. No murmurs, rubs, clicks, or gallops   ABDOMEN: Soft without masses, tenderness or organomegaly. EXTREMITIES: Full peripheral pulses without peripheral edema. INTEGUMENT: Skin is warm and dry   NEUROLOGICAL: The patient was oriented x3 with motor function grossly intact. Review of Data: See PMH and Cardiology and Imaging sections for cardiac testing  Lab Results   Component Value Date/Time    Cholesterol, total 159 12/28/2021 09:08 AM    HDL Cholesterol 81 (H) 12/28/2021 09:08 AM    LDL, calculated 64.4 12/28/2021 09:08 AM    Triglyceride 68 12/28/2021 09:08 AM    CHOL/HDL Ratio 2.0 12/28/2021 09:08 AM       Results for orders placed or performed in visit on 11/8/22   AMB POC EKG ROUTINE W/ 12 LEADS, INTER & REP     Status: None    Narrative    Sinus bradycardia rate 57. No significant change compared to prior tracing           Jama Lr MD   [facility] [place]  [facility]  [facility] [place]  [address]    PLEASE NOTE:  This document has been produced using voice recognition software. Unrecognized errors in transcription may be present.

## 2023-06-21 RX ORDER — ATORVASTATIN CALCIUM 40 MG/1
40 TABLET, FILM COATED ORAL DAILY
Qty: 90 TABLET | Refills: 3 | Status: SHIPPED | OUTPATIENT
Start: 2023-06-21

## 2023-07-07 RX ORDER — NITROGLYCERIN 0.4 MG/1
0.4 TABLET SUBLINGUAL PRN
Qty: 25 TABLET | Refills: 3 | Status: SHIPPED | OUTPATIENT
Start: 2023-07-07

## 2023-07-27 ENCOUNTER — HOSPITAL ENCOUNTER (OUTPATIENT)
Facility: HOSPITAL | Age: 68
Setting detail: SPECIMEN
Discharge: HOME | End: 2023-07-27
Payer: MEDICARE

## 2023-07-27 ENCOUNTER — NURSE ONLY (OUTPATIENT)
Age: 68
End: 2023-07-27

## 2023-07-27 DIAGNOSIS — E78.5 HYPERLIPIDEMIA, UNSPECIFIED HYPERLIPIDEMIA TYPE: Primary | ICD-10-CM

## 2023-07-27 DIAGNOSIS — E78.5 HYPERLIPIDEMIA, UNSPECIFIED HYPERLIPIDEMIA TYPE: ICD-10-CM

## 2023-07-27 LAB
ALBUMIN SERPL-MCNC: 4 G/DL (ref 3.4–5)
ALBUMIN/GLOB SERPL: 1.5 (ref 0.8–1.7)
ALP SERPL-CCNC: 44 U/L (ref 45–117)
ALT SERPL-CCNC: 29 U/L (ref 13–56)
ANION GAP SERPL CALC-SCNC: 5 MMOL/L (ref 3–18)
AST SERPL-CCNC: 21 U/L (ref 10–38)
BASOPHILS # BLD: 0.1 K/UL (ref 0–0.1)
BASOPHILS NFR BLD: 2 % (ref 0–2)
BILIRUB SERPL-MCNC: 0.4 MG/DL (ref 0.2–1)
BUN SERPL-MCNC: 10 MG/DL (ref 7–18)
BUN/CREAT SERPL: 12 (ref 12–20)
CALCIUM SERPL-MCNC: 8.9 MG/DL (ref 8.5–10.1)
CHLORIDE SERPL-SCNC: 100 MMOL/L (ref 100–111)
CHOLEST SERPL-MCNC: 158 MG/DL
CO2 SERPL-SCNC: 28 MMOL/L (ref 21–32)
CREAT SERPL-MCNC: 0.82 MG/DL (ref 0.6–1.3)
DIFFERENTIAL METHOD BLD: ABNORMAL
EOSINOPHIL # BLD: 0.2 K/UL (ref 0–0.4)
EOSINOPHIL NFR BLD: 4 % (ref 0–5)
ERYTHROCYTE [DISTWIDTH] IN BLOOD BY AUTOMATED COUNT: 13 % (ref 11.6–14.5)
GLOBULIN SER CALC-MCNC: 2.7 G/DL (ref 2–4)
GLUCOSE SERPL-MCNC: 93 MG/DL (ref 74–99)
HCT VFR BLD AUTO: 43.4 % (ref 35–45)
HDLC SERPL-MCNC: 74 MG/DL (ref 40–60)
HDLC SERPL: 2.1 (ref 0–5)
HGB BLD-MCNC: 14.1 G/DL (ref 12–16)
IMM GRANULOCYTES # BLD AUTO: 0 K/UL (ref 0–0.04)
IMM GRANULOCYTES NFR BLD AUTO: 1 % (ref 0–0.5)
LDLC SERPL CALC-MCNC: 71.4 MG/DL (ref 0–100)
LIPID PANEL: ABNORMAL
LYMPHOCYTES # BLD: 1.7 K/UL (ref 0.9–3.6)
LYMPHOCYTES NFR BLD: 32 % (ref 21–52)
MCH RBC QN AUTO: 29.8 PG (ref 24–34)
MCHC RBC AUTO-ENTMCNC: 32.5 G/DL (ref 31–37)
MCV RBC AUTO: 91.8 FL (ref 78–100)
MONOCYTES # BLD: 0.7 K/UL (ref 0.05–1.2)
MONOCYTES NFR BLD: 13 % (ref 3–10)
NEUTS SEG # BLD: 2.6 K/UL (ref 1.8–8)
NEUTS SEG NFR BLD: 48 % (ref 40–73)
NRBC # BLD: 0 K/UL (ref 0–0.01)
NRBC BLD-RTO: 0 PER 100 WBC
PLATELET # BLD AUTO: 229 K/UL (ref 135–420)
PMV BLD AUTO: 10.2 FL (ref 9.2–11.8)
POTASSIUM SERPL-SCNC: 4.6 MMOL/L (ref 3.5–5.5)
PROT SERPL-MCNC: 6.7 G/DL (ref 6.4–8.2)
RBC # BLD AUTO: 4.73 M/UL (ref 4.2–5.3)
SODIUM SERPL-SCNC: 133 MMOL/L (ref 136–145)
TRIGL SERPL-MCNC: 63 MG/DL
VLDLC SERPL CALC-MCNC: 12.6 MG/DL
WBC # BLD AUTO: 5.3 K/UL (ref 4.6–13.2)

## 2023-07-27 PROCEDURE — 80061 LIPID PANEL: CPT

## 2023-07-27 PROCEDURE — 36415 COLL VENOUS BLD VENIPUNCTURE: CPT

## 2023-07-27 PROCEDURE — 85025 COMPLETE CBC W/AUTO DIFF WBC: CPT

## 2023-07-27 PROCEDURE — 80053 COMPREHEN METABOLIC PANEL: CPT

## 2023-08-03 SDOH — ECONOMIC STABILITY: HOUSING INSECURITY
IN THE LAST 12 MONTHS, WAS THERE A TIME WHEN YOU DID NOT HAVE A STEADY PLACE TO SLEEP OR SLEPT IN A SHELTER (INCLUDING NOW)?: NO

## 2023-08-03 SDOH — ECONOMIC STABILITY: FOOD INSECURITY: WITHIN THE PAST 12 MONTHS, THE FOOD YOU BOUGHT JUST DIDN'T LAST AND YOU DIDN'T HAVE MONEY TO GET MORE.: NEVER TRUE

## 2023-08-03 SDOH — ECONOMIC STABILITY: FOOD INSECURITY: WITHIN THE PAST 12 MONTHS, YOU WORRIED THAT YOUR FOOD WOULD RUN OUT BEFORE YOU GOT MONEY TO BUY MORE.: NEVER TRUE

## 2023-08-03 SDOH — HEALTH STABILITY: PHYSICAL HEALTH: ON AVERAGE, HOW MANY MINUTES DO YOU ENGAGE IN EXERCISE AT THIS LEVEL?: 30 MIN

## 2023-08-03 SDOH — HEALTH STABILITY: PHYSICAL HEALTH: ON AVERAGE, HOW MANY DAYS PER WEEK DO YOU ENGAGE IN MODERATE TO STRENUOUS EXERCISE (LIKE A BRISK WALK)?: 3 DAYS

## 2023-08-03 SDOH — ECONOMIC STABILITY: TRANSPORTATION INSECURITY
IN THE PAST 12 MONTHS, HAS LACK OF TRANSPORTATION KEPT YOU FROM MEETINGS, WORK, OR FROM GETTING THINGS NEEDED FOR DAILY LIVING?: NO

## 2023-08-03 SDOH — ECONOMIC STABILITY: INCOME INSECURITY: HOW HARD IS IT FOR YOU TO PAY FOR THE VERY BASICS LIKE FOOD, HOUSING, MEDICAL CARE, AND HEATING?: SOMEWHAT HARD

## 2023-08-03 ASSESSMENT — PATIENT HEALTH QUESTIONNAIRE - PHQ9
2. FEELING DOWN, DEPRESSED OR HOPELESS: 0
1. LITTLE INTEREST OR PLEASURE IN DOING THINGS: 0
SUM OF ALL RESPONSES TO PHQ QUESTIONS 1-9: 0
SUM OF ALL RESPONSES TO PHQ9 QUESTIONS 1 & 2: 0

## 2023-08-03 ASSESSMENT — LIFESTYLE VARIABLES
HOW OFTEN DO YOU HAVE A DRINK CONTAINING ALCOHOL: 2
HOW OFTEN DO YOU HAVE A DRINK CONTAINING ALCOHOL: MONTHLY OR LESS
HOW MANY STANDARD DRINKS CONTAINING ALCOHOL DO YOU HAVE ON A TYPICAL DAY: 1 OR 2
HOW OFTEN DO YOU HAVE SIX OR MORE DRINKS ON ONE OCCASION: 1
HOW MANY STANDARD DRINKS CONTAINING ALCOHOL DO YOU HAVE ON A TYPICAL DAY: 1

## 2023-08-04 ENCOUNTER — OFFICE VISIT (OUTPATIENT)
Age: 68
End: 2023-08-04

## 2023-08-04 VITALS
TEMPERATURE: 97.4 F | OXYGEN SATURATION: 96 % | WEIGHT: 103 LBS | RESPIRATION RATE: 18 BRPM | HEIGHT: 61 IN | DIASTOLIC BLOOD PRESSURE: 44 MMHG | HEART RATE: 54 BPM | SYSTOLIC BLOOD PRESSURE: 92 MMHG | BODY MASS INDEX: 19.45 KG/M2

## 2023-08-04 DIAGNOSIS — E87.1 HYPONATREMIA: ICD-10-CM

## 2023-08-04 DIAGNOSIS — E78.5 HYPERLIPIDEMIA LDL GOAL <100: ICD-10-CM

## 2023-08-04 DIAGNOSIS — Z23 NEED FOR PROPHYLACTIC VACCINATION AND INOCULATION AGAINST VARICELLA: ICD-10-CM

## 2023-08-04 DIAGNOSIS — I25.119 ATHEROSCLEROSIS OF NATIVE CORONARY ARTERY OF NATIVE HEART WITH ANGINA PECTORIS (HCC): ICD-10-CM

## 2023-08-04 DIAGNOSIS — G43.019 INTRACTABLE MIGRAINE WITHOUT AURA AND WITHOUT STATUS MIGRAINOSUS: ICD-10-CM

## 2023-08-04 DIAGNOSIS — I24.9 ACUTE CORONARY SYNDROME (HCC): ICD-10-CM

## 2023-08-04 DIAGNOSIS — R00.1 BRADYCARDIA, UNSPECIFIED: ICD-10-CM

## 2023-08-04 DIAGNOSIS — Z00.00 MEDICARE ANNUAL WELLNESS VISIT, SUBSEQUENT: Primary | ICD-10-CM

## 2023-08-04 DIAGNOSIS — M85.80 OSTEOPENIA, UNSPECIFIED LOCATION: ICD-10-CM

## 2023-08-04 DIAGNOSIS — J45.20 MILD INTERMITTENT ASTHMA WITHOUT COMPLICATION: ICD-10-CM

## 2023-08-04 DIAGNOSIS — I25.10 CORONARY ARTERY DISEASE INVOLVING NATIVE CORONARY ARTERY OF NATIVE HEART WITHOUT ANGINA PECTORIS: ICD-10-CM

## 2023-08-04 DIAGNOSIS — Z23 NEED FOR PROPHYLACTIC VACCINATION AGAINST DIPHTHERIA-TETANUS-PERTUSSIS (DTP): ICD-10-CM

## 2023-08-04 DIAGNOSIS — R00.1 BRADYCARDIA: ICD-10-CM

## 2023-08-04 DIAGNOSIS — D23.30: ICD-10-CM

## 2023-08-04 DIAGNOSIS — Z86.69 HISTORY OF MIGRAINE HEADACHES: ICD-10-CM

## 2023-08-04 DIAGNOSIS — R41.3 MEMORY CHANGES: ICD-10-CM

## 2023-08-04 NOTE — ASSESSMENT & PLAN NOTE
Monitored by specialist- no acute findings meriting change in the plan   Asymptomatic  Dr Pastor Velázquez

## 2023-08-04 NOTE — ASSESSMENT & PLAN NOTE
[facility] Wellness: Reviewed all HM and ordered tests/imaging appropriately. Reviewed care teams and medications with updates.      Review Data with Patient:  TG 63; LDL 71  Hyponatremia Mild 133 (dehydration, n/v)  CBC normal    HM:  Obtain Covid, TDap and Shingles - to obtain from pharmacy  Mammo up to date  Colonoscopy 2/2029  Eye exam 9/2023    Specialists:  Dr Héctor Mcgovern, cardio  Dr Daly Belcher

## 2023-08-04 NOTE — ASSESSMENT & PLAN NOTE
Level 133  Most likely related to dehydration.    Encouraged to stay well hydrated  Alyssa level 6w (week of 9/18/23)

## 2023-08-04 NOTE — ASSESSMENT & PLAN NOTE
DDx:  Stress  Not focused on task/conversation   Dementia/alzhiemers-less likely. No family hx. Pt does not portray this type of complication    Discussed referring to neuropsych and completing head imaging. I believe this is not appropriate at this time. Pt agrees. Instructed pt to reach out to this office if her sx worsen.

## 2023-08-04 NOTE — ASSESSMENT & PLAN NOTE
Well-controlled, continue current medications   TG 63; LDL 71  Cont statin therapy    Reduce fried fatty or oily foods in diet  Limit red meats, processed foods, and alcohol. Limit fast food  Increase physical activity to 60 minutes of moderate intensity aerobic exercise per week. Increase water intake  Reduce alcohol intake    How to raise HDL if low:  Consume oats, beans, legumes, olive oil, whole grains, nuts, seeds, fatty fish, and berries.   Lose weight/fat  Reduce alcohol consumption  Smoking cessation

## 2023-08-04 NOTE — PROGRESS NOTES
[facility] [address] [place] [address] [address]  [phone] ZQVAMA/[phone] fax    2023    Bharati Le 1955 is a pleasant White (non-) female. Here for [facility] Annual Wellness. Patient is  and lives alone. 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  position with [employer]. 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 Past Medical History:   Diagnosis Date    Acute coronary syndrome ([address]) 2016    stent, right coronary artery, 99% occlusion.  Dr Bhupinder Hunter    Allergic rhinitis     Asthma     Atherosclerotic heart disease of native coronary artery with unspecified angina pectoris 2023    Bradycardia 2021    Bradycardia, unspecified 2021    CAD (coronary artery disease)     Cancer ([address])     cervical    Carpal tunnel syndrome     Dyslipidemia     mild    Headache Migraines    Hearing loss in right ear     sudden right hearing loss associated with vertigo    History of coronary artery stent placement 2016    History of migraine headaches 2018    Hyperlipidemia LDL goal <100 2016    Hyponatremia 2023    Impingement syndrome of shoulder 2010    Mild intermittent asthma without complication     Osteopenia 2023    DEXA     Squamous acanthoma of face 2021     Past Surgical History:   Procedure Laterality Date    CARDIAC CATHETERIZATION  16    99% RCA     SECTION      X2    COLONOSCOPY      COLONOSCOPY  ,     CORONARY ANGIOPLASTY WITH STENT PLACEMENT  16    3.0 x 18 mm Xience stent and 3.0 x 8 mm Xience stent    HYSTERECTOMY (CERVIX STATUS UNKNOWN)      for cervical cancer; pelvic exams Huma Llamas    ORTHOPEDIC SURGERY      left knee surgery arthroscopy    ORTHOPEDIC SURGERY      carpal tunnel left     PELVIC LAPAROSCOPY      X2    TONSILLECTOMY       Current Outpatient Medications   Medication Sig    Tdap (ADACEL)
Tony Loulou presents today for   Chief Complaint   Patient presents with    Medicare AWV                 1. \"Have you been to the ER, urgent care clinic since your last visit? Hospitalized since your last visit? \" no    2. \"Have you seen or consulted any other health care providers outside of the [address] [place] [address] since your last visit? \" no     3. For patients aged 43-73: Has the patient had a colonoscopy / FIT/ Cologuard? NA - based on age      If the patient is female:    4. For patients aged 43-66: Has the patient had a mammogram within the past 2 years? NA - based on age or sex      11. For patients aged 21-65: Has the patient had a pap smear?  NA - based on age or sex
needed Yes Ar Automatic Reconciliation   aspirin 81 MG chewable tablet Take 1 tablet by mouth daily Yes Ar Automatic Reconciliation   clopidogrel (PLAVIX) 75 MG tablet TAKE ONE TABLET BY MOUTH DAILY Yes Ar Automatic Reconciliation   cyclobenzaprine (FLEXERIL) 10 MG tablet Take 0.5 tablets by mouth 3 times daily as needed Yes Ar Automatic Reconciliation   metoprolol tartrate (LOPRESSOR) 25 MG tablet TAKE 1/2 TABLET BY MOUTH EVERY 12 HOURS Yes Ar Automatic Reconciliation   SUMAtriptan (IMITREX) 100 MG tablet Take 1 tablet by mouth daily as needed Yes Ar Automatic Reconciliation       CareTeam (Including outside providers/suppliers regularly involved in providing care):   Patient Care Team:  Cyrus Hart DNP as PCP - [facility] MANUEL Hartman as PCP - [facility]  Alaina Martinez as Consulting Physician  Art Keith MD as Consulting Physician     Reviewed and updated this visit:  Tobacco  Allergies  Meds  Problems  Med Hx  Surg Hx  Soc Hx  Fam Hx

## 2023-08-04 NOTE — ASSESSMENT & PLAN NOTE
Monitored by specialist- no acute findings meriting change in the plan   2016, stent, right coronary artery, 99% occlusion.  Dr Stewart Failing

## 2023-08-08 ENCOUNTER — TELEPHONE (OUTPATIENT)
Age: 68
End: 2023-08-08

## 2023-08-08 DIAGNOSIS — M62.838 NECK MUSCLE SPASM: Primary | ICD-10-CM

## 2023-08-08 RX ORDER — CYCLOBENZAPRINE HCL 10 MG
5 TABLET ORAL 3 TIMES DAILY PRN
Qty: 10 TABLET | Refills: 0 | Status: SHIPPED | OUTPATIENT
Start: 2023-08-08

## 2023-08-08 NOTE — TELEPHONE ENCOUNTER
Please [facility] refill to [facility] on [phone]. Stating she really only needs like 10 tablets. She only takes these when her neck flares up and she usually cuts them in quarters. The ones she has have  so that is the only reason she is requesting this refill. cyclobenzaprine (FLEXERIL) 10 mg tablet 30 Tablet 0 2022     Sig - Route: Take 0.5 Tablets by mouth three (3) times daily as needed for Muscle Spasm(s).  - Oral

## 2023-08-18 NOTE — PROGRESS NOTES
Tariq Payne presents today to discuss concerns about her medications and memory loss. She was last seen by Dr. Nadiya Mason in November 2022. She was accompanied by her best friend who is a retired RN. She states that she has been told by family and friends that they have noticed that she has been repeating things, forgetting things, and recently could not find her way out of a neighborhood that she has been driving through for decades. She recently saw her PCP who did labs and the only abnormal was a slightly lower Na level at 133 (normal is greater than 136). Her labs will be repeated in mid Sept.  A discussion was had about a neuropsych consult and imaging but they agreed that it was not necessary at this time but will be readdressed if she feels that her symptoms are worsening. She states that she was told that it did not appear to be dementia or Alzheimer's at this time. She has no family history of Alzheimer's. She is a 77year old female with history of CAD and dyslipidemia. She underwent cardiac catheterization in June 2016 which demonstrated:   1. Patent left main trunk. 2.  30% mid LAD obstruction. 3.  Very small angiographically normal ramus intermediate range. 4.  Diagonal branches were angiographically normal.  5.  20% smooth mid circumflex obstruction with patent obtuse marginal branches. 6.  99% stenosis at the junction of the mid and distal third of the vessel of TIMI2 flow. The patient subsequently had stenting to RCA with  3.0 x 18 mm and 3.0 x 8 mm Xience stents with a favorable result. A nuclear stress test was done in June of 2020 and that was read as a low risk study with an ejection fraction greater than 80% and normal perfusion of the heart.     Tariq Payne is a 77 y.o. female presents today for :  Chief Complaint   Patient presents with    Follow-up     Concerns with long term medications and memory loss       PMH:  Past Medical History:   Diagnosis Date

## 2023-08-23 ENCOUNTER — OFFICE VISIT (OUTPATIENT)
Age: 68
End: 2023-08-23
Payer: MEDICARE

## 2023-08-23 VITALS
OXYGEN SATURATION: 98 % | SYSTOLIC BLOOD PRESSURE: 126 MMHG | DIASTOLIC BLOOD PRESSURE: 74 MMHG | WEIGHT: 107 LBS | BODY MASS INDEX: 20.2 KG/M2 | HEART RATE: 64 BPM | HEIGHT: 61 IN

## 2023-08-23 DIAGNOSIS — E78.5 HYPERLIPIDEMIA, UNSPECIFIED HYPERLIPIDEMIA TYPE: ICD-10-CM

## 2023-08-23 DIAGNOSIS — I25.10 ATHEROSCLEROSIS OF NATIVE CORONARY ARTERY OF NATIVE HEART WITHOUT ANGINA PECTORIS: Primary | ICD-10-CM

## 2023-08-23 PROBLEM — J30.9 ALLERGIC RHINITIS: Status: ACTIVE | Noted: 2023-08-23

## 2023-08-23 PROCEDURE — 1036F TOBACCO NON-USER: CPT | Performed by: NURSE PRACTITIONER

## 2023-08-23 PROCEDURE — 99214 OFFICE O/P EST MOD 30 MIN: CPT | Performed by: NURSE PRACTITIONER

## 2023-08-23 PROCEDURE — G8420 CALC BMI NORM PARAMETERS: HCPCS | Performed by: NURSE PRACTITIONER

## 2023-08-23 PROCEDURE — 1090F PRES/ABSN URINE INCON ASSESS: CPT | Performed by: NURSE PRACTITIONER

## 2023-08-23 PROCEDURE — G8427 DOCREV CUR MEDS BY ELIG CLIN: HCPCS | Performed by: NURSE PRACTITIONER

## 2023-08-23 PROCEDURE — 3017F COLORECTAL CA SCREEN DOC REV: CPT | Performed by: NURSE PRACTITIONER

## 2023-08-23 PROCEDURE — G8399 PT W/DXA RESULTS DOCUMENT: HCPCS | Performed by: NURSE PRACTITIONER

## 2023-08-23 PROCEDURE — 1123F ACP DISCUSS/DSCN MKR DOCD: CPT | Performed by: NURSE PRACTITIONER

## 2023-08-23 ASSESSMENT — ENCOUNTER SYMPTOMS
ABDOMINAL DISTENTION: 0
CONSTIPATION: 0
DIARRHEA: 0
VOMITING: 0
NAUSEA: 0
SHORTNESS OF BREATH: 0
COUGH: 0
CHEST TIGHTNESS: 0
WHEEZING: 0
BLOOD IN STOOL: 0

## 2023-08-23 ASSESSMENT — PATIENT HEALTH QUESTIONNAIRE - PHQ9
SUM OF ALL RESPONSES TO PHQ QUESTIONS 1-9: 0
2. FEELING DOWN, DEPRESSED OR HOPELESS: 0
SUM OF ALL RESPONSES TO PHQ QUESTIONS 1-9: 0
SUM OF ALL RESPONSES TO PHQ QUESTIONS 1-9: 0
SUM OF ALL RESPONSES TO PHQ9 QUESTIONS 1 & 2: 0
1. LITTLE INTEREST OR PLEASURE IN DOING THINGS: 0
SUM OF ALL RESPONSES TO PHQ QUESTIONS 1-9: 0

## 2023-08-23 NOTE — PROGRESS NOTES
Mick Lord presents today for   Chief Complaint   Patient presents with    Follow-up     Concerns with long term medications and memory loss       Mick Lord preferred language for health care discussion is english/other. Is someone accompanying this pt? yes    Is the patient using any DME equipment during OV? no    Depression Screening:  Depression: Not at risk    PHQ-2 Score: 0        Learning Assessment:  Who is the primary learner? Patient    What is the preferred language for health care of the primary learner? ENGLISH    How does the primary learner prefer to learn new concepts? DEMONSTRATION    Answered By patient    Relationship to Learner SELF           Pt currently taking Anticoagulant therapy? no    Pt currently taking Antiplatelet therapy ? Plavix 75 mg once a day and ASA 81 mg once a day      Coordination of Care:  1. Have you been to the ER, urgent care clinic since your last visit? Hospitalized since your last visit? no    2. Have you seen or consulted any other health care providers outside of the [address] [place] [address] since your last visit? Include any pap smears or colon screening.  no

## 2023-08-23 NOTE — PATIENT INSTRUCTIONS
Continue present medication regimen  Follow-up with Dr. Lashaun Chowdhury as scheduled and as needed

## 2023-09-03 PROBLEM — Z00.00 MEDICARE ANNUAL WELLNESS VISIT, SUBSEQUENT: Status: RESOLVED | Noted: 2023-08-04 | Resolved: 2023-09-03

## 2023-09-13 ENCOUNTER — TRANSCRIBE ORDERS (OUTPATIENT)
Facility: HOSPITAL | Age: 68
End: 2023-09-13

## 2023-09-13 DIAGNOSIS — Z12.31 VISIT FOR SCREENING MAMMOGRAM: Primary | ICD-10-CM

## 2023-09-15 ENCOUNTER — HOSPITAL ENCOUNTER (OUTPATIENT)
Facility: HOSPITAL | Age: 68
End: 2023-09-15
Payer: MEDICARE

## 2023-09-15 DIAGNOSIS — E87.1 HYPONATREMIA: ICD-10-CM

## 2023-09-15 LAB
ANION GAP SERPL CALC-SCNC: 6 MMOL/L (ref 3–18)
BUN SERPL-MCNC: 14 MG/DL (ref 7–18)
BUN/CREAT SERPL: 18 (ref 12–20)
CALCIUM SERPL-MCNC: 9.6 MG/DL (ref 8.5–10.1)
CHLORIDE SERPL-SCNC: 99 MMOL/L (ref 100–111)
CO2 SERPL-SCNC: 30 MMOL/L (ref 21–32)
CREAT SERPL-MCNC: 0.78 MG/DL (ref 0.6–1.3)
GLUCOSE SERPL-MCNC: 94 MG/DL (ref 74–99)
POTASSIUM SERPL-SCNC: 4.5 MMOL/L (ref 3.5–5.5)
SODIUM SERPL-SCNC: 135 MMOL/L (ref 136–145)

## 2023-09-15 PROCEDURE — 80048 BASIC METABOLIC PNL TOTAL CA: CPT

## 2023-09-15 PROCEDURE — 36415 COLL VENOUS BLD VENIPUNCTURE: CPT

## 2023-09-19 ENCOUNTER — TELEPHONE (OUTPATIENT)
Age: 68
End: 2023-09-19

## 2023-09-20 ENCOUNTER — TELEPHONE (OUTPATIENT)
Age: 68
End: 2023-09-20

## 2023-09-20 NOTE — TELEPHONE ENCOUNTER
Attempted to contact patient regarding  the following message. There was no response. Voicemail left requesting call back. \"Sodium level improving. Cont to hydrate with water.

## 2023-10-03 RX ORDER — CLOPIDOGREL BISULFATE 75 MG/1
75 TABLET ORAL DAILY
Qty: 90 TABLET | Refills: 3 | Status: SHIPPED | OUTPATIENT
Start: 2023-10-03

## 2023-10-11 ENCOUNTER — HOSPITAL ENCOUNTER (OUTPATIENT)
Facility: HOSPITAL | Age: 68
Discharge: HOME | End: 2023-10-14
Payer: MEDICARE

## 2023-10-11 VITALS — BODY MASS INDEX: 20.2 KG/M2 | HEIGHT: 61 IN | WEIGHT: 107 LBS

## 2023-10-11 DIAGNOSIS — Z12.31 VISIT FOR SCREENING MAMMOGRAM: ICD-10-CM

## 2023-10-11 PROCEDURE — 77063 BREAST TOMOSYNTHESIS BI: CPT

## 2024-01-27 DIAGNOSIS — J45.20 MILD INTERMITTENT ASTHMA WITHOUT COMPLICATION: Primary | ICD-10-CM

## 2024-01-29 NOTE — TELEPHONE ENCOUNTER
Last OV: 8/4/2023  Next OV: 8/2/2024  Last refill: 8/1/2023      Please verify if patient needs appointment with Dr. Ortiz 2/6/2024.    Patient also is following up with BLANCA Whitman DNP on 8/2/2024.

## 2024-01-31 ENCOUNTER — TELEPHONE (OUTPATIENT)
Age: 69
End: 2024-01-31

## 2024-01-31 RX ORDER — ALBUTEROL SULFATE 90 UG/1
AEROSOL, METERED RESPIRATORY (INHALATION)
Qty: 18 G | Refills: 2 | Status: SHIPPED | OUTPATIENT
Start: 2024-01-31

## 2024-06-12 RX ORDER — CLOPIDOGREL BISULFATE 75 MG/1
75 TABLET ORAL DAILY
Qty: 90 TABLET | Refills: 3 | Status: SHIPPED | OUTPATIENT
Start: 2024-06-12

## 2024-06-20 ENCOUNTER — OFFICE VISIT (OUTPATIENT)
Age: 69
End: 2024-06-20

## 2024-06-20 VITALS
SYSTOLIC BLOOD PRESSURE: 122 MMHG | BODY MASS INDEX: 20.39 KG/M2 | OXYGEN SATURATION: 97 % | HEART RATE: 54 BPM | HEIGHT: 61 IN | DIASTOLIC BLOOD PRESSURE: 70 MMHG | WEIGHT: 108 LBS

## 2024-06-20 DIAGNOSIS — E78.5 HYPERLIPIDEMIA, UNSPECIFIED HYPERLIPIDEMIA TYPE: ICD-10-CM

## 2024-06-20 DIAGNOSIS — I25.10 ATHEROSCLEROSIS OF NATIVE CORONARY ARTERY OF NATIVE HEART WITHOUT ANGINA PECTORIS: Primary | ICD-10-CM

## 2024-06-20 ASSESSMENT — PATIENT HEALTH QUESTIONNAIRE - PHQ9
1. LITTLE INTEREST OR PLEASURE IN DOING THINGS: NOT AT ALL
SUM OF ALL RESPONSES TO PHQ QUESTIONS 1-9: 0
SUM OF ALL RESPONSES TO PHQ9 QUESTIONS 1 & 2: 0
SUM OF ALL RESPONSES TO PHQ QUESTIONS 1-9: 0
2. FEELING DOWN, DEPRESSED OR HOPELESS: NOT AT ALL
SUM OF ALL RESPONSES TO PHQ QUESTIONS 1-9: 0
SUM OF ALL RESPONSES TO PHQ QUESTIONS 1-9: 0

## 2024-06-22 NOTE — PROGRESS NOTES
Lizbeth Zaragoza presents today for   Chief Complaint   Patient presents with    Follow-up     yearly       Lizbeth Zaragoza preferred language for health care discussion is english/other.    Is someone accompanying this pt? no    Is the patient using any DME equipment during OV? no    Depression Screening:  Depression: Not at risk (6/20/2024)    PHQ-2     PHQ-2 Score: 0        Learning Assessment:  Who is the primary learner? Patient    What is the preferred language for health care of the primary learner? ENGLISH    How does the primary learner prefer to learn new concepts? DEMONSTRATION    Answered By patient    Relationship to Learner SELF           Pt currently taking Anticoagulant therapy? no    Pt currently taking Antiplatelet therapy ? Aspirin  plavix      Coordination of Care:  1. Have you been to the ER, urgent care clinic since your last visit? Hospitalized since your last visit? no    2. Have you seen or consulted any other health care providers outside of the [facility] since your last visit? Include any pap smears or colon screening. no    
  NEUROLOGICAL: The patient was oriented x3 with motor function grossly intact.    Review of Data: See PMH and Cardiology and Imaging sections for cardiac testing  Lab Results   Component Value Date/Time     Results for orders placed or performed in visit on 11/8/22   AMB POC EKG ROUTINE W/ 12 LEADS, INTER & REP     Status: None    Narrative    Sinus bradycardia rate 54.  No significant change compared to prior tracing           Jarad Sethi MD   [facility]  [facility] [place]  [address]  [address]  [place] [address]    O - [phone]  B - [phone]    PLEASE NOTE:  This document has been produced using voice recognition software. Unrecognized errors in transcription may be present.

## 2024-07-04 RX ORDER — ATORVASTATIN CALCIUM 40 MG/1
40 TABLET, FILM COATED ORAL DAILY
Qty: 90 TABLET | Refills: 3 | Status: SHIPPED | OUTPATIENT
Start: 2024-07-04

## 2024-07-24 ENCOUNTER — TELEPHONE (OUTPATIENT)
Facility: CLINIC | Age: 69
End: 2024-07-24

## 2024-07-24 DIAGNOSIS — Z00.00 ENCOUNTER FOR GENERAL ADULT MEDICAL EXAMINATION WITHOUT ABNORMAL FINDINGS: ICD-10-CM

## 2024-07-24 DIAGNOSIS — E78.5 HYPERLIPIDEMIA LDL GOAL <100: Primary | ICD-10-CM

## 2024-07-26 ENCOUNTER — HOSPITAL ENCOUNTER (OUTPATIENT)
Facility: HOSPITAL | Age: 69
Setting detail: SPECIMEN
End: 2024-07-26
Payer: MEDICARE

## 2024-07-26 DIAGNOSIS — Z00.00 ENCOUNTER FOR GENERAL ADULT MEDICAL EXAMINATION WITHOUT ABNORMAL FINDINGS: ICD-10-CM

## 2024-07-26 DIAGNOSIS — E78.5 HYPERLIPIDEMIA LDL GOAL <100: ICD-10-CM

## 2024-07-26 LAB
ANION GAP SERPL CALC-SCNC: 3 MMOL/L (ref 3–18)
BUN SERPL-MCNC: 12 MG/DL (ref 7–18)
BUN/CREAT SERPL: 15 (ref 12–20)
CALCIUM SERPL-MCNC: 9.8 MG/DL (ref 8.5–10.1)
CHLORIDE SERPL-SCNC: 99 MMOL/L (ref 100–111)
CHOLEST SERPL-MCNC: 162 MG/DL
CO2 SERPL-SCNC: 29 MMOL/L (ref 21–32)
CREAT SERPL-MCNC: 0.82 MG/DL (ref 0.6–1.3)
GLUCOSE SERPL-MCNC: 107 MG/DL (ref 74–99)
HDLC SERPL-MCNC: 72 MG/DL (ref 40–60)
HDLC SERPL: 2.3 (ref 0–5)
LDLC SERPL CALC-MCNC: 69.6 MG/DL (ref 0–100)
LIPID PANEL: ABNORMAL
POTASSIUM SERPL-SCNC: 4.7 MMOL/L (ref 3.5–5.5)
SODIUM SERPL-SCNC: 131 MMOL/L (ref 136–145)
TRIGL SERPL-MCNC: 102 MG/DL
VLDLC SERPL CALC-MCNC: 20.4 MG/DL

## 2024-07-26 PROCEDURE — 80048 BASIC METABOLIC PNL TOTAL CA: CPT

## 2024-07-26 PROCEDURE — 80061 LIPID PANEL: CPT

## 2024-07-26 PROCEDURE — 36415 COLL VENOUS BLD VENIPUNCTURE: CPT

## 2024-07-30 SDOH — HEALTH STABILITY: PHYSICAL HEALTH: ON AVERAGE, HOW MANY MINUTES DO YOU ENGAGE IN EXERCISE AT THIS LEVEL?: 20 MIN

## 2024-07-30 SDOH — HEALTH STABILITY: PHYSICAL HEALTH: ON AVERAGE, HOW MANY DAYS PER WEEK DO YOU ENGAGE IN MODERATE TO STRENUOUS EXERCISE (LIKE A BRISK WALK)?: 3 DAYS

## 2024-07-30 ASSESSMENT — LIFESTYLE VARIABLES
HOW OFTEN DO YOU HAVE A DRINK CONTAINING ALCOHOL: MONTHLY OR LESS
HOW OFTEN DO YOU HAVE A DRINK CONTAINING ALCOHOL: 2
HOW MANY STANDARD DRINKS CONTAINING ALCOHOL DO YOU HAVE ON A TYPICAL DAY: 1
HOW OFTEN DO YOU HAVE SIX OR MORE DRINKS ON ONE OCCASION: 1
HOW MANY STANDARD DRINKS CONTAINING ALCOHOL DO YOU HAVE ON A TYPICAL DAY: 1 OR 2

## 2024-07-30 ASSESSMENT — PATIENT HEALTH QUESTIONNAIRE - PHQ9
SUM OF ALL RESPONSES TO PHQ QUESTIONS 1-9: 0
1. LITTLE INTEREST OR PLEASURE IN DOING THINGS: NOT AT ALL
2. FEELING DOWN, DEPRESSED OR HOPELESS: NOT AT ALL
SUM OF ALL RESPONSES TO PHQ9 QUESTIONS 1 & 2: 0
SUM OF ALL RESPONSES TO PHQ QUESTIONS 1-9: 0

## 2024-07-30 NOTE — RESULT ENCOUNTER NOTE
AWV 8/2/2024  ; HDL 72; LDL 69  Hyponatremia 135-131    Digna, please call patient and request that she go back to the lab to have additional lab work done due to low sodium levels.  She needs to go to the lab before 8 AM because one of the testing on doing requires early morning check.  If she can do this tomorrow that would be perfect as her follow-up appointment with me is on 8/2/2024.  Thank you

## 2024-07-31 ENCOUNTER — HOSPITAL ENCOUNTER (OUTPATIENT)
Facility: HOSPITAL | Age: 69
Setting detail: SPECIMEN
Discharge: HOME | End: 2024-08-03
Payer: MEDICARE

## 2024-07-31 DIAGNOSIS — E87.1 HYPONATREMIA: ICD-10-CM

## 2024-07-31 LAB
ANION GAP SERPL CALC-SCNC: 5 MMOL/L (ref 3–18)
BUN SERPL-MCNC: 12 MG/DL (ref 7–18)
BUN/CREAT SERPL: 14 (ref 12–20)
CALCIUM SERPL-MCNC: 9.5 MG/DL (ref 8.5–10.1)
CHLORIDE SERPL-SCNC: 96 MMOL/L (ref 100–111)
CO2 SERPL-SCNC: 29 MMOL/L (ref 21–32)
CREAT SERPL-MCNC: 0.85 MG/DL (ref 0.6–1.3)
GLUCOSE SERPL-MCNC: 104 MG/DL (ref 74–99)
OSMOLALITY SERPL: 278 MOSM/KG H2O (ref 280–301)
POTASSIUM SERPL-SCNC: 4.7 MMOL/L (ref 3.5–5.5)
SODIUM SERPL-SCNC: 130 MMOL/L (ref 136–145)
TSH SERPL DL<=0.05 MIU/L-ACNC: 1.95 UIU/ML (ref 0.36–3.74)

## 2024-07-31 PROCEDURE — 83930 ASSAY OF BLOOD OSMOLALITY: CPT

## 2024-07-31 PROCEDURE — 82533 TOTAL CORTISOL: CPT

## 2024-07-31 PROCEDURE — 36415 COLL VENOUS BLD VENIPUNCTURE: CPT

## 2024-07-31 PROCEDURE — 84443 ASSAY THYROID STIM HORMONE: CPT

## 2024-07-31 PROCEDURE — 80048 BASIC METABOLIC PNL TOTAL CA: CPT

## 2024-08-01 LAB — CORTIS AM PEAK SERPL-MCNC: 29.6 UG/DL (ref 4.3–22.45)

## 2024-08-02 ENCOUNTER — OFFICE VISIT (OUTPATIENT)
Facility: CLINIC | Age: 69
End: 2024-08-02
Payer: MEDICARE

## 2024-08-02 VITALS
TEMPERATURE: 97 F | WEIGHT: 107.8 LBS | HEIGHT: 61 IN | SYSTOLIC BLOOD PRESSURE: 124 MMHG | OXYGEN SATURATION: 98 % | DIASTOLIC BLOOD PRESSURE: 65 MMHG | HEART RATE: 59 BPM | BODY MASS INDEX: 20.35 KG/M2 | RESPIRATION RATE: 17 BRPM

## 2024-08-02 DIAGNOSIS — Z23 NEED FOR PROPHYLACTIC VACCINATION WITH TETANUS-DIPHTHERIA (TD): ICD-10-CM

## 2024-08-02 DIAGNOSIS — E87.1 HYPONATREMIA: ICD-10-CM

## 2024-08-02 DIAGNOSIS — I24.9 ACUTE CORONARY SYNDROME (HCC): ICD-10-CM

## 2024-08-02 DIAGNOSIS — J45.20 MILD INTERMITTENT ASTHMA WITHOUT COMPLICATION: ICD-10-CM

## 2024-08-02 DIAGNOSIS — I25.119 ATHEROSCLEROSIS OF NATIVE CORONARY ARTERY OF NATIVE HEART WITH ANGINA PECTORIS (HCC): ICD-10-CM

## 2024-08-02 DIAGNOSIS — G43.019 INTRACTABLE MIGRAINE WITHOUT AURA AND WITHOUT STATUS MIGRAINOSUS: ICD-10-CM

## 2024-08-02 DIAGNOSIS — R79.89 ELEVATED CORTISOL LEVEL: ICD-10-CM

## 2024-08-02 DIAGNOSIS — Z00.00 MEDICARE ANNUAL WELLNESS VISIT, SUBSEQUENT: Primary | ICD-10-CM

## 2024-08-02 DIAGNOSIS — R00.1 BRADYCARDIA, UNSPECIFIED: ICD-10-CM

## 2024-08-02 DIAGNOSIS — M85.80 OSTEOPENIA, UNSPECIFIED LOCATION: ICD-10-CM

## 2024-08-02 DIAGNOSIS — E78.5 HYPERLIPIDEMIA LDL GOAL <100: ICD-10-CM

## 2024-08-02 DIAGNOSIS — Z71.89 ACP (ADVANCE CARE PLANNING): ICD-10-CM

## 2024-08-02 DIAGNOSIS — I25.10 CORONARY ARTERY DISEASE INVOLVING NATIVE CORONARY ARTERY OF NATIVE HEART WITHOUT ANGINA PECTORIS: ICD-10-CM

## 2024-08-02 DIAGNOSIS — R41.3 MEMORY CHANGES: ICD-10-CM

## 2024-08-02 DIAGNOSIS — D23.30: ICD-10-CM

## 2024-08-02 PROCEDURE — 1123F ACP DISCUSS/DSCN MKR DOCD: CPT | Performed by: NURSE PRACTITIONER

## 2024-08-02 PROCEDURE — G8420 CALC BMI NORM PARAMETERS: HCPCS | Performed by: NURSE PRACTITIONER

## 2024-08-02 PROCEDURE — G0439 PPPS, SUBSEQ VISIT: HCPCS | Performed by: NURSE PRACTITIONER

## 2024-08-02 PROCEDURE — G8399 PT W/DXA RESULTS DOCUMENT: HCPCS | Performed by: NURSE PRACTITIONER

## 2024-08-02 PROCEDURE — 1090F PRES/ABSN URINE INCON ASSESS: CPT | Performed by: NURSE PRACTITIONER

## 2024-08-02 PROCEDURE — 99214 OFFICE O/P EST MOD 30 MIN: CPT | Performed by: NURSE PRACTITIONER

## 2024-08-02 PROCEDURE — 3017F COLORECTAL CA SCREEN DOC REV: CPT | Performed by: NURSE PRACTITIONER

## 2024-08-02 PROCEDURE — 99497 ADVNCD CARE PLAN 30 MIN: CPT | Performed by: NURSE PRACTITIONER

## 2024-08-02 PROCEDURE — 1036F TOBACCO NON-USER: CPT | Performed by: NURSE PRACTITIONER

## 2024-08-02 PROCEDURE — G8428 CUR MEDS NOT DOCUMENT: HCPCS | Performed by: NURSE PRACTITIONER

## 2024-08-02 RX ORDER — TETANUS AND DIPHTHERIA TOXOIDS ADSORBED 2; 2 [LF]/.5ML; [LF]/.5ML
0.5 INJECTION INTRAMUSCULAR ONCE
Qty: 0.5 ML | Refills: 0 | Status: SHIPPED | OUTPATIENT
Start: 2024-08-02 | End: 2024-08-02

## 2024-08-02 RX ORDER — RESPIRATORY SYNCYTIAL VISUS VACCINE RECOMBINANT, ADJUVANTED 120MCG/0.5
0.5 KIT INTRAMUSCULAR ONCE
Qty: 0.5 ML | Refills: 0 | Status: SHIPPED | OUTPATIENT
Start: 2024-08-02 | End: 2024-08-02

## 2024-08-02 NOTE — PROGRESS NOTES
Lizbeth Zaragoza presents today for   Chief Complaint   Patient presents with    Medicare AWV           \"Have you been to the ER, urgent care clinic since your last visit?  Hospitalized since your last visit?\"    NO    “Have you seen or consulted any other health care providers outside of [facility] since your last visit?”    NO             
Medicare Annual Wellness Visit    Lizbeth Zaragoza is here for Medicare AWV    Assessment & Plan   Medicare annual wellness visit, subsequent  Assessment & Plan:  [facility] Wellness: Reviewed all HM and ordered tests/imaging appropriately. Reviewed care teams and medications with updates.     Reviewed and updated Healthcare Decision maker    HM:  She is instructed to receive her tetanus and RSV vaccines from her local pharmacy. She is also due for a COVID-19 booster in October 2024 and should continue receiving her COVID-19 booster annually with her influenza vaccine.  Colonoscopy 2/2029    Specialists:  Dr Sethi, cardio  Dr Little  Orders:  -     [place] Advanced Care Planning (16-30 minutes) [92426]  -     diptheria-tetanus toxoids (TDVAX) 2-2 LF/0.5ML injection; Inject 0.5 mLs into the muscle once for 1 dose, Disp-0.5 mL, R-0Print  -     respiratory syncytial vaccine, adjuvanted (AREXVY) 120 MCG/0.5ML injection; Inject 0.5 mLs into the muscle once for 1 dose, Disp-0.5 mL, R-0Print  -     DO NOT RESUSCITATE (DNR)  ACP (advance care planning)  Assessment & Plan:  Advanced Care Planning: Patient educated on the importance of an advanced care plan and paperwork was given to the patient today. Asked patient to read over the information and bring back at next appointment.    elects DNR order - completed portable DNR form & placed order  Dtr was present during conversation  I confirmed pt knew what she was requesting and verified she understood her own wishes.     Pt was provided with copy of DNR  Orders:  -     [place] Advanced Care Planning (16-30 minutes) [04247]  -     DO NOT RESUSCITATE (DNR)  Need for prophylactic vaccination with tetanus-diphtheria (Td)  -     diptheria-tetanus toxoids (TDVAX) 2-2 LF/0.5ML injection; Inject 0.5 mLs into the muscle once for 1 dose, Disp-0.5 mL, R-0Print  Hyponatremia  Assessment & Plan:  NA level 130-noted steady decline  Obtained additional labs-  AM Cortisol mildly increased 29 
arthroscopy    ORTHOPEDIC SURGERY      carpal tunnel left     PELVIC LAPAROSCOPY      X2    TONSILLECTOMY       Allergies and Intolerances:   No Known Allergies  Family History:   Family History   Problem Relation Age of Onset    Cancer Mother     Heart Disease Father     Cancer Father         lung ca    Migraines Father     No Known Problems Sister     No Known Problems Brother     Diabetes Maternal Aunt     No Known Problems Maternal Uncle     No Known Problems Paternal Aunt     Heart Disease Paternal Uncle     Heart Disease Paternal Uncle     Hypertension Maternal Grandmother     No Known Problems Maternal Grandfather     No Known Problems Paternal Grandmother     Heart Disease Paternal Grandfather     Cancer Other      Social History:   She  reports that she has never smoked. She has never used smokeless tobacco.   Social History     Substance and Sexual Activity   Alcohol Use Yes    Comment: I'll have a glass of wine periodically     Immunization History:  Immunization History   Administered Date(s) Administered    COVID-19, MODERNA BLUE border, Primary or Immunocompromised, (age 12y+), IM, 100 mcg/0.5mL 03/04/2021, 04/01/2021, 10/29/2021    Influenza Virus Vaccine 12/12/2003    Influenza, FLUAD, (age 65 y+), Adjuvanted, 0.5mL 09/22/2020    Influenza, Triv, inactivated, subunit, adjuvanted, IM (Fluad 65 yrs and older) 12/05/2018, 12/11/2019    PPD Test 08/27/2019    Pneumococcal, PCV-13, PREVNAR 13, (age 6w+), IM, 0.5mL 12/11/2019, 09/30/2020    Pneumococcal, PPSV23, PNEUMOVAX 23, (age 2y+), SC/IM, 0.5mL 07/16/2021    Td vaccine (adult) 05/05/2006    Zoster Live (Zostavax) 10/16/2015         No follow-ups on file.      Dr. Yeimi Whitman, PAMELLAP-C, DNP  [facility]     The patient (or guardian, if applicable) and other individuals in attendance with the patient were advised that Artificial Intelligence will be utilized during this visit to record and process the conversation to generate a clinical 
specialist- no acute findings meriting change in the plan  Dr Thomas, cardio  Cont statin  Cont Plavix  9. Acute coronary syndrome (HCC)  Assessment & Plan:   Monitored by specialist- no acute findings meriting change in the plan   2016, stent, right coronary artery, 99% occlusion.   Dr Sethi  10. Coronary artery disease involving native coronary artery of native heart without angina pectoris  Assessment & Plan:  Cont statin therapy  Cont Plavix therapy  11. Intractable migraine without aura and without status migrainosus  Assessment & Plan:   Well-controlled, lifestyle modifications recommended   Not requiring Imitrex at this time  12. Mild intermittent asthma without complication  Assessment & Plan:   Well-controlled, continue current medications   Not requiring rescue inhaler  13. Squamous acanthoma of face  Assessment & Plan:   Monitored by specialist- no acute findings meriting change in the plan   Dr Paiz, derm  14. Osteopenia, unspecified location  Assessment & Plan:  DEXA 2022 confirmed  Repeat DEXA 2027  Encouraged calcium and Vit D supplements ie Caltrate  Stay active   Avoid falls  15. Bradycardia, unspecified  Assessment & Plan:   Monitored by specialist- no acute findings meriting change in the plan   Asymptomatic  Dr Sethi      Reviewed medication and completed medication reconciliation with the patient. Reviewed side effects of medications with the patient. Questions were answered and patient verb understanding.    Past Medical History:   Diagnosis Date    Acute coronary syndrome (HCC) 06/06/2016    stent, right coronary artery, 99% occlusion. Dr Thomas    Allergic rhinitis     Asthma     Atherosclerotic heart disease of native coronary artery with unspecified angina pectoris 8/4/2023    Bradycardia 1/18/2021    Bradycardia, unspecified 1/18/2021    CAD (coronary artery disease)     Cancer ([facility]) 1986    cervical    Carpal tunnel syndrome     Dyslipidemia     mild    Headache Migraines

## 2024-08-05 PROBLEM — Z71.89 ACP (ADVANCE CARE PLANNING): Status: ACTIVE | Noted: 2024-08-05

## 2024-08-05 PROBLEM — M62.838 NECK MUSCLE SPASM: Status: RESOLVED | Noted: 2023-08-08 | Resolved: 2024-08-05

## 2024-08-05 PROBLEM — R79.89 ELEVATED CORTISOL LEVEL: Status: ACTIVE | Noted: 2024-08-05

## 2024-08-05 PROBLEM — J30.9 ALLERGIC RHINITIS: Status: RESOLVED | Noted: 2023-08-23 | Resolved: 2024-08-05

## 2024-08-05 NOTE — ASSESSMENT & PLAN NOTE
Well-controlled, continue current medications  ; HDL 72; LDL 69  Alyssa level 12m  Cont statin therapy    Reduce fried fatty or oily foods in diet  Limit red meats, processed foods, and alcohol.    Limit fast food  Increase physical activity to 60 minutes of moderate intensity aerobic exercise per week.  Increase water intake  Reduce alcohol intake    How to raise HDL if low:  Consume oats, beans, legumes, olive oil, whole grains, nuts, seeds, fatty fish, and berries.  Lose weight/fat  Reduce alcohol consumption  Smoking cessation   98

## 2024-08-05 NOTE — ASSESSMENT & PLAN NOTE
Monitored by specialist- no acute findings meriting change in the plan   2016, stent, right coronary artery, 99% occlusion.   Dr Sethi

## 2024-08-05 NOTE — ASSESSMENT & PLAN NOTE
Memory issues have been noted, including repeating questions and difficulty with short-term memory.   SLUMS examination completed. Revealed score=16. 1-20=dementia range for those with a HS education.     Placed referral for neuropsych eval.    See scanned media for SLUMs examination results.

## 2024-08-05 NOTE — ASSESSMENT & PLAN NOTE
DEXA 2022 confirmed  Repeat DEXA 2027  Encouraged calcium and Vit D supplements ie Caltrate  Stay active   Avoid falls

## 2024-08-05 NOTE — ASSESSMENT & PLAN NOTE
[facility] Wellness: Reviewed all HM and ordered tests/imaging appropriately. Reviewed care teams and medications with updates.     Reviewed and updated Healthcare Decision maker    HM:  She is instructed to receive her tetanus and RSV vaccines from her local pharmacy. She is also due for a COVID-19 booster in October 2024 and should continue receiving her COVID-19 booster annually with her influenza vaccine.  Colonoscopy 2/2029    Specialists:  Dr Sethi, cardio  Dr Little

## 2024-08-05 NOTE — ASSESSMENT & PLAN NOTE
Advanced Care Planning: Patient educated on the importance of an advanced care plan and paperwork was given to the patient today. Asked patient to read over the information and bring back at next appointment.    elects DNR order - completed portable DNR form & placed order    Pt was provided with copy of DNR

## 2024-08-05 NOTE — ASSESSMENT & PLAN NOTE
Her cortisol levels are slightly elevated, which could be due to stress or potential adrenal gland issues. Additional labs may be ordered to further investigate the cause. If necessary, a referral to an endocrinologist will be considered.

## 2024-08-05 NOTE — ASSESSMENT & PLAN NOTE
NA level 130-noted steady decline  Obtained additional labs-  AM Cortisol mildly increased 29 (22-max)  Serum osmolality mildly decreased 278 (280-minimum)  TSH normal 1.95    Added additional labs to be obtained this week-  Uric acid  Sodium urine  Urine osmolality  ACTH    Will refer to endo if cause for hyponatremia undetermined    8/2023 ov note:  Level 133  Most likely related to dehydration.   Encouraged to stay well hydrated  Alyssa level 6w (week of 9/18/23)

## 2024-08-05 NOTE — ASSESSMENT & PLAN NOTE
Monitored by specialist- no acute findings meriting change in the plan  Dr Thomas, cardio  Cont statin  Cont Plavix

## 2024-08-08 ENCOUNTER — TELEPHONE (OUTPATIENT)
Facility: CLINIC | Age: 69
End: 2024-08-08

## 2024-08-08 DIAGNOSIS — R41.3 MEMORY CHANGES: Primary | ICD-10-CM

## 2024-08-08 NOTE — TELEPHONE ENCOUNTER
Dr Palomino with [facility] [place] is asking for office note and ct scans pertaing to memory loss to be faxed to them   Fax# [phone]

## 2024-08-09 ENCOUNTER — TELEPHONE (OUTPATIENT)
Facility: CLINIC | Age: 69
End: 2024-08-09

## 2024-08-09 RX ORDER — CYCLOBENZAPRINE HCL 10 MG
10 TABLET ORAL 3 TIMES DAILY PRN
Qty: 10 TABLET | Refills: 0 | Status: SHIPPED | OUTPATIENT
Start: 2024-08-09

## 2024-08-09 NOTE — TELEPHONE ENCOUNTER
Pt called asking why a CT was ordered for her and if her ins. Will cover it   Please advise   [phone]

## 2024-08-09 NOTE — TELEPHONE ENCOUNTER
Patient needs to contact her insurance to discuss coverage. Please see previous message stating that neuropsychologist is requesting her to have a head CT

## 2024-08-09 NOTE — TELEPHONE ENCOUNTER
Pls send ov note from 8/2/24. Pls print out SLUMS (in Media) and send to specialist as well. I have placed an order for Brain CT. Pls call pt with central scheduling number to call and schedule. Thank you     Diagnosis Orders   1. Memory changes  CT HEAD WO CONTRAST

## 2024-08-29 ENCOUNTER — HOSPITAL ENCOUNTER (OUTPATIENT)
Facility: HOSPITAL | Age: 69
Setting detail: SPECIMEN
Discharge: HOME | End: 2024-08-29
Payer: MEDICARE

## 2024-08-29 DIAGNOSIS — E87.1 HYPONATREMIA: ICD-10-CM

## 2024-08-29 LAB
OSMOLALITY UR: 250 MOSM/KG H2O
SODIUM UR-SCNC: 33 MMOL/L (ref 20–110)
URATE SERPL-MCNC: 3.2 MG/DL (ref 2.6–7.2)

## 2024-08-29 PROCEDURE — 84300 ASSAY OF URINE SODIUM: CPT

## 2024-08-29 PROCEDURE — 84550 ASSAY OF BLOOD/URIC ACID: CPT

## 2024-08-29 PROCEDURE — 82024 ASSAY OF ACTH: CPT

## 2024-08-29 PROCEDURE — 83935 ASSAY OF URINE OSMOLALITY: CPT

## 2024-08-29 PROCEDURE — 36415 COLL VENOUS BLD VENIPUNCTURE: CPT

## 2024-08-30 LAB — ACTH PLAS-MCNC: 46.3 PG/ML (ref 7.2–63.3)

## 2024-09-03 NOTE — RESULT ENCOUNTER NOTE
Please inform patient all follow-up labs were normal.  Uric acid was 3.2, urine osmolarity 250: Sodium urine random was 33, and ACTH 46.  Thank you

## 2024-09-04 ENCOUNTER — HOSPITAL ENCOUNTER (OUTPATIENT)
Facility: HOSPITAL | Age: 69
Discharge: HOME | End: 2024-09-07
Payer: MEDICARE

## 2024-09-04 DIAGNOSIS — R41.3 MEMORY CHANGES: ICD-10-CM

## 2024-09-04 PROBLEM — Z00.00 MEDICARE ANNUAL WELLNESS VISIT, SUBSEQUENT: Status: RESOLVED | Noted: 2023-08-04 | Resolved: 2024-09-04

## 2024-09-04 PROCEDURE — 70450 CT HEAD/BRAIN W/O DYE: CPT

## 2024-09-09 ENCOUNTER — TELEPHONE (OUTPATIENT)
Facility: CLINIC | Age: 69
End: 2024-09-09

## 2024-09-12 ENCOUNTER — TRANSCRIBE ORDERS (OUTPATIENT)
Facility: HOSPITAL | Age: 69
End: 2024-09-12

## 2024-09-12 DIAGNOSIS — Z12.31 VISIT FOR SCREENING MAMMOGRAM: Primary | ICD-10-CM

## 2024-09-13 RX ORDER — METOPROLOL TARTRATE 25 MG/1
TABLET, FILM COATED ORAL
Qty: 90 TABLET | Refills: 3 | Status: SHIPPED | OUTPATIENT
Start: 2024-09-13

## 2024-09-19 ENCOUNTER — TELEPHONE (OUTPATIENT)
Facility: CLINIC | Age: 69
End: 2024-09-19

## 2024-10-23 ENCOUNTER — HOSPITAL ENCOUNTER (OUTPATIENT)
Facility: HOSPITAL | Age: 69
Discharge: HOME | End: 2024-10-26
Payer: MEDICARE

## 2024-10-23 VITALS — WEIGHT: 103 LBS | HEIGHT: 61 IN | BODY MASS INDEX: 19.45 KG/M2

## 2024-10-23 DIAGNOSIS — Z12.31 VISIT FOR SCREENING MAMMOGRAM: ICD-10-CM

## 2024-10-23 PROCEDURE — 77063 BREAST TOMOSYNTHESIS BI: CPT

## 2024-10-28 ENCOUNTER — TELEPHONE (OUTPATIENT)
Facility: CLINIC | Age: 69
End: 2024-10-28

## 2024-10-28 NOTE — TELEPHONE ENCOUNTER
Patient called inquiring about getting Pneumonia vaccine, she states that [facility] told her it would be $100.  Donovan the chart patient received Prevnar 23 on 7/16/2021, patient is asking if she needs an updated vaccine. Please advise.

## 2024-10-30 NOTE — TELEPHONE ENCOUNTER
Spoke w/ pt to inform that per her chart() she is not due for pneumonia vaccine, pt verbalized understanding.

## 2024-11-05 ENCOUNTER — TELEPHONE (OUTPATIENT)
Facility: CLINIC | Age: 69
End: 2024-11-05

## 2024-11-05 DIAGNOSIS — G43.019 INTRACTABLE MIGRAINE WITHOUT AURA AND WITHOUT STATUS MIGRAINOSUS: Primary | ICD-10-CM

## 2024-11-05 RX ORDER — SUMATRIPTAN 100 MG/1
100 TABLET, FILM COATED ORAL DAILY PRN
Qty: 9 TABLET | Refills: 5 | Status: SHIPPED | OUTPATIENT
Start: 2024-11-05

## 2024-11-05 NOTE — TELEPHONE ENCOUNTER
Pt called states she was prescribed SUMATRIPTAN   For migraines , pt said she has not needed it , until now , she said she did not realize she was down to half a pill   She is asking if a refill to be sent   DIONICIO ALBRECH[address]

## 2024-11-05 NOTE — TELEPHONE ENCOUNTER
Diagnosis Orders   1. Intractable migraine without aura and without status migrainosus  SUMAtriptan (IMITREX) 100 MG tablet        Requested Prescriptions     Signed Prescriptions Disp Refills    SUMAtriptan (IMITREX) 100 MG tablet 9 tablet 5     Sig: Take 1 tablet by mouth daily as needed for Migraine     Authorizing Provider: HOA NELSON

## 2024-11-05 NOTE — TELEPHONE ENCOUNTER
Last Office visit:  8/24/2024    Last Filled: 1/31/2022; Qty 9 w/ 5 refills     Follow up visit:    Future Appointments   Date Time Provider Department Center   12/12/2024 10:40 AM Jarad Sethi MD [facility] BS [facility]   8/1/2025  8:00 AM IOC LAB VISIT [facility] DEP   8/8/2025  8:00 AM Yeimi Whitman DNP [facility] DEP

## 2024-11-12 RX ORDER — NITROGLYCERIN 0.4 MG/1
0.4 TABLET SUBLINGUAL PRN
Qty: 25 TABLET | Refills: 3 | Status: SHIPPED | OUTPATIENT
Start: 2024-11-12

## 2024-12-12 ENCOUNTER — OFFICE VISIT (OUTPATIENT)
Age: 69
End: 2024-12-12
Payer: MEDICARE

## 2024-12-12 VITALS
BODY MASS INDEX: 21.01 KG/M2 | SYSTOLIC BLOOD PRESSURE: 122 MMHG | OXYGEN SATURATION: 98 % | HEART RATE: 50 BPM | HEIGHT: 60 IN | WEIGHT: 107 LBS | DIASTOLIC BLOOD PRESSURE: 68 MMHG

## 2024-12-12 DIAGNOSIS — I25.119 ATHEROSCLEROSIS OF NATIVE CORONARY ARTERY OF NATIVE HEART WITH ANGINA PECTORIS (HCC): ICD-10-CM

## 2024-12-12 DIAGNOSIS — E78.5 HYPERLIPIDEMIA LDL GOAL <100: Primary | ICD-10-CM

## 2024-12-12 PROCEDURE — 1090F PRES/ABSN URINE INCON ASSESS: CPT | Performed by: INTERNAL MEDICINE

## 2024-12-12 PROCEDURE — 3017F COLORECTAL CA SCREEN DOC REV: CPT | Performed by: INTERNAL MEDICINE

## 2024-12-12 PROCEDURE — 1123F ACP DISCUSS/DSCN MKR DOCD: CPT | Performed by: INTERNAL MEDICINE

## 2024-12-12 PROCEDURE — 99214 OFFICE O/P EST MOD 30 MIN: CPT | Performed by: INTERNAL MEDICINE

## 2024-12-12 PROCEDURE — G8484 FLU IMMUNIZE NO ADMIN: HCPCS | Performed by: INTERNAL MEDICINE

## 2024-12-12 PROCEDURE — G8420 CALC BMI NORM PARAMETERS: HCPCS | Performed by: INTERNAL MEDICINE

## 2024-12-12 PROCEDURE — G8399 PT W/DXA RESULTS DOCUMENT: HCPCS | Performed by: INTERNAL MEDICINE

## 2024-12-12 PROCEDURE — G8427 DOCREV CUR MEDS BY ELIG CLIN: HCPCS | Performed by: INTERNAL MEDICINE

## 2024-12-12 PROCEDURE — 1036F TOBACCO NON-USER: CPT | Performed by: INTERNAL MEDICINE

## 2024-12-12 PROCEDURE — 1126F AMNT PAIN NOTED NONE PRSNT: CPT | Performed by: INTERNAL MEDICINE

## 2024-12-12 ASSESSMENT — PATIENT HEALTH QUESTIONNAIRE - PHQ9
1. LITTLE INTEREST OR PLEASURE IN DOING THINGS: NOT AT ALL
SUM OF ALL RESPONSES TO PHQ QUESTIONS 1-9: 0
SUM OF ALL RESPONSES TO PHQ QUESTIONS 1-9: 0
2. FEELING DOWN, DEPRESSED OR HOPELESS: NOT AT ALL
SUM OF ALL RESPONSES TO PHQ9 QUESTIONS 1 & 2: 0
SUM OF ALL RESPONSES TO PHQ QUESTIONS 1-9: 0
SUM OF ALL RESPONSES TO PHQ QUESTIONS 1-9: 0

## 2024-12-16 PROBLEM — G31.84 MILD NEUROCOGNITIVE DISORDER: Status: ACTIVE | Noted: 2024-11-06

## 2024-12-26 NOTE — PROGRESS NOTES
Lizbeth Zaragoza presents today for   Chief Complaint   Patient presents with    Follow-up       Lizbeth Zaragoza preferred language for health care discussion is english/other.    Is someone accompanying this pt? no    Is the patient using any DME equipment during OV? no    Depression Screening:  Depression: Not at risk (12/12/2024)    PHQ-2     PHQ-2 Score: 0        Learning Assessment:  Who is the primary learner? Patient    What is the preferred language for health care of the primary learner? ENGLISH    How does the primary learner prefer to learn new concepts? DEMONSTRATION    Answered By patient    Relationship to Learner SELF           Pt currently taking Anticoagulant therapy? no    Pt currently taking Antiplatelet therapy ? Aspirin  plavix      Coordination of Care:  1. Have you been to the ER, urgent care clinic since your last visit? Hospitalized since your last visit? no    2. Have you seen or consulted any other health care providers outside of the [facility] since your last visit? Include any pap smears or colon screening. no    
artery disease)     Cancer (HCC)     cervical    Carpal tunnel syndrome     Dyslipidemia     mild    Headache Migraines    Hearing loss in right ear     sudden right hearing loss associated with vertigo    History of coronary artery stent placement 2016    History of migraine headaches 2018    Hyperlipidemia LDL goal <100 2016    Impingement syndrome of shoulder, bursitis, left shoulder 2010    Mild intermittent asthma without complication 2021    Squamous acanthoma of face 2021       Past Surgical History:   Procedure Laterality Date    ENDOSCOPY, COLON, DIAGNOSTIC      HX  SECTION      X2    HX COLONOSCOPY  ,     HX CORONARY STENT PLACEMENT  16    3.0 x 18 mm Xience stent and 3.0 x 8 mm Xience stent    HX HEART CATHETERIZATION  16    99% RCA    HX HYSTERECTOMY      for cervical cancer; pelvic exams [facility]    HX ORTHOPAEDIC      left knee surgery arthroscopy    HX ORTHOPAEDIC      carpal tunnel left     HX PELVIC LAPAROSCOPY      X2    HX TONSILLECTOMY       Current Outpatient Medications   Medication Sig    metoprolol tartrate (LOPRESSOR) 25 mg tablet TAKE 1/2 TABLET BY MOUTH EVERY 12 HOURS    clopidogreL (PLAVIX) 75 mg tab TAKE ONE TABLET BY MOUTH DAILY    albuterol (PROVENTIL HFA, VENTOLIN HFA, PROAIR HFA) 90 mcg/actuation inhaler Take 1 Puff by inhalation every six (6) hours as needed for Wheezing. 1-2 puffs every 6 hours as needed  Indications: asthma attack    cyclobenzaprine (FLEXERIL) 10 mg tablet Take 0.5 Tablets by mouth three (3) times daily as needed for Muscle Spasm(s).    SUMAtriptan (IMITREX) 100 mg tablet Take 1 Tablet by mouth daily as needed for Migraine.    atorvastatin (LIPITOR) 40 mg tablet Take 1 Tablet by mouth nightly.    nitroglycerin (NITROSTAT) 0.4 mg SL tablet 1 Tablet by SubLINGual route as needed for Chest Pain.    aspirin 81 mg chewable tablet Take 1 Tab by mouth daily.    multivitamin (ONE A DAY) tablet Take

## 2025-01-16 ENCOUNTER — TELEPHONE (OUTPATIENT)
Age: 70
End: 2025-01-16

## 2025-01-16 NOTE — TELEPHONE ENCOUNTER
Received a fax from [facility] requesting cardiac clearance for Phaco w/ IOL. Procedure date 4-1-2025.    Verbal order and read back per Jarad Sethi MD  Patient can proceed with planned surgery and anesthesia in an outpatient setting from a CV standpoint.    Clearance form faxed to [phone].

## 2025-03-18 ENCOUNTER — TELEPHONE (OUTPATIENT)
Facility: CLINIC | Age: 70
End: 2025-03-18

## 2025-03-18 ENCOUNTER — OFFICE VISIT (OUTPATIENT)
Facility: CLINIC | Age: 70
End: 2025-03-18
Payer: MEDICARE

## 2025-03-18 VITALS
HEIGHT: 60 IN | RESPIRATION RATE: 20 BRPM | SYSTOLIC BLOOD PRESSURE: 110 MMHG | OXYGEN SATURATION: 98 % | WEIGHT: 107 LBS | TEMPERATURE: 96 F | DIASTOLIC BLOOD PRESSURE: 70 MMHG | BODY MASS INDEX: 21.01 KG/M2 | HEART RATE: 52 BPM

## 2025-03-18 DIAGNOSIS — Z01.818 PREOPERATIVE CLEARANCE: Primary | ICD-10-CM

## 2025-03-18 DIAGNOSIS — H25.092 AGE-RELATED INCIPIENT CATARACT OF LEFT EYE: ICD-10-CM

## 2025-03-18 PROCEDURE — 1090F PRES/ABSN URINE INCON ASSESS: CPT | Performed by: NURSE PRACTITIONER

## 2025-03-18 PROCEDURE — G8399 PT W/DXA RESULTS DOCUMENT: HCPCS | Performed by: NURSE PRACTITIONER

## 2025-03-18 PROCEDURE — 99213 OFFICE O/P EST LOW 20 MIN: CPT | Performed by: NURSE PRACTITIONER

## 2025-03-18 PROCEDURE — 3017F COLORECTAL CA SCREEN DOC REV: CPT | Performed by: NURSE PRACTITIONER

## 2025-03-18 PROCEDURE — 1036F TOBACCO NON-USER: CPT | Performed by: NURSE PRACTITIONER

## 2025-03-18 PROCEDURE — 1123F ACP DISCUSS/DSCN MKR DOCD: CPT | Performed by: NURSE PRACTITIONER

## 2025-03-18 PROCEDURE — G8420 CALC BMI NORM PARAMETERS: HCPCS | Performed by: NURSE PRACTITIONER

## 2025-03-18 PROCEDURE — G8428 CUR MEDS NOT DOCUMENT: HCPCS | Performed by: NURSE PRACTITIONER

## 2025-03-18 SDOH — ECONOMIC STABILITY: FOOD INSECURITY: WITHIN THE PAST 12 MONTHS, THE FOOD YOU BOUGHT JUST DIDN'T LAST AND YOU DIDN'T HAVE MONEY TO GET MORE.: NEVER TRUE

## 2025-03-18 SDOH — ECONOMIC STABILITY: FOOD INSECURITY: WITHIN THE PAST 12 MONTHS, YOU WORRIED THAT YOUR FOOD WOULD RUN OUT BEFORE YOU GOT MONEY TO BUY MORE.: NEVER TRUE

## 2025-03-18 ASSESSMENT — PATIENT HEALTH QUESTIONNAIRE - PHQ9
2. FEELING DOWN, DEPRESSED OR HOPELESS: NOT AT ALL
SUM OF ALL RESPONSES TO PHQ QUESTIONS 1-9: 0
SUM OF ALL RESPONSES TO PHQ QUESTIONS 1-9: 0
1. LITTLE INTEREST OR PLEASURE IN DOING THINGS: NOT AT ALL
SUM OF ALL RESPONSES TO PHQ QUESTIONS 1-9: 0
SUM OF ALL RESPONSES TO PHQ QUESTIONS 1-9: 0

## 2025-03-18 NOTE — ASSESSMENT & PLAN NOTE
The patient is scheduled for left eye cataract surgery on 04/01/2025 with Dr. Dover. Anesthesia will be topical/MAC, and the surgery will include IOL-DMK. No GLP-1 medications are currently being taken, so no adjustments are needed. No labs or imaging like EKG are required. She should continue her blood pressure medication and Plavix without interruption.    Pt is cleared for cataract procedure

## 2025-03-18 NOTE — PROGRESS NOTES
Lizbeth Zaragoza presents today for   Chief Complaint   Patient presents with    Pre-op Exam           \"Have you been to the ER, urgent care clinic since your last visit?  Hospitalized since your last visit?\"    NO    “Have you seen or consulted any other health care providers outside of [facility] since your last visit?”    NO            
Disease Paternal Uncle      Social History:   She  reports that she has never smoked. She has never used smokeless tobacco.   Social History     Substance and Sexual Activity   Alcohol Use Yes    Comment: I'll have a glass of wine periodically     Immunization History:  Immunization History   Administered Date(s) Administered    COVID-19, MODERNA BLUE border, Primary or Immunocompromised, (age 12y+), IM, 100 mcg/0.5mL 03/04/2021, 04/01/2021, 10/29/2021    Influenza Virus Vaccine 12/12/2003    Influenza, FLUAD, (age 65 y+), IM, Quadv, 0.5mL 09/22/2020    Influenza, FLUAD, (age 65 y+), IM, Trivalent PF, 0.5mL 12/05/2018, 12/11/2019    PPD Test 08/27/2019    Pneumococcal, PCV-13, PREVNAR 13, (age 6w+), IM, 0.5mL 12/11/2019, 09/30/2020    Pneumococcal, PPSV23, PNEUMOVAX 23, (age 2y+), SC/IM, 0.5mL 07/16/2021    Td vaccine (adult) 05/05/2006    Zoster Live (Zostavax) 10/16/2015         Return if symptoms worsen or fail to improve.      Dr. Yeimi Whitman, PAMELLAP-C, DNP  [facility]     The patient (or guardian, if applicable) and other individuals in attendance with the patient were advised that Artificial Intelligence will be utilized during this visit to record and process the conversation to generate a clinical note. The patient (or guardian, if applicable) and other individuals in attendance at the appointment consented to the use of AI, including the recording.

## 2025-03-19 RX ORDER — CYCLOBENZAPRINE HCL 10 MG
10 TABLET ORAL 3 TIMES DAILY PRN
Qty: 10 TABLET | Refills: 0 | Status: SHIPPED | OUTPATIENT
Start: 2025-03-19

## 2025-03-24 ENCOUNTER — TELEPHONE (OUTPATIENT)
Facility: CLINIC | Age: 70
End: 2025-03-24

## 2025-03-24 DIAGNOSIS — M62.838 MUSCLE SPASM: Primary | ICD-10-CM

## 2025-03-24 RX ORDER — CYCLOBENZAPRINE HCL 10 MG
5 TABLET ORAL 3 TIMES DAILY PRN
Qty: 10 TABLET | Refills: 0 | Status: SHIPPED | OUTPATIENT
Start: 2025-03-24

## 2025-03-24 NOTE — TELEPHONE ENCOUNTER
Pt called stating that script wasn't received at pharmacy pt requested a new script to be sent for   cyclobenzaprine (FLEXERIL) 10 MG tablet     [id number] [place] [address] - P [phone] - F [phone] [[id number]]

## 2025-03-24 NOTE — TELEPHONE ENCOUNTER
Unsure as to why this prescription for Flexeril was not received on 3/19/2025 as it states in her chart that it was sent and received by the pharmacist.    Placed a new order for Flexeril.    Requested Prescriptions     Signed Prescriptions Disp Refills    cyclobenzaprine (FLEXERIL) 10 MG tablet 10 tablet 0     Sig: Take 0.5 tablets by mouth 3 times daily as needed for Muscle spasms     Authorizing Provider: HOA NELSON

## 2025-03-25 NOTE — TELEPHONE ENCOUNTER
Spoke w/ [facility] pharmacy to clarify and per pharmacist she is unsure on the previous message, however medication was received and is ready for .LVM to inform pt.

## 2025-04-17 PROBLEM — Z01.818 PREOPERATIVE CLEARANCE: Status: RESOLVED | Noted: 2025-03-18 | Resolved: 2025-04-17

## 2025-07-18 NOTE — TELEPHONE ENCOUNTER
Pt has upcoming appt. Going to [facility] for labs. Please update labs with a current date. none

## 2025-07-21 NOTE — TELEPHONE ENCOUNTER
PCP: Yeimi Whitman DNP    Last appt:  8/23/2023   Future Appointments   Date Time Provider Department Center   8/1/2025  8:00 AM IOC LAB VISIT [facility]   8/8/2025  8:00 AM Yeimi Whitman DNP [facility]   12/11/2025 10:40 AM Jarad Sethi MD [facility]       Requested Prescriptions     Pending Prescriptions Disp Refills    clopidogrel (PLAVIX) 75 MG tablet [Pharmacy Med Name: CLOPIDOGREL 75MG TABLETS] 90 tablet 3     Sig: TAKE 1 TABLET BY MOUTH DAILY       Request for a 30 or 90 day supply? Provider Discretion    Pharmacy: confirmed    Other Comments: n/a

## 2025-07-22 RX ORDER — CLOPIDOGREL BISULFATE 75 MG/1
75 TABLET ORAL DAILY
Qty: 90 TABLET | Refills: 3 | Status: SHIPPED | OUTPATIENT
Start: 2025-07-22

## 2025-07-29 ENCOUNTER — TELEPHONE (OUTPATIENT)
Facility: CLINIC | Age: 70
End: 2025-07-29

## 2025-07-29 DIAGNOSIS — J45.20 MILD INTERMITTENT ASTHMA WITHOUT COMPLICATION: ICD-10-CM

## 2025-07-29 RX ORDER — ATORVASTATIN CALCIUM 40 MG/1
40 TABLET, FILM COATED ORAL DAILY
Qty: 90 TABLET | Refills: 3 | Status: SHIPPED | OUTPATIENT
Start: 2025-07-29
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
## 2025-07-30 RX ORDER — ALBUTEROL SULFATE 90 UG/1
2 INHALANT RESPIRATORY (INHALATION) EVERY 6 HOURS PRN
Qty: 18 G | Refills: 0 | Status: SHIPPED | OUTPATIENT
Start: 2025-07-30

## 2025-08-01 ENCOUNTER — TELEPHONE (OUTPATIENT)
Facility: CLINIC | Age: 70
End: 2025-08-01

## 2025-08-01 ENCOUNTER — HOSPITAL ENCOUNTER (OUTPATIENT)
Facility: HOSPITAL | Age: 70
Setting detail: SPECIMEN
Discharge: HOME | End: 2025-08-01
Payer: MEDICARE

## 2025-08-01 DIAGNOSIS — E87.1 HYPONATREMIA: Primary | ICD-10-CM

## 2025-08-01 DIAGNOSIS — Z00.00 MEDICARE ANNUAL WELLNESS VISIT, SUBSEQUENT: ICD-10-CM

## 2025-08-01 DIAGNOSIS — E87.1 HYPONATREMIA: ICD-10-CM

## 2025-08-01 DIAGNOSIS — E78.5 HYPERLIPIDEMIA LDL GOAL <100: ICD-10-CM

## 2025-08-01 LAB
ALBUMIN SERPL-MCNC: 4.1 G/DL (ref 3.4–5)
ALBUMIN/GLOB SERPL: 1.9 (ref 0.8–1.7)
ALP SERPL-CCNC: 38 U/L (ref 45–117)
ALT SERPL-CCNC: 22 U/L (ref 10–35)
ANION GAP SERPL CALC-SCNC: 10 MMOL/L (ref 3–18)
AST SERPL-CCNC: 23 U/L (ref 10–38)
BASOPHILS # BLD: 0.07 K/UL (ref 0–0.1)
BASOPHILS NFR BLD: 1.2 % (ref 0–2)
BILIRUB SERPL-MCNC: 0.3 MG/DL (ref 0.2–1)
BUN SERPL-MCNC: 11 MG/DL (ref 6–23)
BUN/CREAT SERPL: 14 (ref 12–20)
CALCIUM SERPL-MCNC: 9.7 MG/DL (ref 8.5–10.1)
CHLORIDE SERPL-SCNC: 101 MMOL/L (ref 98–107)
CHOLEST SERPL-MCNC: 157 MG/DL
CO2 SERPL-SCNC: 27 MMOL/L (ref 21–32)
CREAT SERPL-MCNC: 0.8 MG/DL (ref 0.6–1.3)
DIFFERENTIAL METHOD BLD: ABNORMAL
EOSINOPHIL # BLD: 0.41 K/UL (ref 0–0.4)
EOSINOPHIL NFR BLD: 6.8 % (ref 0–5)
ERYTHROCYTE [DISTWIDTH] IN BLOOD BY AUTOMATED COUNT: 12.9 % (ref 11.6–14.5)
GLOBULIN SER CALC-MCNC: 2.1 G/DL (ref 2–4)
GLUCOSE SERPL-MCNC: 102 MG/DL (ref 74–108)
HCT VFR BLD AUTO: 42.7 % (ref 35–45)
HDLC SERPL-MCNC: 64 MG/DL (ref 40–60)
HDLC SERPL: 2.5 (ref 0–5)
HGB BLD-MCNC: 13.9 G/DL (ref 12–16)
IMM GRANULOCYTES # BLD AUTO: 0.01 K/UL (ref 0–0.04)
IMM GRANULOCYTES NFR BLD AUTO: 0.2 % (ref 0–0.5)
LDLC SERPL CALC-MCNC: 79 MG/DL (ref 0–100)
LYMPHOCYTES # BLD: 1.8 K/UL (ref 0.9–3.6)
LYMPHOCYTES NFR BLD: 30.1 % (ref 21–52)
MCH RBC QN AUTO: 29.7 PG (ref 24–34)
MCHC RBC AUTO-ENTMCNC: 32.6 G/DL (ref 31–37)
MCV RBC AUTO: 91.2 FL (ref 78–100)
MONOCYTES # BLD: 0.61 K/UL (ref 0.05–1.2)
MONOCYTES NFR BLD: 10.2 % (ref 3–10)
NEUTS SEG # BLD: 3.09 K/UL (ref 1.8–8)
NEUTS SEG NFR BLD: 51.5 % (ref 40–73)
NRBC # BLD: 0 K/UL (ref 0–0.01)
NRBC BLD-RTO: 0 PER 100 WBC
PLATELET # BLD AUTO: 239 K/UL (ref 135–420)
PMV BLD AUTO: 10.3 FL (ref 9.2–11.8)
POTASSIUM SERPL-SCNC: 5 MMOL/L (ref 3.5–5.5)
PROT SERPL-MCNC: 6.2 G/DL (ref 6.4–8.2)
RBC # BLD AUTO: 4.68 M/UL (ref 4.2–5.3)
SODIUM SERPL-SCNC: 138 MMOL/L (ref 136–145)
TRIGL SERPL-MCNC: 72 MG/DL (ref 0–150)
VLDLC SERPL CALC-MCNC: 14 MG/DL
WBC # BLD AUTO: 6 K/UL (ref 4.6–13.2)

## 2025-08-01 PROCEDURE — 80053 COMPREHEN METABOLIC PANEL: CPT

## 2025-08-01 PROCEDURE — 36415 COLL VENOUS BLD VENIPUNCTURE: CPT

## 2025-08-01 PROCEDURE — 85025 COMPLETE CBC W/AUTO DIFF WBC: CPT

## 2025-08-01 PROCEDURE — 80061 LIPID PANEL: CPT

## 2025-08-05 SDOH — HEALTH STABILITY: PHYSICAL HEALTH
ON AVERAGE, HOW MANY DAYS PER WEEK DO YOU ENGAGE IN MODERATE TO STRENUOUS EXERCISE (LIKE A BRISK WALK)?: PATIENT DECLINED

## 2025-08-05 ASSESSMENT — PATIENT HEALTH QUESTIONNAIRE - PHQ9
SUM OF ALL RESPONSES TO PHQ QUESTIONS 1-9: 0
2. FEELING DOWN, DEPRESSED OR HOPELESS: NOT AT ALL
SUM OF ALL RESPONSES TO PHQ QUESTIONS 1-9: 0
1. LITTLE INTEREST OR PLEASURE IN DOING THINGS: NOT AT ALL
SUM OF ALL RESPONSES TO PHQ QUESTIONS 1-9: 0
SUM OF ALL RESPONSES TO PHQ QUESTIONS 1-9: 0

## 2025-08-05 ASSESSMENT — LIFESTYLE VARIABLES
HOW OFTEN DO YOU HAVE A DRINK CONTAINING ALCOHOL: MONTHLY OR LESS
HOW MANY STANDARD DRINKS CONTAINING ALCOHOL DO YOU HAVE ON A TYPICAL DAY: 1 OR 2
HOW OFTEN DO YOU HAVE SIX OR MORE DRINKS ON ONE OCCASION: 1
HOW OFTEN DO YOU HAVE A DRINK CONTAINING ALCOHOL: 2
HOW MANY STANDARD DRINKS CONTAINING ALCOHOL DO YOU HAVE ON A TYPICAL DAY: 1

## 2025-08-08 ENCOUNTER — OFFICE VISIT (OUTPATIENT)
Facility: CLINIC | Age: 70
End: 2025-08-08
Payer: MEDICARE

## 2025-08-08 VITALS
SYSTOLIC BLOOD PRESSURE: 112 MMHG | DIASTOLIC BLOOD PRESSURE: 67 MMHG | HEIGHT: 60 IN | RESPIRATION RATE: 17 BRPM | WEIGHT: 108.2 LBS | OXYGEN SATURATION: 96 % | HEART RATE: 57 BPM | TEMPERATURE: 98 F | BODY MASS INDEX: 21.24 KG/M2

## 2025-08-08 DIAGNOSIS — I24.9 ACUTE CORONARY SYNDROME (HCC): ICD-10-CM

## 2025-08-08 DIAGNOSIS — E78.5 HYPERLIPIDEMIA LDL GOAL <100: ICD-10-CM

## 2025-08-08 DIAGNOSIS — D23.30: ICD-10-CM

## 2025-08-08 DIAGNOSIS — M85.80 OSTEOPENIA, UNSPECIFIED LOCATION: ICD-10-CM

## 2025-08-08 DIAGNOSIS — J45.20 MILD INTERMITTENT ASTHMA WITHOUT COMPLICATION: ICD-10-CM

## 2025-08-08 DIAGNOSIS — I25.119 ATHEROSCLEROSIS OF NATIVE CORONARY ARTERY OF NATIVE HEART WITH ANGINA PECTORIS: ICD-10-CM

## 2025-08-08 DIAGNOSIS — J30.89 ENVIRONMENTAL AND SEASONAL ALLERGIES: ICD-10-CM

## 2025-08-08 DIAGNOSIS — E77.8 HYPOPROTEINEMIA: ICD-10-CM

## 2025-08-08 DIAGNOSIS — Z00.00 MEDICARE ANNUAL WELLNESS VISIT, SUBSEQUENT: Primary | ICD-10-CM

## 2025-08-08 DIAGNOSIS — R00.1 BRADYCARDIA, UNSPECIFIED: ICD-10-CM

## 2025-08-08 DIAGNOSIS — R41.3 MEMORY CHANGES: ICD-10-CM

## 2025-08-08 DIAGNOSIS — G43.019 INTRACTABLE MIGRAINE WITHOUT AURA AND WITHOUT STATUS MIGRAINOSUS: ICD-10-CM

## 2025-08-08 DIAGNOSIS — Z71.89 ACP (ADVANCE CARE PLANNING): ICD-10-CM

## 2025-08-08 PROBLEM — E87.1 HYPONATREMIA: Status: RESOLVED | Noted: 2023-08-04 | Resolved: 2025-08-08

## 2025-08-08 PROBLEM — H25.092 AGE-RELATED INCIPIENT CATARACT OF LEFT EYE: Status: RESOLVED | Noted: 2025-03-18 | Resolved: 2025-08-08

## 2025-08-08 PROBLEM — R79.89 ELEVATED CORTISOL LEVEL: Status: RESOLVED | Noted: 2024-08-05 | Resolved: 2025-08-08

## 2025-08-08 PROCEDURE — 1090F PRES/ABSN URINE INCON ASSESS: CPT | Performed by: NURSE PRACTITIONER

## 2025-08-08 PROCEDURE — G8428 CUR MEDS NOT DOCUMENT: HCPCS | Performed by: NURSE PRACTITIONER

## 2025-08-08 PROCEDURE — G0439 PPPS, SUBSEQ VISIT: HCPCS | Performed by: NURSE PRACTITIONER

## 2025-08-08 PROCEDURE — G8399 PT W/DXA RESULTS DOCUMENT: HCPCS | Performed by: NURSE PRACTITIONER

## 2025-08-08 PROCEDURE — 1123F ACP DISCUSS/DSCN MKR DOCD: CPT | Performed by: NURSE PRACTITIONER

## 2025-08-08 PROCEDURE — 3017F COLORECTAL CA SCREEN DOC REV: CPT | Performed by: NURSE PRACTITIONER

## 2025-08-08 PROCEDURE — 1036F TOBACCO NON-USER: CPT | Performed by: NURSE PRACTITIONER

## 2025-08-08 PROCEDURE — 99214 OFFICE O/P EST MOD 30 MIN: CPT | Performed by: NURSE PRACTITIONER

## 2025-08-08 PROCEDURE — 99497 ADVNCD CARE PLAN 30 MIN: CPT | Performed by: NURSE PRACTITIONER

## 2025-08-08 PROCEDURE — G8420 CALC BMI NORM PARAMETERS: HCPCS | Performed by: NURSE PRACTITIONER

## 2025-08-08 PROCEDURE — 1126F AMNT PAIN NOTED NONE PRSNT: CPT | Performed by: NURSE PRACTITIONER

## 2025-08-08 RX ORDER — ALBUTEROL SULFATE 90 UG/1
2 INHALANT RESPIRATORY (INHALATION) EVERY 6 HOURS PRN
Qty: 18 G | Refills: 0
Start: 2025-08-08

## 2025-08-08 RX ORDER — SUMATRIPTAN SUCCINATE 100 MG/1
100 TABLET ORAL DAILY PRN
Qty: 9 TABLET | Refills: 5 | Status: SHIPPED | OUTPATIENT
Start: 2025-08-08

## 2025-08-11 PROBLEM — J30.89 ENVIRONMENTAL AND SEASONAL ALLERGIES: Status: ACTIVE | Noted: 2025-08-11

## 2025-08-11 PROBLEM — E77.8 HYPOPROTEINEMIA: Status: ACTIVE | Noted: 2025-08-11

## 2025-08-11 PROBLEM — M62.838 MUSCLE SPASM: Status: RESOLVED | Noted: 2025-03-24 | Resolved: 2025-08-11

## 2025-08-11 PROBLEM — I25.10 CAD (CORONARY ARTERY DISEASE): Status: RESOLVED | Noted: 2023-08-04 | Resolved: 2025-08-11
